# Patient Record
Sex: FEMALE | Race: WHITE | NOT HISPANIC OR LATINO | Employment: UNEMPLOYED | URBAN - METROPOLITAN AREA
[De-identification: names, ages, dates, MRNs, and addresses within clinical notes are randomized per-mention and may not be internally consistent; named-entity substitution may affect disease eponyms.]

---

## 2022-04-04 NOTE — PROGRESS NOTES
Initial Refugee Screening Exam    PC staff should enter immunizations into the chart     used this visit: A Turkmen  was used for this visit.     HEALTH HISTORY    Concerns today:     L flank pain- has been going on for 3-4 years intermittently; was told that she has a kidney stone on L side that was 4 mm and stones on the R as well. Has been urinating okay. No recent hematuria.   1 year ago last imaged. Last pain happened 2 days ago.  Pains will happen a couple times a week.  She has been using Tylenol and ibuprofen for them.    Eye trouble- has been having itching in her eyes.  She also has blurry vision; she has glasses and [place] that she is not able to bring with her needs to see an eye doctor.  Has not noticed any redness in her eyes and does not have any pain.  No history of allergies.     History of blood infection- happened a long time ago, had a miscarriage because of it. Went to the doctor and was told had infection.  Has not been having any fevers.  She is not sure what kind of blood infection it was, only got diagnosed that after she went for testing after the miscarriage.    Last period 3 months ago, occasionally nauseous. Hasn't tested for pregnancy.  She occasionally feels nauseous.    Country of Origin:  [facility]  Year left country of Origin: September 2021  Other countries lived in and dates: none  Date of Arrival in [place]: September  Is our listed age correct? Yes  Native Language: Turkmen  Family in [place]: Yes - living with brother      Pre-Departure Medical Screening Examination Reviewed:Yes - itchy eyes, otherwise healthy  Class A conditions: No  Class B conditions: No  Presumptive treatment for intestinal parasites?: No  History of BCG vaccination: Unknown  Chronic or serious illness: No  Hospitalizations: No  Trauma: No    .      Family history, medication list, problem list and allergies were reviewed and updated as needed in Epic.    ROS:    No difficulties breathing.  "No nasal congestion, ear pain,       EXAMINATION:  BP 93/59   Pulse 83   Temp 98.3  F (36.8  C) (Oral)   Resp 20   Ht 1.567 m (5' 1.7\")   Wt 48.5 kg (107 lb)   SpO2 99%   BMI 19.76 kg/m    GENERAL: healthy, alert, well nourished, well hydrated, no distress  HENT: Pupils normal.  Nose- normal; Mouth- no ulcers, no lesions.  No conjunctival injection, extraocular movements normal.  NECK: no tenderness, no adenopathy, no asymmetry, no masses, no stiffness; thyroid- normal to palpation  RESP: lungs clear to auscultation - no rales, no rhonchi, no wheezes  CV: regular rates and rhythm, normal S1 S2, no S3 or S4 and no murmur, no click or rub -  ABDOMEN: soft, no tenderness, no  hepatosplenomegaly, no masses, normal bowel sounds    ASSESSMENT:    New Arrival Health Screening     PLAN:    1. Refugee health examination  Will discuss lab results at follow-up visit in 2 weeks  - TB Quantiferon Gold Plus  - Comprehensive Metabolic  - CBC with Platelets & Differential  - Syphilis Screen Cascade  - Strongyloides Ab,IgG (STRNG)  - Schistosoma Yadira  - HIV Ag/Ab Screen Cascade  - Hepatitis A Immune Status  - Hepatitis B Core Ab  - Hepatitis B Surface Ab  - Hepatitis C Antibody  - Hepatitis B Surface Ag  - Neisseria gonorrhoeae PCR  - Chlamydia trachomatis PCR  - Lipid Cascade    2. Nephrolithiasis  Left flank pain  No kidney stones found in Afanian and continued  intermittent flank pain.  Is currently pregnant so would like to avoid CT if possible.  No signs of infection on UA; UC pending.  Not having any dysuria or fevers.  Like to evaluate for stones with ultrasound comfortably same time as when she is having her dating ultrasound  - US Abdomen Complete; Future  - UA reflex to Microscopic  - Urine Microscopic Exam  -Urine culture pending    3. Allergic conjunctivitis, bilateral  No conjunctival changes.  Patient has a reported history of poor vision and would like to get into see eye doctor soon as possible  - " olopatadine (PATANOL) 0.1 % ophthalmic solution; Place 1 drop into both eyes 2 times daily  Dispense: 5 mL; Refill: 1    4. Missed period  regnancy test positive  Incidentally found today; unplanned pregnancy.  About 3 months based off LMP; dating ultrasound ordered.  Have been doing ibuprofen for flank pain; discussed discontinuing this.  - HCG qualitative urine  - US OB <14 WKS SINGLE OR FIRST GESTATION; Future  - US Abdomen Complete; Future  - Prenatal Vit-Fe Fumarate-FA (PRENATAL MULTIVITAMIN W/IRON) 27-0.8 MG tablet; Take 1 tablet by mouth daily  Dispense: 90 tablet; Refill: 3      Diagnosis or treatment significantly limited by social determinants of health - Refugee status, no transportation, very few social supports, language barrier        RTC in 2-4 weeks for discussion of results, treatment (if necessary) and  devlopment of an ongoing plan for care    Felecia Bazzi MD    Patient was seen and discussed with Dr. Butts who agrees with assessment and plan.

## 2022-04-05 ENCOUNTER — OFFICE VISIT (OUTPATIENT)
Dept: FAMILY MEDICINE | Facility: CLINIC | Age: 27
End: 2022-04-05
Payer: MEDICAID

## 2022-04-05 ENCOUNTER — TELEPHONE (OUTPATIENT)
Dept: FAMILY MEDICINE | Facility: CLINIC | Age: 27
End: 2022-04-05

## 2022-04-05 VITALS
OXYGEN SATURATION: 99 % | SYSTOLIC BLOOD PRESSURE: 93 MMHG | DIASTOLIC BLOOD PRESSURE: 59 MMHG | WEIGHT: 107 LBS | HEIGHT: 62 IN | RESPIRATION RATE: 20 BRPM | HEART RATE: 83 BPM | TEMPERATURE: 98.3 F | BODY MASS INDEX: 19.69 KG/M2

## 2022-04-05 DIAGNOSIS — N20.0 NEPHROLITHIASIS: ICD-10-CM

## 2022-04-05 DIAGNOSIS — Z32.01 PREGNANCY TEST POSITIVE: Primary | ICD-10-CM

## 2022-04-05 DIAGNOSIS — R10.9 LEFT FLANK PAIN: ICD-10-CM

## 2022-04-05 DIAGNOSIS — Z02.89 REFUGEE HEALTH EXAMINATION: ICD-10-CM

## 2022-04-05 DIAGNOSIS — N92.6 MISSED PERIOD: ICD-10-CM

## 2022-04-05 DIAGNOSIS — H10.13 ALLERGIC CONJUNCTIVITIS, BILATERAL: ICD-10-CM

## 2022-04-05 LAB
ALBUMIN SERPL-MCNC: 3.4 G/DL (ref 3.5–5)
ALBUMIN UR-MCNC: NEGATIVE MG/DL
ALP SERPL-CCNC: 54 U/L (ref 45–120)
ALT SERPL W P-5'-P-CCNC: <9 U/L (ref 0–45)
ANION GAP SERPL CALCULATED.3IONS-SCNC: 9 MMOL/L (ref 5–18)
APPEARANCE UR: CLEAR
AST SERPL W P-5'-P-CCNC: 11 U/L (ref 0–40)
BACTERIA #/AREA URNS HPF: ABNORMAL /HPF
BASOPHILS # BLD AUTO: 0 10E3/UL (ref 0–0.2)
BASOPHILS NFR BLD AUTO: 1 %
BILIRUB SERPL-MCNC: 0.4 MG/DL (ref 0–1)
BILIRUB UR QL STRIP: NEGATIVE
BUN SERPL-MCNC: 9 MG/DL (ref 8–22)
CALCIUM SERPL-MCNC: 8.8 MG/DL (ref 8.5–10.5)
CHLORIDE BLD-SCNC: 107 MMOL/L (ref 98–107)
CHOLEST SERPL-MCNC: 151 MG/DL
CO2 SERPL-SCNC: 19 MMOL/L (ref 22–31)
COLOR UR AUTO: YELLOW
CREAT SERPL-MCNC: 0.55 MG/DL (ref 0.6–1.1)
EOSINOPHIL # BLD AUTO: 0.1 10E3/UL (ref 0–0.7)
EOSINOPHIL NFR BLD AUTO: 2 %
ERYTHROCYTE [DISTWIDTH] IN BLOOD BY AUTOMATED COUNT: 12.8 % (ref 10–15)
FASTING STATUS PATIENT QL REPORTED: NORMAL
GFR SERPL CREATININE-BSD FRML MDRD: >90 ML/MIN/1.73M2
GLUCOSE BLD-MCNC: 79 MG/DL (ref 70–125)
GLUCOSE UR STRIP-MCNC: NEGATIVE MG/DL
HCG UR QL: POSITIVE
HCT VFR BLD AUTO: 30.1 % (ref 35–47)
HDLC SERPL-MCNC: 57 MG/DL
HGB BLD-MCNC: 9.7 G/DL (ref 11.7–15.7)
HGB UR QL STRIP: NEGATIVE
HIV 1+2 AB+HIV1 P24 AG SERPL QL IA: NEGATIVE
IMM GRANULOCYTES # BLD: 0 10E3/UL
IMM GRANULOCYTES NFR BLD: 0 %
KETONES UR STRIP-MCNC: NEGATIVE MG/DL
LDLC SERPL CALC-MCNC: 82 MG/DL
LEUKOCYTE ESTERASE UR QL STRIP: ABNORMAL
LYMPHOCYTES # BLD AUTO: 1.6 10E3/UL (ref 0.8–5.3)
LYMPHOCYTES NFR BLD AUTO: 26 %
MCH RBC QN AUTO: 29.9 PG (ref 26.5–33)
MCHC RBC AUTO-ENTMCNC: 32.2 G/DL (ref 31.5–36.5)
MCV RBC AUTO: 93 FL (ref 78–100)
MONOCYTES # BLD AUTO: 0.4 10E3/UL (ref 0–1.3)
MONOCYTES NFR BLD AUTO: 7 %
NEUTROPHILS # BLD AUTO: 3.9 10E3/UL (ref 1.6–8.3)
NEUTROPHILS NFR BLD AUTO: 64 %
NITRATE UR QL: NEGATIVE
NRBC # BLD AUTO: 0 10E3/UL
NRBC BLD AUTO-RTO: 0 /100
PH UR STRIP: 6.5 [PH] (ref 5–8)
PLATELET # BLD AUTO: 269 10E3/UL (ref 150–450)
POTASSIUM BLD-SCNC: 4 MMOL/L (ref 3.5–5)
PROT SERPL-MCNC: 6.3 G/DL (ref 6–8)
RBC # BLD AUTO: 3.24 10E6/UL (ref 3.8–5.2)
RBC #/AREA URNS AUTO: ABNORMAL /HPF
SODIUM SERPL-SCNC: 135 MMOL/L (ref 136–145)
SP GR UR STRIP: 1.02 (ref 1–1.03)
SQUAMOUS #/AREA URNS AUTO: ABNORMAL /LPF
TRIGL SERPL-MCNC: 59 MG/DL
UROBILINOGEN UR STRIP-ACNC: 0.2 E.U./DL
WBC # BLD AUTO: 6.1 10E3/UL (ref 4–11)
WBC #/AREA URNS AUTO: ABNORMAL /HPF

## 2022-04-05 PROCEDURE — 87086 URINE CULTURE/COLONY COUNT: CPT | Performed by: FAMILY MEDICINE

## 2022-04-05 PROCEDURE — 86803 HEPATITIS C AB TEST: CPT | Performed by: FAMILY MEDICINE

## 2022-04-05 PROCEDURE — 86706 HEP B SURFACE ANTIBODY: CPT | Performed by: FAMILY MEDICINE

## 2022-04-05 PROCEDURE — 87340 HEPATITIS B SURFACE AG IA: CPT | Performed by: FAMILY MEDICINE

## 2022-04-05 PROCEDURE — 80061 LIPID PANEL: CPT | Performed by: FAMILY MEDICINE

## 2022-04-05 PROCEDURE — 80053 COMPREHEN METABOLIC PANEL: CPT | Performed by: FAMILY MEDICINE

## 2022-04-05 PROCEDURE — 87389 HIV-1 AG W/HIV-1&-2 AB AG IA: CPT | Performed by: FAMILY MEDICINE

## 2022-04-05 PROCEDURE — 86708 HEPATITIS A ANTIBODY: CPT | Performed by: FAMILY MEDICINE

## 2022-04-05 PROCEDURE — 87491 CHLMYD TRACH DNA AMP PROBE: CPT | Performed by: FAMILY MEDICINE

## 2022-04-05 PROCEDURE — 81025 URINE PREGNANCY TEST: CPT | Performed by: FAMILY MEDICINE

## 2022-04-05 PROCEDURE — 86780 TREPONEMA PALLIDUM: CPT | Performed by: FAMILY MEDICINE

## 2022-04-05 PROCEDURE — 36415 COLL VENOUS BLD VENIPUNCTURE: CPT | Performed by: FAMILY MEDICINE

## 2022-04-05 PROCEDURE — 99000 SPECIMEN HANDLING OFFICE-LAB: CPT | Performed by: FAMILY MEDICINE

## 2022-04-05 PROCEDURE — 85025 COMPLETE CBC W/AUTO DIFF WBC: CPT | Performed by: FAMILY MEDICINE

## 2022-04-05 PROCEDURE — 87591 N.GONORRHOEAE DNA AMP PROB: CPT | Performed by: FAMILY MEDICINE

## 2022-04-05 PROCEDURE — 99204 OFFICE O/P NEW MOD 45 MIN: CPT | Mod: GC | Performed by: FAMILY MEDICINE

## 2022-04-05 PROCEDURE — 86704 HEP B CORE ANTIBODY TOTAL: CPT | Performed by: FAMILY MEDICINE

## 2022-04-05 PROCEDURE — 86481 TB AG RESPONSE T-CELL SUSP: CPT | Performed by: FAMILY MEDICINE

## 2022-04-05 PROCEDURE — 81001 URINALYSIS AUTO W/SCOPE: CPT | Performed by: FAMILY MEDICINE

## 2022-04-05 PROCEDURE — 86682 HELMINTH ANTIBODY: CPT | Mod: 90 | Performed by: FAMILY MEDICINE

## 2022-04-05 RX ORDER — OLOPATADINE HYDROCHLORIDE 1 MG/ML
1 SOLUTION/ DROPS OPHTHALMIC 2 TIMES DAILY
Qty: 5 ML | Refills: 1 | Status: SHIPPED | OUTPATIENT
Start: 2022-04-05 | End: 2022-08-05

## 2022-04-05 RX ORDER — PRENATAL VIT/IRON FUM/FOLIC AC 27MG-0.8MG
1 TABLET ORAL DAILY
Qty: 90 TABLET | Refills: 3 | Status: SHIPPED | OUTPATIENT
Start: 2022-04-05 | End: 2022-06-21

## 2022-04-05 NOTE — TELEPHONE ENCOUNTER
Spoke with  Violeta at [facility] ([phone]), she will assist family in getting to pharmacy for medication.    US abd limited  4/12/22 2:40 pm  [place] [id number] [facility] [address]  NPO 8 hours    US OB  4/18/22 1:40 pm  [address] [place] [address]  Prep: Full bladder    4/21/22 1:00 pm  [facility]  [address] [place] [address]    Communicated above information to patient's  with [email or web address] ([place]) . Transportation request sent to [facility] transportation. Update sent to Violeta  at [facility].     ./JERMAIN

## 2022-04-05 NOTE — PROGRESS NOTES
Preceptor Attestation:    I discussed the patient with the resident and evaluated the patient in person. I have verified the content of the note, which accurately reflects my assessment of the patient and the plan of care.   Supervising Physician:  All Butts MD.

## 2022-04-06 ENCOUNTER — TELEPHONE (OUTPATIENT)
Dept: FAMILY MEDICINE | Facility: CLINIC | Age: 27
End: 2022-04-06
Payer: COMMERCIAL

## 2022-04-06 LAB
C TRACH DNA SPEC QL NAA+PROBE: NEGATIVE
HAV IGG SER QL IA: POSITIVE
HBV CORE AB SERPL QL IA: NEGATIVE
HBV SURFACE AB SERPLBLD QL IA.RAPID: POSITIVE
HBV SURFACE AG SERPL QL IA: NONREACTIVE
HCV AB SERPL QL IA: NEGATIVE
N GONORRHOEA DNA SPEC QL NAA+PROBE: NEGATIVE
T PALLIDUM AB SER QL: NONREACTIVE

## 2022-04-06 NOTE — TELEPHONE ENCOUNTER
04/05/2022: Care Coordination      Dr. Dodd asked that I step in to assist Mr. & Mrs. Justice along with their 11 month old Son. They are a refugee family that where in need of household supply's, pots, pans, and dishes Mr. Justice had shoes on that did not fit his feet as he had on shoes that he had folded down the back of the shoes so they would fit like a pair of slippers. His wife and son did not have coats and nor did he.     I searched our inventory was able to locate pots, pans, a crock pot, bowls, glasses, a coat for his wife, and socks for the son. I told them about [employer] [facility] and told them that I would make an appointment for their family to go shopping there.     I then went back to my desk to get ready to leave when one of the CMA's came to me and asked that I call [facility] in [employer] on [address] to check on the medications a well as  set up transportation from here to [employer] then home.     I checked on the medications and each one had 2 prescriptions, all where ready for  without any co pay's except for one of Mr. Justice's  That will need to be picked up today.     I then set up transportation for the family from here to [employer], then home. I debriefed with with Nurse Hopper this morning who updated Nurse Lewis.         Darrion Berkowitz Sr.  Care Coordinator  [address] [address]  [email or web address]   Office: [phone]  Direct: [phone]  [facility]

## 2022-04-07 LAB
BACTERIA UR CULT: NORMAL
GAMMA INTERFERON BACKGROUND BLD IA-ACNC: 0.08 IU/ML
M TB IFN-G BLD-IMP: NEGATIVE
M TB IFN-G CD4+ BCKGRND COR BLD-ACNC: 9.92 IU/ML
MITOGEN IGNF BCKGRD COR BLD-ACNC: -0.01 IU/ML
MITOGEN IGNF BCKGRD COR BLD-ACNC: -0.02 IU/ML
QUANTIFERON MITOGEN: 10 IU/ML
QUANTIFERON NIL TUBE: 0.08 IU/ML
QUANTIFERON TB1 TUBE: 0.07 IU/ML
QUANTIFERON TB2 TUBE: 0.06
STRONGYLOIDES IGG SER IA-ACNC: 0.2 IV

## 2022-04-10 LAB — SCHISTOSOMA IGG SER IA-ACNC: 4 U

## 2022-04-11 NOTE — TELEPHONE ENCOUNTER
Transportation request for below appointments emailed to [facility] 4/6/22.     Addendum 4/11/22:  US abd limited  4/12/22 2:40 pm  [facility]  [address] [address]  NPO 8 hours    Transportation set up with [employer], [phone]. Spoke with patient's  with [email or web address]  and reminded him of appt. ./LR    Addendum 4/15/22:    US OB  4/18/22 1:40 pm  [address] [place] [address]  Prep: Full bladder     4/21/22 1:00 pm  [facility]  [address] [place] [address]    Transportation for above appointments set with [employer], will call return. ./LR

## 2022-04-12 ENCOUNTER — HOSPITAL ENCOUNTER (OUTPATIENT)
Dept: ULTRASOUND IMAGING | Facility: CLINIC | Age: 27
Discharge: HOME | End: 2022-04-12
Admitting: FAMILY MEDICINE
Payer: MEDICAID

## 2022-04-12 DIAGNOSIS — N20.0 NEPHROLITHIASIS: ICD-10-CM

## 2022-04-12 DIAGNOSIS — R10.9 LEFT FLANK PAIN: ICD-10-CM

## 2022-04-12 PROCEDURE — 76770 US EXAM ABDO BACK WALL COMP: CPT

## 2022-04-15 ENCOUNTER — OFFICE VISIT (OUTPATIENT)
Dept: FAMILY MEDICINE | Facility: CLINIC | Age: 27
End: 2022-04-15
Payer: MEDICAID

## 2022-04-15 ENCOUNTER — ALLIED HEALTH/NURSE VISIT (OUTPATIENT)
Dept: FAMILY MEDICINE | Facility: CLINIC | Age: 27
End: 2022-04-15

## 2022-04-15 VITALS
WEIGHT: 108.4 LBS | RESPIRATION RATE: 16 BRPM | BODY MASS INDEX: 21.28 KG/M2 | OXYGEN SATURATION: 98 % | SYSTOLIC BLOOD PRESSURE: 102 MMHG | HEIGHT: 60 IN | TEMPERATURE: 98.4 F | HEART RATE: 83 BPM | DIASTOLIC BLOOD PRESSURE: 67 MMHG

## 2022-04-15 DIAGNOSIS — O09.90 SUPERVISION OF HIGH RISK PREGNANCY, ANTEPARTUM: ICD-10-CM

## 2022-04-15 DIAGNOSIS — Z60.3 LANGUAGE BARRIER, CULTURAL DIFFERENCES: ICD-10-CM

## 2022-04-15 DIAGNOSIS — Z34.81 ENCOUNTER FOR SUPERVISION OF OTHER NORMAL PREGNANCY IN FIRST TRIMESTER: Primary | ICD-10-CM

## 2022-04-15 DIAGNOSIS — N20.0 NEPHROLITHIASIS: ICD-10-CM

## 2022-04-15 DIAGNOSIS — O09.299 H/O POSTPARTUM HEMORRHAGE, CURRENTLY PREGNANT: ICD-10-CM

## 2022-04-15 DIAGNOSIS — Z87.59 HISTORY OF STILLBIRTH: ICD-10-CM

## 2022-04-15 DIAGNOSIS — O26.20 HIGH RISK PREGNANCY DUE TO RECURRENT MISCARRIAGE: ICD-10-CM

## 2022-04-15 DIAGNOSIS — N13.30 HYDROURETERONEPHROSIS: ICD-10-CM

## 2022-04-15 DIAGNOSIS — Z02.89 ENCOUNTER FOR HEALTH EXAMINATION OF REFUGEE: ICD-10-CM

## 2022-04-15 DIAGNOSIS — N20.0 KIDNEY STONE: ICD-10-CM

## 2022-04-15 LAB
ABO/RH(D): NORMAL
ANTIBODY SCREEN: NEGATIVE
SPECIMEN EXPIRATION DATE: NORMAL
SPECIMEN EXPIRATION DATE: NORMAL

## 2022-04-15 PROCEDURE — 86850 RBC ANTIBODY SCREEN: CPT | Performed by: FAMILY MEDICINE

## 2022-04-15 PROCEDURE — 86901 BLOOD TYPING SEROLOGIC RH(D): CPT | Performed by: FAMILY MEDICINE

## 2022-04-15 PROCEDURE — H1001 ANTEPARTUM MANAGEMENT: HCPCS

## 2022-04-15 PROCEDURE — 36415 COLL VENOUS BLD VENIPUNCTURE: CPT | Performed by: FAMILY MEDICINE

## 2022-04-15 PROCEDURE — 87086 URINE CULTURE/COLONY COUNT: CPT | Performed by: FAMILY MEDICINE

## 2022-04-15 PROCEDURE — 99207 PR PRENATAL VISIT: CPT | Mod: GC | Performed by: FAMILY MEDICINE

## 2022-04-15 PROCEDURE — 86762 RUBELLA ANTIBODY: CPT | Performed by: FAMILY MEDICINE

## 2022-04-15 PROCEDURE — 83655 ASSAY OF LEAD: CPT | Mod: 90 | Performed by: FAMILY MEDICINE

## 2022-04-15 PROCEDURE — 99000 SPECIMEN HANDLING OFFICE-LAB: CPT | Performed by: FAMILY MEDICINE

## 2022-04-15 PROCEDURE — H1002 CARECOORDINATION PRENATAL: HCPCS

## 2022-04-15 PROCEDURE — 99207 PR NO BILLABLE SERVICE THIS VISIT: CPT

## 2022-04-15 PROCEDURE — 86900 BLOOD TYPING SEROLOGIC ABO: CPT | Performed by: FAMILY MEDICINE

## 2022-04-15 PROCEDURE — 86780 TREPONEMA PALLIDUM: CPT | Performed by: FAMILY MEDICINE

## 2022-04-15 PROCEDURE — 86787 VARICELLA-ZOSTER ANTIBODY: CPT | Performed by: FAMILY MEDICINE

## 2022-04-15 SDOH — SOCIAL STABILITY - SOCIAL INSECURITY: ACCULTURATION DIFFICULTY: Z60.3

## 2022-04-15 NOTE — PROGRESS NOTES
"  First Obstetric Visit           Assessment and Plan     Chrystal was seen today for prenatal care.    Diagnoses and all orders for this visit:    Encounter for supervision of other normal pregnancy in first trimester  Unsure LMP, thinks around January. Likely about 13 weeks, hasn't had dating US yet.   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 Wondering if was pyelonephritis sepsis based off history; no records available and unfortunately patient was not given/doesn't remember the details.   -     [facility] Referral - Pregnancy; Future    Nephrolithiasis  L flank pain; US showed stones on R side (not in ureter), and likely physiologic hydroureternephrosis. L flank pain is likely musculoskeletal given negative renal ultrasound and lack of hematuria; recommended continue to monitor    26 year old   First pregnancy miscarriage at 6 mo, then 2 months miscarriage   Third pregnancy born at term and  at 1 mo of life  Fourth pregnancy baby is alive and well, 11 months old.     Belkys needed blood after due to postpartum hermorrhage, didn't need procedures  No diabetes or HTN known during pregnancies  6 mo miscarriage was due to \"infection in blood\", ?from pyelonephritis sepsis.      , Unknown weeks of pregnancy with KVNG of Oct 27, 2022 by LMP of Patient's last menstrual period was 2022 (approximate)..      Pregnancy Risk Assessment: High Risk pregnancy  Discussed high risk conditions as follows:  History second trimester intrauterine fetus demise  History " of postpartum hemorrhage needing blood transfusion  History nephrolithiasis with documented right stones present on ultrasound this pregnancy    -Dating US obtained and dating confirmed?   No-patient needs to schedule  -Taking PNV/Folate?     Yes       - Ordered new OB labs: Some of OB labs had already been performed at initial refugee visit 2 weeks ago; remainder were done today.    -Discussed risks and benefits of first trimester screen for trisomies, patient accepted. [facility] referral placed.  - Discussed genetic screening. Patient does want to pursue screening.   - Discussed screening for sickle cell anemia. Patient does not  want to pursue Hb electrophoresis.     - Discussed early screening for gestational diabetes. The patient does not have a history of GDM, BMI>30, h/o prediabetes/glucose intolerance, first degree relative with GDM or DM, or chronic HTN, so WILL NOT obtain early GCT or A1c.    - The patient does not h/o severe, early preeclampsia with delivery <34weeks, preeclampsia in more than one pregnancy, pre-gestational diabetes, chronic hypertension, renal disease, or autoimmune disease so WILL NOT start low dose aspirin (81mg) and calcium supplementation (1g-1.5g/day elemental = 2500mg- 3750mg/day Calcium carbonate) to prevent preeclampsia.    - The patient  does not have a history of spontaneous  birth so  WILL NOT consider progesterone starting at 16-20 weeks and/or serial transvaginal cervical length ultrasounds from 16-24 weeks.    - Prenatal vitamins ordered.      Counseling given:   - Follow up in 4 weeks for return OB visit.  - Recommended weight gain for pregnancy: 25-35 lbs (pregravid BMI 18.5-24.9)      - Instructed on best evidence for: healthy diet and foods to avoid; exercise and activity during pregnancy; avoiding exposure to toxoplasmosis; safe use of seatbelts during pregnancy; and maintenance of a generally healthy lifestyle  -Patient to see OB educator/ RN today and/or next  visit.    Discussed the harms, benefits, side effects and alternative therapies for current prescribed and OTC medications.      There are no Patient Instructions on file for this visit.    Options for treatment and follow-up care were reviewed with the patient and/or guardian. Chrystal Justice and/or guardian engaged in the decision making process and verbalized understanding of the options discussed and agreed with the final plan.    Diagnosis or treatment significantly limited by social determinants of health - Refugee status, language barrier    Felecia Bazzi MD    Patient was seen and discussed with Dr. Valencia who agrees with assessment and plan.            ADRIENNE Justice is a 26 year old woman who presents for an initial prenatal visit at Unknown weeks of pregnancy with KVNG of Oct 27, 2022 by LMP of Patient's last menstrual period was 01/20/2022 (approximate)..      She has not had bleeding since her LMP.   She has had mild nausea.   Weight loss has not occurred.    This was not a planned pregnancy.     OTHER CONCERNS: L flank pain intermittently.  Worried about possibility of getting another blood infection               Past Medical History     Past Medical History:   Diagnosis Date     Kidney stone      See nurse history note, reviewed in detail.             Current Medications      Current Outpatient Medications   Medication Sig Dispense Refill     olopatadine (PATANOL) 0.1 % ophthalmic solution Place 1 drop into both eyes 2 times daily 5 mL 1     Prenatal Vit-Fe Fumarate-FA (PRENATAL MULTIVITAMIN W/IRON) 27-0.8 MG tablet Take 1 tablet by mouth daily 90 tablet 3                  Physical Exam      /67   Pulse 83   Temp 98.4  F (36.9  C)   Resp 16   Ht 1.524 m (5')   Wt 49.2 kg (108 lb 6.4 oz)   LMP 01/20/2022 (Approximate)   SpO2 98%   BMI 21.17 kg/m    GENERAL: healthy, alert and no distress  ABDOMEN: soft, no tenderness, no  hepatosplenomegaly, no masses, normal  bowel sounds  No scars noted. Fundus about 3 cm below umbilicus. FHT 130s      Last pap none on record.  She is due for this today.    =========================================

## 2022-04-15 NOTE — NURSING NOTE
Due to patient being non-English speaking/uses sign language, an  was used for this visit. Only for face-to-face interpretation by an external agency, date and length of interpretation can be found on the scanned worksheet.     name: [id number]  Agency: [employer]  Language: Mojgan   Telephone number: [phone]  Type of interpretation: Telephone, spoken

## 2022-04-15 NOTE — PROGRESS NOTES
Past Medical History     Do you have a history of any of the following medical conditions?    Condition No/Yes/Details   Hypertension No    Heart disease, mitral valve prolapse, rheumatic fever No    Asthma or another chronic lung disease No    An autoimmune disorder No    Kidney disease  YES Pt has kidney stones.   Frequent urinary tract infections No    Epilepsy, seizures, or spells No    Migraine headaches No    Stroke, loss of sensation/function, seizures, or other neuro problem No    Diabetes No    Thyroid problems or have you taken thyroid medication  YES as a child was given medicine   Hepatitis, liver disease, jaundice No    Blood clots, phlebitis, pulmonary embolism or varicose veins No    Excessive bleeding after surgery or dental work No    Do you have more bleeding than other women after a cut or a scratch? No    Anemia No    Blood transfusions  YES following delivery of her son        Would you refuse a blood transfusion?       No   If yes, then ask next question. If no, skip next question.   Would you rather die than receive a blood transfusion? No    Breast problems No    Have you ever ?  YES plans to breastfeed   Abnormalities of the uterus No    Abnormal pap smear No    Have you ever been treated for depression? No    Are you having problems with crying spells or loss of self-esteem? No    Have you ever required psychiatric care? No    Have you been physically, sexually, or emotionally hurt by someone? No    Have you been in a major accident or suffered serious trauma? No    Have you ever had any gynecological surgical procedures such as cervical conization, LEEP, laser treatment, cryosurgery of the cervix, or a dilatation and curettage? No    Have you had any other surgical procedures? No         Have you ever had any complications from anesthesia? No    Have you ever been hospitalized for a nonsurgical reason? No             Substance use and exposure     Does anyone in your home  smoke? No    Do you use tobacco products or betel nut? No    Do you drink beer, wine, or hard liquor? No    Do you use any of the following: marijuana, speed, cocaine, heroin, hallucinogens, or other drugs? No               Symptoms since Last Menstrual Period     Do you currently have any of the following symptoms: No/Yes/Details        Abdominal pain  YES flank pain has rt side, mild abd pain        Blood in the stools or urine No         Chest pain No         Shortness of breath No         coughing or vomiting up blood No         Your heart racing or skipping beats No         Nausea or vomiting No -resolved        Pain on urination  YES resolved        Vaginal discharge or bleeding No                Genetic Screening          Is the patient 35 years or older? No      Do you have a history of any of the following No/Yes/Details        A metabolic disorder (e.g. Insulin-dependent DM, PKU) No         Recurrent pregnancy loss or still birth No      Do you, the baby's father, or anyone in your families have          Thalassemia AND MCV <80 No         Hemophilia No         Neural tube defect No }        Congenital heart defect No         Sickle cell disease or trait No         Muscular dystrophy No         Cystic fibrosis No         Mental retardation or autism No         Down's syndrome No         Rajeev-Sach's disease No         Terrence's chorea No         Any other inherited genetic or chromosomal disorder No         A child with birth defects not listed above No                Infection History     Ever treated for tuberculosis or had a positive skin test No    Ever had genital herpes (or has your partner) No    Had a rash or viral illness since LMP No    Ever had a sexually transmitted infection No    Ever had chicken pox or the vaccine No    Have you had a sexual partner who is HIV positive No    Ever had any other serious infectious disease No                Risk Assessment     Average Risk Category  No  significant risk factors: Yes    At Risk Category (up to 3)  Teen pregnancy: No  Poor social situation: No  Domestic abuse: No  Financial difficulties: Yes  Smoker: No  H/O  deliver: Yes  H/O drug abuse: No  Non-English speaking: Yes  Advanced maternal age: No  GDM risks: No  Previous C/S: No  H/O PIH: No  H/O STIs: No  H/O mental health concerns: No  Onset care > 20 weeks: No      High Risk Category (4 or more At Risk or)  Diabetes/GDM: No  Multiple gestation: No  Chronic hypertension: No  Significant hx of asthma: No  Fetal demise > 20 weeks: No  Positive tox screen: No  Current mental health treatment: No      Risk: High Risk   Date determined: 4/15/22

## 2022-04-15 NOTE — PROGRESS NOTES
Preceptor Attestation:    I discussed the patient with the resident and evaluated the patient in person. I have verified the content of the note, which accurately reflects my assessment of the patient and the plan of care.   Supervising Physician:  Daily Valencia MD.

## 2022-04-16 LAB
RUBV IGG SERPL QL IA: 30.6 INDEX
RUBV IGG SERPL QL IA: POSITIVE
T PALLIDUM AB SER QL: NONREACTIVE
VZV IGG SER QL IA: 714 INDEX
VZV IGG SER QL IA: POSITIVE

## 2022-04-17 LAB
BACTERIA UR CULT: NORMAL
LEAD BLDV-MCNC: <2 UG/DL

## 2022-04-18 PROBLEM — O09.299 H/O POSTPARTUM HEMORRHAGE, CURRENTLY PREGNANT: Status: ACTIVE | Noted: 2022-04-15

## 2022-04-18 PROBLEM — N13.30 HYDROURETERONEPHROSIS: Status: ACTIVE | Noted: 2022-04-12

## 2022-04-18 PROBLEM — Z60.3 LANGUAGE BARRIER, CULTURAL DIFFERENCES: Status: ACTIVE | Noted: 2022-04-15

## 2022-04-18 PROBLEM — Z87.59 HISTORY OF STILLBIRTH: Status: ACTIVE | Noted: 2022-04-15

## 2022-04-18 PROBLEM — N20.0 KIDNEY STONE: Status: ACTIVE | Noted: 2022-04-12

## 2022-04-18 PROBLEM — Z02.89 ENCOUNTER FOR HEALTH EXAMINATION OF REFUGEE: Status: ACTIVE | Noted: 2022-04-15

## 2022-04-18 PROBLEM — O09.90 SUPERVISION OF HIGH RISK PREGNANCY, ANTEPARTUM: Status: ACTIVE | Noted: 2022-04-15

## 2022-04-18 NOTE — PROGRESS NOTES
Family Medicine OB Education    I provided the following OB education to Chrystal Justice.    Discussed that Dr Dodd or other [facility] provider should be the doctor that she sees for her prenatal visits and that provider will try hard to be the one to deliver her baby.  Discussed that if primary provider was unavailable that one of our other physicians would deliver the baby and that this could be a male or female provider.  Briefly discussed residency program and that multiple doctors would be present at time of delivery.  Sheet given and discussed fetal growth and development.  Sheet given and discussed warning signs with reasons to call clinic or L&D with questions or concerns (phone numbers given).  Sheet given and discussed Tdap to be given after 27 weeks gestation and the importance of family members get immunized before delivery.  Proof of pregnancy completed and given to pt.  Gave pt preregistration form for hospital to complete.  See questionnaire and pregnancy risk assessment for further information in provider encounter.     Legal Screener completed and there were no concerns for government benefits, housing, or immigration.      Name of provider who requested the OB education: Dr Yousif Bazzi  Name of provider on site (faculty or community preceptor) at the time of performing the OB education: Dr Erik Gallo, RN, BSN

## 2022-04-19 NOTE — RESULT ENCOUNTER NOTE
Please call patient with  to convey the following results:    There was no signs of infection in the urine or in the blood. Your blood type is AB positive.

## 2022-04-21 ENCOUNTER — TELEPHONE (OUTPATIENT)
Dept: FAMILY MEDICINE | Facility: CLINIC | Age: 27
End: 2022-04-21
Payer: COMMERCIAL

## 2022-04-21 NOTE — TELEPHONE ENCOUNTER
----- Message from Felecia Bazzi MD sent at 4/19/2022 10:52 AM CDT -----  Regarding: FW: dating US needed  MFMARTHA wants the dating before seeing her- looks like she didn't go to the [place] yesterday. Can you help her with rescheduling?   Thanks!    ----- Message -----  From: Diann Headley RN  Sent: 4/19/2022   8:39 AM CDT  To: Felecia Bazzi MD  Subject: dating US needed                                 Thank you for the referral on Ms. Justice. Could you please get her scheduled for a dating US asap so that we can verify viability and schedule her appropriately.  We will call to schedule her as soon as that is complete.    Thank you,  Diann Headley RN  Patient Care Coordinator  Maternal Fetal Medicine

## 2022-04-21 NOTE — TELEPHONE ENCOUNTER
4/20/22 Called and scheduled her dating u/s for Monday, 4/25/22 at 4:40 PM at [facility] (soonest available) and ride will  between 3:40-4:10 PM to bring to the appt and return ride set up as a will call.      Son's MRI is 5/13/22 at 1:00 PM at [facility].     Sent referral to [facility] and they should call her soon to set up home visit.     [facility] APPT: Friday, 5/20/22 at 3:30 PM with Dr Behm and ride will  between 2:30-3:00 PM and return ride set up as a will call.           4/21/22 8:20 AM Tried calling pt with [employer] (I) to let her know x's 2 and there was no answer and [facility] is full./KERLINE    4/21/22 10:55 AM Tried calling again and there was no answer./NG

## 2022-04-22 ENCOUNTER — TELEPHONE (OUTPATIENT)
Dept: FAMILY MEDICINE | Facility: CLINIC | Age: 27
End: 2022-04-22
Payer: COMMERCIAL

## 2022-04-22 NOTE — TELEPHONE ENCOUNTER
Attempted to reschedule patient's eye appointment to Friday after 2 pm per her request as that is when  is home from work. The soonest [facility] has an appointment this time is July 15th. Spoke with patient with [employer]  who stated in that case any day after 3 is ok. If these are not available, patient reluctantly agreed to do a morning appointment every day.     Scheduled for:  Tuesday May 3, 2022 at 8:30 am  [address] [place] [id number]    Messaged  Violeta to see if she could assist with transportation so  could get to work after appointment. ./LR    Addendum 4/25/22 2:45 pm  Bollinger back from Violeta  - she will get clients an uber to and from the appointment. ./LR

## 2022-04-25 NOTE — TELEPHONE ENCOUNTER
4/22/22 Called pt with [employer] [place]) and gave her info.  Pt asked for assistance with rescheduling eye doctor appt as she missed it and also her  needs help with dentist appt.      Discussed with Freda MCMULLEN and she will schedule eye doctor appt and dental appt for pt and her .  She will give them the appt info when they come to their appt on Tuesday, 4/26/22./KERLINE

## 2022-04-28 ENCOUNTER — TRANSCRIBE ORDERS (OUTPATIENT)
Dept: MATERNAL FETAL MEDICINE | Facility: CLINIC | Age: 27
End: 2022-04-28
Payer: COMMERCIAL

## 2022-04-28 DIAGNOSIS — O26.90 PREGNANCY RELATED CONDITION, ANTEPARTUM: Primary | ICD-10-CM

## 2022-04-29 ENCOUNTER — HOSPITAL ENCOUNTER (OUTPATIENT)
Facility: CLINIC | Age: 27
End: 2022-04-29
Payer: COMMERCIAL

## 2022-04-29 NOTE — PROGRESS NOTES
Maternal-Fetal Medicine Consultation    Chrystal Justice  : 1995  MRN: [id number]    REFERRAL:  Chrystal Justice is a 26 year old sent by Dr. Yousif Bazzi from [facility] for MFM consultation.    We spent 25 minutes with Chrystal and her partner today during our consultation, with > 50% of this time spent in face to face counseling and consultation for a history of IUFD. We utilized a Estonian iPad .     HPI:  Chrystal Justice is a 26 year old  at 19w3d by US today not c/w LMP here for MFM consultation regarding her history of intrauterine fetal demise.    Chrystal is a recent immigrant from [place].  She immig[place] the [place] in [place]. She is here with her  and her son. Her primary language is Estonian.     She has had a complicated obstetrical history. However, there is limited information available regarding these pregnancies and the patient was unable to provide much additional information at this time:  - 1st pregnancy in : 24w0d fetal demise.  Per report this was secondary to a blood infection in the setting of a kidney stone. She was told the stillbirth was related to a blood infection that she had.   - 2nd pregnancy in 2019: 8w0d SAB  - 3rd pregnancy in : 40w0d term delivery.   developed a fever on day 2 of life and demised at one month of age of unknown causes.  - 4th pregnancy in : 40w0d term delivery.  Complicated by postpartum hemorrhage requiring a blood transfusion. Her son was hospitalized for one month due to an infection as well as head swelling. He is doing well now without perceived medical issues.  Of note, he has an extra digit on his right hand abutting his thumb.    She is here today for a formal ultrasound as well as consultation regarding her current pregnancy.        Obstetrics History:  OB History    Para Term  AB Living   5 3 2 1 1 1   SAB IAB Ectopic Multiple Live Births   1 0 0 0 2  "     # Outcome Date GA Lbr Lg/2nd Weight Sex Delivery Anes PTL Lv   5 Current            4 Term 21 40w0d   M Vag-Spont None N HILDA      Birth Comments: postpartum hemorrhage, had to have blood transfusion, baby admitted in hospital for 1 month due to infection, head swelling      Name: Belkys Pepe Term  40w0d   M Vag-Spont  N DEC      Birth Comments: baby developed fever at 2 days of age and passed away at 1 month of age, unsure cause   2 SAB  8w0d    SAB      1   24w0d    STILLBIRTH None N FD      Birth Comments: passed away in utero, delivered vaginally in hospital and was told that she did have blood infection along with kidney stone       Past Medical History:  Past Medical History:   Diagnosis Date     Kidney stone        Past Surgical History:  Past Surgical History:   Procedure Laterality Date     NO HISTORY OF SURGERY         Current Medications:  Prior to Admission medications    Medication Sig Last Dose Taking? Auth Provider   olopatadine (PATANOL) 0.1 % ophthalmic solution Place 1 drop into both eyes 2 times daily   Felecia Hobson MD   Prenatal Vit-Fe Fumarate-FA (PRENATAL MULTIVITAMIN W/IRON) 27-0.8 MG tablet Take 1 tablet by mouth daily   Felecia Hobson MD       Allergies:  Patient has no known allergies.    Social History:   Denies use of alcohol, drugs or smoking.    Family History:  Non-contributory    ROS:  10-point ROS negative except as in HPI     PHYSICAL EXAM:  Deferred     Imaging:   Please see \"Imaging\" tab under \"Chart Review\" for details of today's US at the [facility].   Please note: anatomic survey is suboptimal due to fetal position  EGA today is changed based on all  US parameters consistent with KVNG of 22.     ASSESSMENT/PLAN:  Chrystal Justice is a 26 year old  at 19w3d by US today not c/w LMP here for [facility] consultation regarding her history of intrauterine fetal demise in the second trimester.     History " of fetal demise  - The most common causes of fetal death in developed countries are congenital or karyotypic anomalies, placental problems associated with growth restriction, and maternal medical diseases such as infection, diabetes mellitus, or hypertensive disorders. Many cases of fetal death, especially at term, are attributed to umbilical cord accidents. In approximately 25% of cases, however, the cause of fetal death cannot be explained.  The majority of women experiencing a fetal demise do not have any risk factors.   In Ms. Justice's case, her fetal demise appears to be secondary to infection/sepsis.  The risk of recurrence can therefore be mitigated by careful monitoring of the pregnancy for nephrolithiasis, urinary tract infection, and/or other infection.  In addition, access to care in this developed country will also improve pregnancy outcome.  - Antepartum fetal monitoring in the third trimester may help to identify fetuses at risk for death, however fetal deaths do occur in pregnancies receiving regular  testing.  Delivery timing should balance the risk between prematurity and the increasing risk of fetal death due to advancing gestational age.      Recommendations:  - Comprehensive anatomy ultrasound completed today.  - Serial ultrasounds every 4-6 weeks to assess growth may be used to assess placental function.  If growth is lagging or interval growth is suboptimal ultrasounds should be done more frequently  - Daily fetal movement counts after 28 weeks  gestation   - Frequent visits, documentation of fetal heart tones and fetal well-being and lots of positive reinforcement are invaluable.   - Urine culture every trimester with aggressive treatment of bacteriuria and/or blood in the urine.  If there is suspicion for infection or nephrolithiasis the patient should be actively managed and hospitalization not delayed if there is concern for sepsis.  If patient has recurrent UTIs, please do not  hesitate to put this patient on pharmaceutical prophylaxis.  - Consider induction at 39 weeks gestation.    The patient was seen and evaluated with Dr. Maradiaga    Thank you for allowing us to participate in the care of your patient. Please do not hesitate to contact us if you have further questions regarding the management of your patient.     Brooklynn Jj MD  Maternal Fetal Medicine Fellow  5/3/2022 2:48 PM      Physician Attestation   I, Chikis Maradiaga MD, saw this patient and agree with the findings and plan of care as documented in the note.      Items personally reviewed/procedural attestation: vitals, labs, imaging and agree with the interpretation documented in the note and I was present for and supervised the entire consultation and ultrasound procedure.    Chikis Maradiaga MD

## 2022-05-02 ENCOUNTER — PRE VISIT (OUTPATIENT)
Dept: MATERNAL FETAL MEDICINE | Facility: CLINIC | Age: 27
End: 2022-05-02
Payer: COMMERCIAL

## 2022-05-02 NOTE — TELEPHONE ENCOUNTER
Patient is scheduled for MFM tomorrow, conflicting with eye appointment. Will prioritize MFM appointment and reschedule eye appointment. Patient is aware.  is also aware and will assist with transportation. ./LR

## 2022-05-03 ENCOUNTER — HOSPITAL ENCOUNTER (OUTPATIENT)
Dept: ULTRASOUND IMAGING | Facility: CLINIC | Age: 27
Discharge: HOME | End: 2022-05-03
Attending: OBSTETRICS & GYNECOLOGY
Payer: COMMERCIAL

## 2022-05-03 ENCOUNTER — OFFICE VISIT (OUTPATIENT)
Dept: MATERNAL FETAL MEDICINE | Facility: CLINIC | Age: 27
End: 2022-05-03
Attending: OBSTETRICS & GYNECOLOGY
Payer: COMMERCIAL

## 2022-05-03 DIAGNOSIS — O26.90 PREGNANCY RELATED CONDITION, ANTEPARTUM: ICD-10-CM

## 2022-05-03 DIAGNOSIS — O09.292 SUPERVISION OF PREGNANCY WITH OTHER POOR REPRODUCTIVE OR OBSTETRIC HISTORY, SECOND TRIMESTER: Primary | ICD-10-CM

## 2022-05-03 PROCEDURE — 96040 HC GENETIC COUNSELING, EACH 30 MINUTES: CPT | Performed by: GENETIC COUNSELOR, MS

## 2022-05-03 PROCEDURE — 76813 OB US NUCHAL MEAS 1 GEST: CPT

## 2022-05-03 PROCEDURE — 99241 PR OFFICE CONSULTATION,LEVEL I: CPT | Mod: 25 | Performed by: OBSTETRICS & GYNECOLOGY

## 2022-05-03 PROCEDURE — 76811 OB US DETAILED SNGL FETUS: CPT

## 2022-05-03 NOTE — TELEPHONE ENCOUNTER
Eye appointment rescheduled:    5/17 8:30 am  [facility]  [address] [place] [address]    Transportation set up with [employer], will call return. Patient aware. ./LR

## 2022-05-03 NOTE — PROGRESS NOTES
Please see full imaging report from [employer] program under imaging tab.    Chikis Maradiaga MD  Maternal Fetal Medicine

## 2022-05-03 NOTE — PROGRESS NOTES
Pt presents to [facility] for assessment and evaluation of her pregnancy due to Hx IUFD @ 24w, kx kidney stones/hydroureteronephrosis, poor dating. Pt using ipad  today. Here with her  and child. Pt had us done and reviewed by Dr. Maradiaga. Pt met with Dr. Maradiaga and Dr. Jj. See note for today's discussion and recommendations. Pt will return in 4 weeks for Rl2. Questions answered. Discharged stable at this time. Daily Guevara RN

## 2022-05-03 NOTE — PROGRESS NOTES
"[facility]  Genetic Counseling Consult    Patient:  Chrystal Justice YOB: 1995   Date of Service:  22      Chrystal Justice was seen at the [facility] for genetic consultation as part of her appointment for comprehensive ultrasound.  The indication for genetic counseling is history of miscarriage and infant death. She was accompanied by her partner and their child.     The encounter was conducted with an ipad [employer]  due to the patient speaking limited english.        Impression/Plan:   1. Chrystal has not had serum screening in this pregnancy.     2. Chrystal had a comprehensive (level II) ultrasound today.  Please see the ultrasound report for further details.    3. The patient declines genetic amniocentesis and maternal serum screening today.     4. Chrystal has a follow-up ultrasound scheduled for 2022.     Pregnancy History:   /Parity:     Age at Delivery: 27 year old  KVNG: 10/27/2022, by Last Menstrual Period  Gestational Age: 19w3d    Chrystal mendoza pregnancy history is significant for the following in [facility]:  o 2016: Late miscarriage or stillbirth around 5-6 months, patient recalls having some kidney problems during pregnancy. She is not sure if she was born with kidney problems but does recall them previously to this pregnancy. During that pregnancy she may have had some \"blood problems\" and loss the pregnancy  o 2018: First trimester miscarriage  o : Term pregnancy, healthy son at birth. Patient recalls being very ill and weak during this pregnancy and having \"blood problems\" and being told to \"not eat milk or yogurt\". Her son  around one month of age. Patient is unsure of the cause of his death but does recall a fever  o : Term pregnancy, healthy son, doing well. During this pregnancy she recalls taking a medication for blood and not eating yogurt or milk and she felt " much better.      Medical History:   Chrystal mendoza reported medical history is not expected to impact pregnancy management or risks to fetal development.       Family History:   A three-generation pedigree was not obtained due to time constraints and focus on pregnancy history. Chrystal was asked if any of her relatives had problems with their kidneys and she was unsure.     Carrier Screening:   Expanded carrier screening for mutations in a large panel of genes associated with autosomal recessive conditions including cystic fibrosis, spinal muscular atrophy, and others, is now available.    Carrier screening was not discussed today. If she I and /or her partner are interested in further discussing the option of carrier screening, MFM would be available to assist in coordination if desired.        Risk Assessment for Chromosome Conditions:   We explained that the risk for fetal chromosome abnormalities increases with maternal age. We discussed specific features of common chromosome abnormalities, including Down syndrome, trisomy 13, trisomy 18, and sex chromosome trisomies.      At age 27 at midtrimester, the risk to have a baby with Down syndrome is 1 in 855.    At age 27 at midtrimester, the risk to have a baby with any chromosome abnormality is 1 in 428.       Chrystal did not have maternal serum screening earlier in pregnancy.    Testing Options:   Genetic testing was briefly discussed but not in detail. Testing was declined.     It was a pleasure to be involved with Chrystal mendoza care. Face-to-face time of the meeting was 30 minutes.      Kiley Duncan MS, [facility]  Licensed Genetic Counselor   [facility]  Maternal Fetal Medicine  [email or web address]  [email or web address]  Office: [phone]  Pager [phone]  [facility]: [phone]   Fax: [phone]

## 2022-05-04 NOTE — PATIENT INSTRUCTIONS
Maternal Fetal Medicine  [address] [address]    Phone: [phone]    APPT: Tuesday, 5/31/22 at 9:30 AM for ultrasound and consult.    RIDE: [employer] will pick you up between 8:30-9:00 AM to bring you to the appointment and return ride set up as a will call.

## 2022-05-31 ENCOUNTER — HOSPITAL ENCOUNTER (OUTPATIENT)
Dept: ULTRASOUND IMAGING | Facility: CLINIC | Age: 27
Discharge: HOME | End: 2022-05-31
Attending: OBSTETRICS & GYNECOLOGY
Payer: COMMERCIAL

## 2022-05-31 ENCOUNTER — OFFICE VISIT (OUTPATIENT)
Dept: MATERNAL FETAL MEDICINE | Facility: CLINIC | Age: 27
End: 2022-05-31
Attending: OBSTETRICS & GYNECOLOGY
Payer: COMMERCIAL

## 2022-05-31 DIAGNOSIS — O26.90 PREGNANCY RELATED CONDITION, ANTEPARTUM: ICD-10-CM

## 2022-05-31 DIAGNOSIS — Z36.89 ENCOUNTER FOR ULTRASOUND TO ASSESS FETAL GROWTH: ICD-10-CM

## 2022-05-31 DIAGNOSIS — O09.292 HISTORY OF STILLBIRTH IN CURRENTLY PREGNANT PATIENT, SECOND TRIMESTER: Primary | ICD-10-CM

## 2022-05-31 DIAGNOSIS — Z87.59 HISTORY OF STILLBIRTH: ICD-10-CM

## 2022-05-31 PROCEDURE — 76816 OB US FOLLOW-UP PER FETUS: CPT

## 2022-05-31 PROCEDURE — 76816 OB US FOLLOW-UP PER FETUS: CPT | Mod: 26 | Performed by: OBSTETRICS & GYNECOLOGY

## 2022-05-31 NOTE — PROGRESS NOTES
Please refer to ultrasound report under 'Imaging' Studies of 'Chart Review' tabs.    Young Valentine M.D.

## 2022-06-03 ENCOUNTER — OFFICE VISIT (OUTPATIENT)
Dept: FAMILY MEDICINE | Facility: CLINIC | Age: 27
End: 2022-06-03
Payer: COMMERCIAL

## 2022-06-03 VITALS
OXYGEN SATURATION: 98 % | DIASTOLIC BLOOD PRESSURE: 67 MMHG | RESPIRATION RATE: 18 BRPM | BODY MASS INDEX: 23.63 KG/M2 | WEIGHT: 121 LBS | TEMPERATURE: 98.6 F | SYSTOLIC BLOOD PRESSURE: 102 MMHG | HEART RATE: 94 BPM

## 2022-06-03 DIAGNOSIS — O09.90 SUPERVISION OF HIGH RISK PREGNANCY, ANTEPARTUM: Primary | ICD-10-CM

## 2022-06-03 DIAGNOSIS — L29.9 ITCHING: ICD-10-CM

## 2022-06-03 DIAGNOSIS — K21.9 GASTROESOPHAGEAL REFLUX DISEASE WITHOUT ESOPHAGITIS: ICD-10-CM

## 2022-06-03 PROCEDURE — 87086 URINE CULTURE/COLONY COUNT: CPT | Performed by: FAMILY MEDICINE

## 2022-06-03 PROCEDURE — 99207 PR PRENATAL VISIT: CPT | Mod: GC | Performed by: FAMILY MEDICINE

## 2022-06-03 RX ORDER — BENZOCAINE/MENTHOL 6 MG-10 MG
LOZENGE MUCOUS MEMBRANE 2 TIMES DAILY
Qty: 14.2 G | Refills: 1 | Status: SHIPPED | OUTPATIENT
Start: 2022-06-03 | End: 2022-06-21

## 2022-06-03 RX ORDER — FAMOTIDINE 40 MG/1
40 TABLET, FILM COATED ORAL 2 TIMES DAILY
Qty: 60 TABLET | Refills: 3 | Status: SHIPPED | OUTPATIENT
Start: 2022-06-03 | End: 2022-06-21

## 2022-06-03 RX ORDER — ACETAMINOPHEN 325 MG/1
325-650 TABLET ORAL EVERY 6 HOURS PRN
Qty: 180 TABLET | Refills: 1 | Status: SHIPPED | OUTPATIENT
Start: 2022-06-03 | End: 2022-06-21

## 2022-06-03 NOTE — NURSING NOTE
Due to patient being non-English speaking/uses sign language, an  was used for this visit. Only for face-to-face interpretation by an external agency, date and length of interpretation can be found on the scanned worksheet.     name: Rosenda  Agency: [employer]  Language: Ade    Telephone number: [phone]  Type of interpretation: Group, spoken; number of participants: 2           None

## 2022-06-03 NOTE — PROGRESS NOTES
Assessment & Plan  26 year old  at 23w6d with KVNG Sep 2022 based on LMP and   19 week US    Chrystal was seen today for prenatal care, abdominal pain, dizziness and medication reconciliation.    Diagnoses and all orders for this visit:    Supervision of high risk pregnancy, antepartum  Lower abdominal pain  Suspect round ligament pain, given pregnancy lumbrosacral band.   -     Urine Culture; Future  -     acetaminophen (TYLENOL) 325 MG tablet; Take 1-2 tablets (325-650 mg) by mouth every 6 hours as needed for mild pain  -     Miscellaneous Order for DME - ONLY FOR DME  -     Urine Culture    Gastroesophageal reflux disease without esophagitis  Epigastric pain, no blood in stools  -     famotidine (PEPCID) 40 MG tablet; Take 1 tablet (40 mg) by mouth 2 times daily    Lightheadedness  Orthostatics actually fairly benign. Is having frequent nausea. Recommend patient increase water intake and we will continue to follow closely. Would recommend laboratory workup if continuing.   -     Orthostatic blood pressure and pulse    Itching  Has been ongoing for a few months-- did not have time to fully discuss today, asked patient to come back for future visit  -     hydrocortisone (CORTAID) 1 % external cream; Apply topically 2 times daily      Weight gain adequate: 6.35 kg (14 lb) to date, out of recommended total of 25-35 lbs (pregravid BMI 18.5-24.9)    There are no Patient Instructions on file for this visit.    Return to clinic in 1-2 weeks.    Felecia Bazzi MD  I precepted today with Pete Espinoza MD.      Subjective  Concerns:     Feels weak and light headed, gets dizziness, gets blurred vision. Has headaches.     Walking is very difficult because having stomach pains and pain in the pelvic area. Can't stand up for a long time.     Nauseous most of the time and light headedness.     Constantly thirsty and drinking a lot of water.     ROS:  YES - Headache- tends to be related to her stomach,  worse when she's nauseated.   YES - Changes in vision- some blurriness  No - Chest Pain  No - Shortness of Breath  YES - Nausea   No - Vomiting  YES - Abdominal pain - both upper and lower in the pelvis, especially when walking.   No - Contractions  No - Dysuria   No - Vaginal Discharge    No - Vaginal bleeding   No - Loss of Fluid   No - Extremity swelling   Present - Fetal movement       Patient Active Problem List   Diagnosis     Supervision of high risk pregnancy, antepartum     Language barrier, cultural differences     Encounter for health examination of refugee     History of stillbirth     H/O postpartum hemorrhage, currently pregnant     Kidney stone     Hydroureteronephrosis       Chrystal Justice speaks [place]; Greenlandic so an  was used today.    Guidance:  OTC medications  weight gain and exercise    Going to [place]? Yes      Objective  /67 (BP Location: Left arm, Patient Position: Standing, Cuff Size: Adult Regular)   Pulse 94   Temp 98.6  F (37  C) (Oral)   Resp 18   Wt 54.9 kg (121 lb)   LMP 01/20/2022 (Approximate)   SpO2 98%   BMI 23.63 kg/m    No distress.  Gravid abdomen.  FHT 140s.  Fundal height 23 cm.  no edema.    Results  Blood type: AB POS  Results for orders placed or performed in visit on 06/03/22   Urine Culture     Status: None    Specimen: Urine, Midstream   Result Value Ref Range    Culture 10,000-50,000 CFU/mL Urogenital magno

## 2022-06-05 LAB — BACTERIA UR CULT: NORMAL

## 2022-06-08 NOTE — PROGRESS NOTES
Preceptor Attestation:    I discussed the patient with the resident and evaluated the patient in person. I have verified the content of the note, which accurately reflects my assessment of the patient and the plan of care.   Supervising Physician:  Pete Espinoza MD.

## 2022-06-15 ENCOUNTER — TELEPHONE (OUTPATIENT)
Dept: FAMILY MEDICINE | Facility: CLINIC | Age: 27
End: 2022-06-15
Payer: COMMERCIAL

## 2022-06-15 NOTE — TELEPHONE ENCOUNTER
Pt here for her son's appt.  Assisted her with rescheduling her ANGELINE from 6/17 to 6/21 [facility]

## 2022-06-21 ENCOUNTER — OFFICE VISIT (OUTPATIENT)
Dept: FAMILY MEDICINE | Facility: CLINIC | Age: 27
End: 2022-06-21
Payer: COMMERCIAL

## 2022-06-21 VITALS
OXYGEN SATURATION: 98 % | BODY MASS INDEX: 24.14 KG/M2 | SYSTOLIC BLOOD PRESSURE: 93 MMHG | DIASTOLIC BLOOD PRESSURE: 60 MMHG | HEART RATE: 86 BPM | RESPIRATION RATE: 18 BRPM | TEMPERATURE: 97.5 F | WEIGHT: 123.6 LBS

## 2022-06-21 DIAGNOSIS — L50.9 URTICARIA: Primary | ICD-10-CM

## 2022-06-21 DIAGNOSIS — Z32.01 PREGNANCY TEST POSITIVE: ICD-10-CM

## 2022-06-21 DIAGNOSIS — O09.90 SUPERVISION OF HIGH RISK PREGNANCY, ANTEPARTUM: ICD-10-CM

## 2022-06-21 DIAGNOSIS — L29.9 ITCHING: ICD-10-CM

## 2022-06-21 DIAGNOSIS — K21.9 GASTROESOPHAGEAL REFLUX DISEASE WITHOUT ESOPHAGITIS: ICD-10-CM

## 2022-06-21 PROCEDURE — 99207 PR PRENATAL VISIT: CPT | Mod: GC | Performed by: FAMILY MEDICINE

## 2022-06-21 RX ORDER — BENZOCAINE/MENTHOL 6 MG-10 MG
LOZENGE MUCOUS MEMBRANE 2 TIMES DAILY
Qty: 14.2 G | Refills: 1 | Status: SHIPPED | OUTPATIENT
Start: 2022-06-21 | End: 2023-11-15

## 2022-06-21 RX ORDER — PRENATAL VIT/IRON FUM/FOLIC AC 27MG-0.8MG
1 TABLET ORAL DAILY
Qty: 90 TABLET | Refills: 3 | Status: SHIPPED | OUTPATIENT
Start: 2022-06-21 | End: 2023-11-15

## 2022-06-21 RX ORDER — LORATADINE 10 MG/1
10 TABLET ORAL DAILY
Qty: 30 TABLET | Refills: 1 | Status: SHIPPED | OUTPATIENT
Start: 2022-06-21 | End: 2022-06-21

## 2022-06-21 RX ORDER — LORATADINE 10 MG/1
10 TABLET ORAL DAILY
Qty: 30 TABLET | Refills: 1 | Status: SHIPPED | OUTPATIENT
Start: 2022-06-21 | End: 2022-07-25

## 2022-06-21 RX ORDER — FAMOTIDINE 40 MG/1
40 TABLET, FILM COATED ORAL 2 TIMES DAILY
Qty: 60 TABLET | Refills: 3 | Status: SHIPPED | OUTPATIENT
Start: 2022-06-21

## 2022-06-21 RX ORDER — ACETAMINOPHEN 325 MG/1
325-650 TABLET ORAL EVERY 6 HOURS PRN
Qty: 180 TABLET | Refills: 1 | Status: SHIPPED | OUTPATIENT
Start: 2022-06-21 | End: 2023-11-13

## 2022-06-21 RX ORDER — FAMOTIDINE 40 MG/1
40 TABLET, FILM COATED ORAL 2 TIMES DAILY
Qty: 60 TABLET | Refills: 3 | Status: SHIPPED | OUTPATIENT
Start: 2022-06-21 | End: 2022-06-21

## 2022-06-21 NOTE — PROGRESS NOTES
Assessment & Plan  26 year old  at 26w3d with KVNG Sep 2022 based on LMP    Chrystal was seen today for prenatal care.    Diagnoses and all orders for this visit:    Of note- has difficulty with transportation/getting to the pharmacy. All meds were reordered to [facility] with note to pharmacy asking to set up home delivery, with pt phone number and instructions to call with  added    Supervision of high risk pregnancy, antepartum  Hx stillbirth, having growth US with MFM, next scheduled for . Due for follow up in 2 weeks with GDM and Tdap at that time  -     acetaminophen (TYLENOL) 325 MG tablet; Take 1-2 tablets (325-650 mg) by mouth every 6 hours as needed for mild pain  -     Prenatal Vit-Fe Fumarate-FA (PRENATAL MULTIVITAMIN W/IRON) 27-0.8 MG tablet; Take 1 tablet by mouth daily    Urticaria  Difficult to assess as not having symptoms/rash currently, but when looking at pictures of urticaria she said it looked like that- suspect she is having intermittent urticaria. Will try to start claritin to attempt suppressing it.   -     hydrocortisone (CORTAID) 1 % external cream; Apply topically 2 times daily  -     loratadine (CLARITIN) 10 MG tablet; Take 1 tablet (10 mg) by mouth daily    Gastroesophageal reflux disease without esophagitis  Wasn't able to  pepcid  -     acetaminophen (TYLENOL) 325 MG tablet; Take 1-2 tablets (325-650 mg) by mouth every 6 hours as needed for mild pain  -     famotidine (PEPCID) 40 MG tablet; Take 1 tablet (40 mg) by mouth 2 times daily      Weight gain adequate: 7.53 kg (16 lb 9.6 oz) to date, out of recommended total of 25-35 lbs (pregravid BMI 18.5-24.9)    There are no Patient Instructions on file for this visit.    Return to clinic in 2 weeks.    Felecia Bazzi MD  I precepted today with Alvarado Wei MD.    Diagnosis or treatment significantly limited by social determinants of health - refugee, no transportation, low  health literacy, language barrier  Prescription drug management      Subjective  Concerns:     Cortisone ointment didn't help.     Sometimes it's all over her body, lately it has been present in her hands only. Red rash. It happens after she washes her hands- itches and gets red. Will resolve on its own.   When she gets sweaty or when uses soap it also happens. Never happened before these last few months.    Still having stomach pain in the epigastric area. Wasn't able to  the famotidine.     ROS:  No - Headache  No - Changes in vision  No - Chest Pain  No - Shortness of Breath  No - Nausea   No - Vomiting  YES - Abdominal pain - epigastric pain continues  No - Contractions  No - Dysuria   No - Vaginal Discharge    No - Vaginal bleeding   No - Loss of Fluid   No - Extremity swelling   Present - Fetal movement         Risk Assessment   Average Risk Category  No significant risk factors: No      Risk: High Risk   Date determined: 2022    Patient Active Problem List   Diagnosis     Supervision of high risk pregnancy, antepartum     Language barrier, cultural differences     Encounter for health examination of refugee     History of stillbirth     H/O postpartum hemorrhage, currently pregnant     Kidney stone     Hydroureteronephrosis       Chrystal Justice speaks [place]; English so an  was used today.    Guidance:  GDM  signs of  labor    Objective  BP 93/60   Pulse 86   Temp 97.5  F (36.4  C)   Resp 18   Wt 56.1 kg (123 lb 9.6 oz)   LMP 2022 (Approximate)   SpO2 98%   BMI 24.14 kg/m    No distress.  Gravid abdomen.  FHT 130s.  Fundal height 26 cm.  no edema.    Results  Blood type: AB POS  No results found for any visits on 22.

## 2022-06-21 NOTE — NURSING NOTE
Due to patient being non-English speaking/uses sign language, an  was used for this visit. Only for face-to-face interpretation by an external agency, date and length of interpretation can be found on the scanned worksheet.     name: [id number]  Agency: [employer]  Language: Ade   Telephone number: [phone]  Type of interpretation: Telephone, spoken

## 2022-06-29 NOTE — PROGRESS NOTES
Preceptor Attestation:    I discussed the patient with the resident and evaluated the patient in person. I have verified the content of the note, which accurately reflects my assessment of the patient and the plan of care.   Supervising Physician:  Alvarado Wei MD.

## 2022-07-01 ENCOUNTER — OFFICE VISIT (OUTPATIENT)
Dept: FAMILY MEDICINE | Facility: CLINIC | Age: 27
End: 2022-07-01
Payer: COMMERCIAL

## 2022-07-01 VITALS
SYSTOLIC BLOOD PRESSURE: 99 MMHG | OXYGEN SATURATION: 98 % | TEMPERATURE: 98 F | HEART RATE: 96 BPM | RESPIRATION RATE: 20 BRPM | BODY MASS INDEX: 24.62 KG/M2 | HEIGHT: 60 IN | WEIGHT: 125.4 LBS | DIASTOLIC BLOOD PRESSURE: 63 MMHG

## 2022-07-01 DIAGNOSIS — Z34.92 PRENATAL CARE IN SECOND TRIMESTER: Primary | ICD-10-CM

## 2022-07-01 LAB
ERYTHROCYTE [DISTWIDTH] IN BLOOD BY AUTOMATED COUNT: 12.7 % (ref 10–15)
HCT VFR BLD AUTO: 32.9 % (ref 35–47)
HGB BLD-MCNC: 11.2 G/DL (ref 11.7–15.7)
MCH RBC QN AUTO: 31.5 PG (ref 26.5–33)
MCHC RBC AUTO-ENTMCNC: 34 G/DL (ref 31.5–36.5)
MCV RBC AUTO: 92 FL (ref 78–100)
PLATELET # BLD AUTO: 224 10E3/UL (ref 150–450)
RBC # BLD AUTO: 3.56 10E6/UL (ref 3.8–5.2)
WBC # BLD AUTO: 9.7 10E3/UL (ref 4–11)

## 2022-07-01 PROCEDURE — 85027 COMPLETE CBC AUTOMATED: CPT

## 2022-07-01 PROCEDURE — 36415 COLL VENOUS BLD VENIPUNCTURE: CPT

## 2022-07-01 PROCEDURE — 99207 PR PRENATAL VISIT: CPT | Mod: GC

## 2022-07-01 NOTE — NURSING NOTE
Due to patient being non-English speaking/uses sign language, an  was used for this visit. Only for face-to-face interpretation by an external agency, date and length of interpretation can be found on the scanned worksheet.            name: Crut atkins  Agency: [employer]ckenna Nichols  Language: Syriac   Telephone [phone]  Type of interpretation: Face-to-face, spoken

## 2022-07-08 ENCOUNTER — TELEPHONE (OUTPATIENT)
Dept: CARE COORDINATION | Facility: CLINIC | Age: 27
End: 2022-07-08

## 2022-07-08 NOTE — TELEPHONE ENCOUNTER
7/8/2022: Care Coordination     CC: Was called by the provider requesting that I set up transportation for an upcoming in person appointment at:[address]. [place] [address].   Phone: [phone].    Appointment Date:  7/20/2022    Appointment Time:  11:45am     time:  Between: 11:00am - 11:30am    Return Trip:  Will Call Service  [employer] [phone]      Darrion Berkowitz Sr.  Care Coordinator  [address] [address]  [email or web address]   Office: [phone]  Direct: [phone]  [facility]

## 2022-07-08 NOTE — LETTER
2022      Chrystal Justice  [address]  [place] [address]        Dear Chrystal,  I am one of the Care Coordinators at [facility]. I am writing you today to remind you of an upcoming appointment on: 2022 at 11:45am. Going to: [address] [address].[employer] will pick you up between 11:00am - 11:30 am. You will need to activate the will call service for your return trip by [phone]       Sincerely,      Darrion Berkowitz Sr.  Care Coordinator  [address] [address]  [email or web address]   Office: [phone]  Direct: [phone]  [facility]

## 2022-07-25 ENCOUNTER — OFFICE VISIT (OUTPATIENT)
Dept: FAMILY MEDICINE | Facility: CLINIC | Age: 27
End: 2022-07-25
Payer: COMMERCIAL

## 2022-07-25 VITALS
TEMPERATURE: 97.6 F | BODY MASS INDEX: 24.99 KG/M2 | HEART RATE: 85 BPM | SYSTOLIC BLOOD PRESSURE: 99 MMHG | WEIGHT: 128.4 LBS | RESPIRATION RATE: 20 BRPM | DIASTOLIC BLOOD PRESSURE: 65 MMHG | OXYGEN SATURATION: 98 %

## 2022-07-25 DIAGNOSIS — L50.9 URTICARIA: ICD-10-CM

## 2022-07-25 DIAGNOSIS — Z87.59 HISTORY OF STILLBIRTH: ICD-10-CM

## 2022-07-25 DIAGNOSIS — Z34.80 PRENATAL CARE, SUBSEQUENT PREGNANCY, UNSPECIFIED TRIMESTER: ICD-10-CM

## 2022-07-25 DIAGNOSIS — Z3A.31 31 WEEKS GESTATION OF PREGNANCY: Primary | ICD-10-CM

## 2022-07-25 LAB
ALBUMIN UR-MCNC: NEGATIVE MG/DL
APPEARANCE UR: CLEAR
BILIRUB UR QL STRIP: NEGATIVE
COLOR UR AUTO: YELLOW
GLUCOSE UR STRIP-MCNC: NEGATIVE MG/DL
HGB UR QL STRIP: NEGATIVE
KETONES UR STRIP-MCNC: NEGATIVE MG/DL
LEUKOCYTE ESTERASE UR QL STRIP: ABNORMAL
NITRATE UR QL: NEGATIVE
PH UR STRIP: 7.5 [PH] (ref 5–8)
SP GR UR STRIP: 1.02 (ref 1–1.03)
UROBILINOGEN UR STRIP-ACNC: 0.2 E.U./DL

## 2022-07-25 PROCEDURE — 87086 URINE CULTURE/COLONY COUNT: CPT

## 2022-07-25 PROCEDURE — 99207 PR PRENATAL VISIT: CPT | Mod: GC

## 2022-07-25 PROCEDURE — 81003 URINALYSIS AUTO W/O SCOPE: CPT

## 2022-07-25 RX ORDER — LORATADINE 10 MG/1
10 TABLET ORAL DAILY
Qty: 30 TABLET | Refills: 1 | Status: SHIPPED | OUTPATIENT
Start: 2022-07-25 | End: 2023-11-15

## 2022-07-25 NOTE — PROGRESS NOTES
Assessment & Plan  26 year old  at 31w2d with KVNG Sep 2022 based on LMP  23 week US    Chrystal was seen today for prenatal care.    Diagnoses and all orders for this visit:    31 weeks gestation of pregnancy  -     Urine Culture  -     UA reflex to Microscopic        Weight gain adequate: 9.707 kg (21 lb 6.4 oz) to date, out of recommended total of 25-35 lbs (pregravid BMI 18.5-24.9)    Patient Instructions   Go to Urology appointment for son on  at 1pm.     Follow up with me in 2 weeks.       Return to clinic in 2 weeks.    Yasmin Dhaliwal MD  I precepted today with Pete Espinoza MD.    Diagnosis or treatment significantly limited by social determinants of health - non-English speaking, immigrant, no transportation, limited income  Ordering of each unique test  60 minutes spent on the date of the encounter doing chart review, history and exam, documentation and further activities per the note        Subjective  Concerns: none    ROS:  No - Headache  Yes - Changes in vision, itchy eyes   No - Chest Pain  No - Shortness of Breath  No - Nausea   No - Vomiting  No - Abdominal pain   No - Contractions  No - Dysuria   No - Vaginal Discharge    No - Vaginal bleeding   No - Loss of Fluid   No - Extremity swelling   Present - Fetal movement       Going to WIC? Yes      Patient Active Problem List   Diagnosis     Supervision of high risk pregnancy, antepartum     Language barrier, cultural differences     Encounter for health examination of refugee     History of stillbirth     H/O postpartum hemorrhage, currently pregnant     Kidney stone     Hydroureteronephrosis       Chrystal Justice speaks [place]; Hebrew so an  was used today.    Guidance:  fetal growth and movement  signs of  labor    Do you need help getting a car seat? No  Do you need help getting a breast pump? No      Objective  BP 99/65   Pulse 85   Temp 97.6  F (36.4  C)   Resp 20   Wt 58.2 kg (128 lb 6.4 oz)   LMP  01/20/2022 (Approximate)   SpO2 98%   BMI 24.99 kg/m    No distress.  Gravid abdomen.  .  Fundal height 31.5 cm.  no edema.    Results  Blood type: AB POS  No results found for any visits on 07/25/22.

## 2022-07-25 NOTE — NURSING NOTE
Due to patient being non-English speaking/uses sign language, an  was used for this visit. Only for face-to-face interpretation by an external agency, date and length of interpretation can be found on the scanned worksheet.     name:[id number]  Agency: [employer]  Language: Mojgan   Telephone number: [phone]  Type of interpretation: Telephone, spoken

## 2022-07-27 LAB — BACTERIA UR CULT: NORMAL

## 2022-07-29 ENCOUNTER — OFFICE VISIT (OUTPATIENT)
Dept: MATERNAL FETAL MEDICINE | Facility: HOSPITAL | Age: 27
End: 2022-07-29
Attending: OBSTETRICS & GYNECOLOGY
Payer: COMMERCIAL

## 2022-07-29 ENCOUNTER — ANCILLARY PROCEDURE (OUTPATIENT)
Dept: ULTRASOUND IMAGING | Facility: HOSPITAL | Age: 27
End: 2022-07-29
Attending: OBSTETRICS & GYNECOLOGY
Payer: COMMERCIAL

## 2022-07-29 DIAGNOSIS — Z87.59 HISTORY OF STILLBIRTH: Primary | ICD-10-CM

## 2022-07-29 DIAGNOSIS — O09.293 HISTORY OF STILLBIRTH IN CURRENTLY PREGNANT PATIENT, THIRD TRIMESTER: ICD-10-CM

## 2022-07-29 DIAGNOSIS — Z36.89 ENCOUNTER FOR ULTRASOUND TO ASSESS FETAL GROWTH: ICD-10-CM

## 2022-07-29 PROCEDURE — 99207 PR NO CHARGE LOS: CPT | Performed by: OBSTETRICS & GYNECOLOGY

## 2022-07-29 PROCEDURE — 76816 OB US FOLLOW-UP PER FETUS: CPT | Mod: 26 | Performed by: OBSTETRICS & GYNECOLOGY

## 2022-07-29 PROCEDURE — 76816 OB US FOLLOW-UP PER FETUS: CPT

## 2022-07-29 NOTE — PROGRESS NOTES
Please see the imaging tab for details of the ultrasound performed today.    Opal Breen MD  Specialist in Maternal-Fetal Medicine

## 2022-08-05 ENCOUNTER — OFFICE VISIT (OUTPATIENT)
Dept: FAMILY MEDICINE | Facility: CLINIC | Age: 27
End: 2022-08-05
Payer: COMMERCIAL

## 2022-08-05 VITALS
RESPIRATION RATE: 16 BRPM | OXYGEN SATURATION: 98 % | HEART RATE: 89 BPM | BODY MASS INDEX: 25.38 KG/M2 | DIASTOLIC BLOOD PRESSURE: 58 MMHG | SYSTOLIC BLOOD PRESSURE: 98 MMHG | TEMPERATURE: 98.4 F | WEIGHT: 130.4 LBS

## 2022-08-05 DIAGNOSIS — Z87.59 HISTORY OF STILLBIRTH: ICD-10-CM

## 2022-08-05 DIAGNOSIS — Z34.92 PRENATAL CARE IN SECOND TRIMESTER: ICD-10-CM

## 2022-08-05 DIAGNOSIS — Z60.3 LANGUAGE BARRIER, CULTURAL DIFFERENCES: ICD-10-CM

## 2022-08-05 DIAGNOSIS — O09.90 SUPERVISION OF HIGH RISK PREGNANCY, ANTEPARTUM: Primary | ICD-10-CM

## 2022-08-05 DIAGNOSIS — O09.299 H/O POSTPARTUM HEMORRHAGE, CURRENTLY PREGNANT: ICD-10-CM

## 2022-08-05 LAB
GLUCOSE 1H P 50 G GLC PO SERPL-MCNC: 106 MG/DL (ref 70–129)
T PALLIDUM AB SER QL: NONREACTIVE

## 2022-08-05 PROCEDURE — 82950 GLUCOSE TEST: CPT

## 2022-08-05 PROCEDURE — 86780 TREPONEMA PALLIDUM: CPT

## 2022-08-05 PROCEDURE — 36415 COLL VENOUS BLD VENIPUNCTURE: CPT

## 2022-08-05 PROCEDURE — 99207 PR PRENATAL VISIT: CPT | Mod: GC

## 2022-08-05 SDOH — SOCIAL STABILITY - SOCIAL INSECURITY: ACCULTURATION DIFFICULTY: Z60.3

## 2022-08-05 NOTE — PROGRESS NOTES
Preceptor Attestation:    I discussed the patient with the resident and evaluated the patient in person. I have verified the content of the note, which accurately reflects my assessment of the patient and the plan of care.   Supervising Physician:  Pete Esipnoza MD.

## 2022-08-05 NOTE — PROGRESS NOTES
Assessment & Plan  26 year old  at 32w6d with KVNG Sep 2022 based on LMP and  23 week US    Chrystal was seen today for prenatal care.    Diagnoses and all orders for this visit:    Supervision of high risk pregnancy, antepartum  Patient has PMH of stillbirth secondary to urosepsis, one 1st trimester SAB, and one live birth were baby passed away from fever at 2 days old.    -     Glucose tolerance gest screen 1 hour  -     Syphilis Screen [facility] (RPR/VDRL)        Weight gain adequate: 10.6 kg (23 lb 6.4 oz) to date, out of recommended total of 25-35 lbs (pregravid BMI 18.5-24.9)    There are no Patient Instructions on file for this visit.    Return to clinic in 2 weeks.    Yasmin Dhaliwal MD  I precepted today with Pete Espinoza MD.    Diagnosis or treatment significantly limited by social determinants of health - recent refugees, non-English speaking  35 minutes spent on the date of the encounter doing chart review, history and exam, documentation and further activities per the note        Subjective  Concerns: none, still working on urology stuff for oldest son, missed appointment yesterday and need to reschedule     Occasional knee pain and some dizziness (mostly with standing up)    ROS:  No - Headache  YES - Changes in vision, itchy eyes  No - Chest Pain  No - Shortness of Breath  No - Nausea   No - Vomiting  No - Abdominal pain   No - Contractions  No - Dysuria   No - Vaginal Discharge    No - Vaginal bleeding   No - Loss of Fluid   No - Extremity swelling   Present - Fetal movement       Going to WIC? Yes      Patient Active Problem List   Diagnosis     Supervision of high risk pregnancy, antepartum     Language barrier, cultural differences     Encounter for health examination of refugee     History of stillbirth     H/O postpartum hemorrhage, currently pregnant     Kidney stone     Hydroureteronephrosis       Chrystal Justice speaks [place]; Wolof so an  was used today.    Guidance:   Advised drinking water and standing slowly to avoid lightheadedness    Do you need help getting a car seat? No  Do you need help getting a breast pump? No      Objective  BP 98/58 (BP Location: Left arm, Patient Position: Sitting, Cuff Size: Adult Regular)   Pulse 89   Temp 98.4  F (36.9  C) (Oral)   Resp 16   Wt 59.1 kg (130 lb 6.4 oz)   LMP 01/20/2022 (Approximate)   SpO2 98%   BMI 25.38 kg/m    No distress.  Gravid abdomen.  .  Fundal height 34 cm.  no edema.    Results  Blood type: AB POS  No results found for any visits on 08/05/22.

## 2022-08-19 ENCOUNTER — TELEPHONE (OUTPATIENT)
Dept: CARE COORDINATION | Facility: OTHER | Age: 27
End: 2022-08-19

## 2022-08-19 NOTE — TELEPHONE ENCOUNTER
2022: Care Coordination    CC: received a call from patients  stating that he and his wife's transportation never showed up for today's appointment with Dr. Dhaliwal at 11:00am.    A call was placed to the front office to inquire if there was still enough time to get Ms. Justice in today? However, I was told that we would need to reschedule.     Another call was placed to [employer] to inquire why this family was not picked up?     CC:Was told that they waited for 6 minutes and left without a call to the patient.    CC: called the family back to see what days and time work for them? Was told that anytime in the afternoon will be fine. Mr. Justice also updated his phone number during this call.    CC: called Brionna in the front office who was able to schedule the patient with Dr. Deleon on 2022 at 12:50pm.    CC: Called stephon and was able to get them a ride scheduled with [employer] with a  time between: 11:50am - 12:20pm. They will need to use the will call service for their return trip by [phone]    CC: call the patient and informed them of the appointment and transportation.      Darrion Berkowitz Sr.  Care Coordinator  [address] [address]  [email or web address]   Office: [phone]  Direct: [phone]  [facility]

## 2022-08-26 ENCOUNTER — ANCILLARY PROCEDURE (OUTPATIENT)
Dept: ULTRASOUND IMAGING | Facility: HOSPITAL | Age: 27
End: 2022-08-26
Attending: OBSTETRICS & GYNECOLOGY
Payer: COMMERCIAL

## 2022-08-26 ENCOUNTER — OFFICE VISIT (OUTPATIENT)
Dept: MATERNAL FETAL MEDICINE | Facility: HOSPITAL | Age: 27
End: 2022-08-26
Attending: OBSTETRICS & GYNECOLOGY
Payer: COMMERCIAL

## 2022-08-26 DIAGNOSIS — O09.293 HISTORY OF STILLBIRTH IN CURRENTLY PREGNANT PATIENT, THIRD TRIMESTER: Primary | ICD-10-CM

## 2022-08-26 DIAGNOSIS — O09.293 HISTORY OF STILLBIRTH IN CURRENTLY PREGNANT PATIENT, THIRD TRIMESTER: ICD-10-CM

## 2022-08-26 PROCEDURE — 76816 OB US FOLLOW-UP PER FETUS: CPT | Mod: 26 | Performed by: OBSTETRICS & GYNECOLOGY

## 2022-08-26 PROCEDURE — 99207 PR NO CHARGE LOS: CPT | Performed by: OBSTETRICS & GYNECOLOGY

## 2022-08-26 PROCEDURE — 76816 OB US FOLLOW-UP PER FETUS: CPT

## 2022-08-26 NOTE — PROGRESS NOTES
"Please see \"Imaging\" tab under \"Chart Review\" for details of today's visit.    Vargas Banegas    "

## 2022-09-16 ENCOUNTER — OFFICE VISIT (OUTPATIENT)
Dept: FAMILY MEDICINE | Facility: CLINIC | Age: 27
End: 2022-09-16
Payer: COMMERCIAL

## 2022-09-16 VITALS
OXYGEN SATURATION: 97 % | HEART RATE: 98 BPM | SYSTOLIC BLOOD PRESSURE: 106 MMHG | DIASTOLIC BLOOD PRESSURE: 69 MMHG | HEIGHT: 61 IN | WEIGHT: 138 LBS | RESPIRATION RATE: 16 BRPM | BODY MASS INDEX: 26.06 KG/M2 | TEMPERATURE: 97.9 F

## 2022-09-16 DIAGNOSIS — O09.90 SUPERVISION OF HIGH RISK PREGNANCY, ANTEPARTUM: ICD-10-CM

## 2022-09-16 DIAGNOSIS — Z12.4 CERVICAL CANCER SCREENING: Primary | ICD-10-CM

## 2022-09-16 PROCEDURE — 99207 PR PRENATAL VISIT: CPT | Mod: GC

## 2022-09-16 PROCEDURE — 87653 STREP B DNA AMP PROBE: CPT

## 2022-09-16 RX ORDER — ACETAMINOPHEN 500 MG
500-1000 TABLET ORAL EVERY 6 HOURS PRN
Qty: 100 TABLET | Refills: 3 | Status: SHIPPED | OUTPATIENT
Start: 2022-09-16

## 2022-09-16 NOTE — PROGRESS NOTES
Assessment & Plan  Chrystal Justice is a 26 year old , at 38w6d weeks of pregnancy with KVNG of Sep 24, 2022 by LMP consistent with 23 week ultrasound.  Patient's history is high risk for the following reasons: PMH of urosepsis leading to stillbirth, one first trimester SAB, and one live birth were baby passed away from fever at 2 days old, and current pregnancy tracking 98th %tile on last ultrasound.     # High risk concerns (medical problems, high risk surgical/obstetric history, high risk social situation, etc): PMH of urosepsis leading to stillbirth, one first trimester SAB, and one live birth were baby passed away from fever at 2 days old, and current pregnancy tracking 98th %tile on last ultrasound.  - In addition to routine prenatal care, patient will need induction at 39 weeks.  - Problem list reviewed and updated.    Weight gain adequate: 14.1 kg (31 lb) to date, out of recommended total of 25-35 lbs (pregravid BMI 18.5-24.9)      Return to clinic in 3-4 days for discussion of IOL.     Yasmin Dhaliwal MD  I precepted today with Denys Salamanca MD.    Diagnosis or treatment significantly limited by social determinants of health - non-English speaking, refugees, limited transportation  Ordering of each unique test  Prescription drug management  40 minutes spent on the date of the encounter doing chart review, history and exam, documentation and further activities per the note        Subjective  Concerns: flank and belly pain so bad as to unable to walk, worse each day, daily, worst at night, belly feels like burning (flank), hasn't tried anything yet, no known aggravating factors    When patient points to pain, points to upper swell of belly where it meets her ribs as well as lowest point of belly where it meets her thighs.     ROS:  No - Headache  No - Changes in vision  No - Chest Pain  No - Shortness of Breath  No - Nausea   No - Vomiting  No - Abdominal pain   Yes - Contractions, intermittent, 2-3 times  "a day  No - Dysuria   No - Vaginal Discharge    No - Vaginal bleeding   No - Loss of Fluid   Yes - Extremity swelling, feet  Present - Fetal movement       Going to WIC? Yes      Patient Active Problem List   Diagnosis     Supervision of high risk pregnancy, antepartum     Language barrier, cultural differences     Encounter for health examination of refugee     History of stillbirth     H/O postpartum hemorrhage, currently pregnant     Kidney stone     Hydroureteronephrosis       Chrystal Justice speaks [place]; Korean so an  was used today.    Guidance:    Discussed the importance of early induction of labor. Chrystal was a little reluctant. We reached a compromise where she will come in early next week and we will talk about induction if she has not delivered naturally by then.    Patient and spouse reported concerns for transportation to [facility] as they do not have a car. They stated that they may need to go to [facility] if they are unable to provide transport at time of labor.     GBS  signs of labor  pain control during labor    -discussed side effects and safety, family knew someone who had difficulty walking after getting one after anesthesia wore off    Do you need help getting a car seat? No  Do you need help getting a breast pump? No      Objective  /69 (BP Location: Left arm, Patient Position: Sitting, Cuff Size: Adult Regular)   Pulse 98   Temp 97.9  F (36.6  C) (Oral)   Resp 16   Ht 1.537 m (5' 0.5\")   Wt 62.6 kg (138 lb)   LMP 01/20/2022 (Approximate)   SpO2 97%   BMI 26.51 kg/m    No distress.  Gravid abdomen.  FHT 141bpm.  Fundal height 138 cm.  trace nonpitting edema.  Cervix: very posterior, closed, long and thick    Results  Blood type: AB POS  No results found for any visits on 09/16/22.    "

## 2022-09-16 NOTE — PROGRESS NOTES
Preceptor Attestation:    I discussed the patient with the resident and evaluated the patient in person. I have verified the content of the note, which accurately reflects my assessment of the patient and the plan of care.   Supervising Physician:  Denys Salamanca MD.

## 2022-09-16 NOTE — Clinical Note
Hey, I've been following this patient for some time but I haven't been receiving her US results until recently due to Megan Dodd still listed as her OBGYN provider. Fixed that and found out she is tracking 98th%tile. Going to have her follow up in clinic early next week and try to set up induction. Family has some transportation limits due to no car, so I'm thinking if we can get a ride set up to [employer] for them, that will make things a lot easier.  Let me know what I can do to help salvage this oopsie on my part.  Yasmin Dhaliwal MD

## 2022-09-17 LAB — GP B STREP DNA SPEC QL NAA+PROBE: NEGATIVE

## 2022-09-21 ENCOUNTER — OFFICE VISIT (OUTPATIENT)
Dept: FAMILY MEDICINE | Facility: CLINIC | Age: 27
End: 2022-09-21
Payer: COMMERCIAL

## 2022-09-21 VITALS
HEART RATE: 93 BPM | BODY MASS INDEX: 26.43 KG/M2 | DIASTOLIC BLOOD PRESSURE: 75 MMHG | TEMPERATURE: 98.3 F | SYSTOLIC BLOOD PRESSURE: 111 MMHG | RESPIRATION RATE: 16 BRPM | OXYGEN SATURATION: 98 % | WEIGHT: 137.6 LBS

## 2022-09-21 DIAGNOSIS — Z32.01 PREGNANCY TEST POSITIVE: ICD-10-CM

## 2022-09-21 PROCEDURE — 59426 ANTEPARTUM CARE ONLY: CPT | Mod: GC

## 2022-09-21 NOTE — PATIENT INSTRUCTIONS
Induction of Labor at [facility]  [facility]  [address] [place] [address]    Phone: [phone]    APPT: Friday, 9/23/22 at 7:00 AM [facility]    RIDE: [employer] [phone] will  at 6:30 AM to bring you to the appointment.  The hospital can help with the return ride home.

## 2022-09-21 NOTE — PROGRESS NOTES
Assessment & Plan  Chrystal Justice is a 26 year old , at 39w4d weeks of pregnancy with KVNG of Sep 24, 2022 by LMP consistent with 23 week ultrasound.  Patient's history is high risk for the following reasons: PMH of urosepsis leading to stillbirth, one first trimester SAB, and one live birth were baby passed away from fever at 2 days old, and current pregnancy tracking 98th %tile on last ultrasound.     # High risk concerns (medical problems, high risk surgical/obstetric history, high risk social situation, etc): PMH of urosepsis leading to stillbirth, one first trimester SAB, and one live birth were baby passed away from fever at 2 days old, and current pregnancy tracking 98th %tile on last ultrasound. Patient and family also have a toddler at home to care for and no transportation of their own. Family are recent refugees from [place].   - In addition to routine prenatal care, patient will need IOL this Friday at 10am.  - Problem list reviewed and updated.    Weight gain adequate: 13.9 kg (30 lb 9.6 oz) to date, out of recommended total of 25-35 lbs (pregravid BMI 18.5-24.9)    Patient Instructions   Induction of Labor at [facility]  [facility]  [facility] [place] [address]    Phone: [phone]    APPT: Friday, 22 at 7:00 AM at [facility]    RIDE: [employer] [phone] will  at 6:30 AM to bring you to the appointment.  The hospital can help with the return ride home.  NB: we changed the appointment and the car ride to reflect this. New pickup time is 9:30am and new appointment time is 10am.     Yasmin Dhaliwal MD PGY-2   I precepted today with Evie Valencia MD.    Diagnosis or treatment significantly limited by social determinants of health - limited transportation, non-English speaking, refugee, limited finances  Ordering of each unique test  90 minutes spent on the date of the encounter doing chart review, history and  exam, documentation and further activities per the note        Subjective  Concerns: Discussed importance of IOL due to projected size of baby. Family has a lot of social concerns including no personal transportation, far distance from home to [facility] where we would be scheduling the induction, tolerate home that needs caring for, new refugee status and unfamiliarity with [place] birthing systems, and previous induction of labor resulting in blood transfusion due to hemorrhage.    Discussed concerns with family explained importance of induction at this time.  Family was amenable to it this Friday.  Scheduled visit accordingly.    ROS:  No - Headache  No - Changes in vision  No - Chest Pain  No - Shortness of Breath  No - Nausea   No - Vomiting  YES - Abdominal pain, flank pain from last visit still continuing see note 9/16/2022.  Family was unable to  the Tylenol from the pharmacy due to transportation issues.  No - Contractions  No - Dysuria   No - Vaginal Discharge    No - Vaginal bleeding   No - Loss of Fluid   Yes - Extremity swelling, feet   Present - Fetal movement       Going to WIC? Yes      Patient Active Problem List   Diagnosis     Supervision of high risk pregnancy, antepartum     Language barrier, cultural differences     Encounter for health examination of refugee     History of stillbirth     H/O postpartum hemorrhage, currently pregnant     Kidney stone     Hydroureteronephrosis       Chrystal Justice speaks [place]; Maori so an  was used today.    Guidance:   discussed induction of labor and the importance of going.    Do  you need help getting a car seat? No  Do you need help getting a breast pump? No      Objective  /75 (BP Location: Left arm, Patient Position: Sitting, Cuff Size: Adult Regular)   Pulse 93   Temp 98.3  F (36.8  C) (Oral)   Resp 16   Wt 62.4 kg (137 lb 9.6 oz)   LMP 01/20/2022 (Approximate)   SpO2 98%   BMI 26.43 kg/m    No distress.  Gravid  abdomen.     Results  Blood type: AB POS  No results found for any visits on 09/21/22.

## 2022-09-23 ENCOUNTER — ANESTHESIA (OUTPATIENT)
Dept: OBGYN | Facility: CLINIC | Age: 27
End: 2022-09-23
Payer: COMMERCIAL

## 2022-09-23 ENCOUNTER — APPOINTMENT (OUTPATIENT)
Dept: ULTRASOUND IMAGING | Facility: CLINIC | Age: 27
End: 2022-09-23
Payer: COMMERCIAL

## 2022-09-23 ENCOUNTER — ANESTHESIA EVENT (OUTPATIENT)
Dept: OBGYN | Facility: CLINIC | Age: 27
End: 2022-09-23
Payer: COMMERCIAL

## 2022-09-23 PROCEDURE — 250N000009 HC RX 250: Performed by: STUDENT IN AN ORGANIZED HEALTH CARE EDUCATION/TRAINING PROGRAM

## 2022-09-23 PROCEDURE — 250N000011 HC RX IP 250 OP 636: Performed by: STUDENT IN AN ORGANIZED HEALTH CARE EDUCATION/TRAINING PROGRAM

## 2022-09-23 PROCEDURE — 258N000003 HC RX IP 258 OP 636: Performed by: NURSE ANESTHETIST, CERTIFIED REGISTERED

## 2022-09-23 PROCEDURE — 250N000011 HC RX IP 250 OP 636: Performed by: ANESTHESIOLOGY

## 2022-09-23 PROCEDURE — 250N000011 HC RX IP 250 OP 636: Performed by: NURSE ANESTHETIST, CERTIFIED REGISTERED

## 2022-09-23 PROCEDURE — 76816 OB US FOLLOW-UP PER FETUS: CPT

## 2022-09-23 PROCEDURE — 250N000009 HC RX 250: Performed by: NURSE ANESTHETIST, CERTIFIED REGISTERED

## 2022-09-23 RX ORDER — SODIUM CHLORIDE, SODIUM LACTATE, POTASSIUM CHLORIDE, CALCIUM CHLORIDE 600; 310; 30; 20 MG/100ML; MG/100ML; MG/100ML; MG/100ML
INJECTION, SOLUTION INTRAVENOUS CONTINUOUS PRN
Status: DISCONTINUED | OUTPATIENT
Start: 2022-09-23 | End: 2022-09-23

## 2022-09-23 RX ORDER — FENTANYL CITRATE 50 UG/ML
INJECTION, SOLUTION INTRAMUSCULAR; INTRAVENOUS PRN
Status: DISCONTINUED | OUTPATIENT
Start: 2022-09-23 | End: 2022-09-23

## 2022-09-23 RX ORDER — MORPHINE SULFATE 1 MG/ML
INJECTION, SOLUTION EPIDURAL; INTRATHECAL; INTRAVENOUS PRN
Status: DISCONTINUED | OUTPATIENT
Start: 2022-09-23 | End: 2022-09-23

## 2022-09-23 RX ORDER — EPHEDRINE SULFATE 50 MG/ML
INJECTION, SOLUTION INTRAMUSCULAR; INTRAVENOUS; SUBCUTANEOUS PRN
Status: DISCONTINUED | OUTPATIENT
Start: 2022-09-23 | End: 2022-09-23

## 2022-09-23 RX ORDER — ONDANSETRON 2 MG/ML
INJECTION INTRAMUSCULAR; INTRAVENOUS PRN
Status: DISCONTINUED | OUTPATIENT
Start: 2022-09-23 | End: 2022-09-23

## 2022-09-23 RX ADMIN — PHENYLEPHRINE HYDROCHLORIDE 0.3 MCG/KG/MIN: 10 INJECTION INTRAVENOUS at 22:41

## 2022-09-23 RX ADMIN — FENTANYL CITRATE 50 MCG: 50 INJECTION, SOLUTION INTRAMUSCULAR; INTRAVENOUS at 23:05

## 2022-09-23 RX ADMIN — SODIUM CHLORIDE, POTASSIUM CHLORIDE, SODIUM LACTATE AND CALCIUM CHLORIDE: 600; 310; 30; 20 INJECTION, SOLUTION INTRAVENOUS at 22:34

## 2022-09-23 RX ADMIN — TRANEXAMIC ACID 1 G: 1 INJECTION, SOLUTION INTRAVENOUS at 22:37

## 2022-09-23 RX ADMIN — KETOROLAC TROMETHAMINE 15 MG: 30 INJECTION, SOLUTION INTRAMUSCULAR at 23:32

## 2022-09-23 RX ADMIN — MORPHINE SULFATE 0.2 MG: 1 INJECTION, SOLUTION EPIDURAL; INTRATHECAL; INTRAVENOUS at 22:36

## 2022-09-23 RX ADMIN — ONDANSETRON 4 MG: 2 INJECTION INTRAMUSCULAR; INTRAVENOUS at 23:08

## 2022-09-23 RX ADMIN — PHENYLEPHRINE HYDROCHLORIDE 100 MCG: 10 INJECTION INTRAVENOUS at 22:41

## 2022-09-23 RX ADMIN — Medication 5 MG: at 22:43

## 2022-09-23 RX ADMIN — BUPIVACAINE HYDROCHLORIDE IN DEXTROSE 1.6 ML: 7.5 INJECTION, SOLUTION SUBARACHNOID at 22:38

## 2022-09-23 RX ADMIN — PHENYLEPHRINE HYDROCHLORIDE 100 MCG: 10 INJECTION INTRAVENOUS at 22:43

## 2022-09-23 RX ADMIN — FENTANYL CITRATE 50 MCG: 50 INJECTION, SOLUTION INTRAMUSCULAR; INTRAVENOUS at 23:10

## 2022-09-23 RX ADMIN — Medication 340 ML/HR: at 23:01

## 2022-09-23 RX ADMIN — Medication 1.8 MG: at 23:18

## 2022-09-23 RX ADMIN — CEFAZOLIN SODIUM 2 G: 2 INJECTION, SOLUTION INTRAVENOUS at 22:42

## 2022-09-24 PROCEDURE — 250N000011 HC RX IP 250 OP 636: Performed by: ANESTHESIOLOGY

## 2022-09-24 PROCEDURE — C9290 INJ, BUPIVACAINE LIPOSOME: HCPCS | Performed by: ANESTHESIOLOGY

## 2022-09-24 RX ORDER — BUPIVACAINE HYDROCHLORIDE 7.5 MG/ML
INJECTION, SOLUTION INTRASPINAL
Status: DISCONTINUED | OUTPATIENT
Start: 2022-09-24 | End: 2022-09-23

## 2022-09-24 RX ORDER — BUPIVACAINE HYDROCHLORIDE 2.5 MG/ML
INJECTION, SOLUTION EPIDURAL; INFILTRATION; INTRACAUDAL
Status: DISCONTINUED | OUTPATIENT
Start: 2022-09-24 | End: 2022-09-24

## 2022-09-24 RX ORDER — MORPHINE SULFATE 1 MG/ML
INJECTION, SOLUTION EPIDURAL; INTRATHECAL; INTRAVENOUS
Status: DISCONTINUED | OUTPATIENT
Start: 2022-09-23 | End: 2022-09-24

## 2022-09-24 RX ADMIN — BUPIVACAINE 20 ML: 13.3 INJECTION, SUSPENSION, LIPOSOMAL INFILTRATION at 00:12

## 2022-09-24 RX ADMIN — BUPIVACAINE HYDROCHLORIDE 20 ML: 2.5 INJECTION, SOLUTION EPIDURAL; INFILTRATION; INTRACAUDAL at 00:12

## 2022-09-24 NOTE — ANESTHESIA PREPROCEDURE EVALUATION
Anesthesia Pre-Procedure Evaluation    Patient: Chrystal Justice   MRN: [id number] : 1995        Procedure : Procedure(s):   SECTION  Cervical Ripening       Past Medical History:   Diagnosis Date     Kidney stone       Past Surgical History:   Procedure Laterality Date     NO HISTORY OF SURGERY        No Known Allergies   Social History     Tobacco Use     Smoking status: Never Smoker     Smokeless tobacco: Never Used   Substance Use Topics     Alcohol use: Never      Wt Readings from Last 1 Encounters:   22 62.4 kg (137 lb 9.6 oz)        Anesthesia Evaluation   Pt has not had prior anesthetic         ROS/MED HX  ENT/Pulmonary:  - neg pulmonary ROS     Neurologic:  - neg neurologic ROS     Cardiovascular:  - neg cardiovascular ROS     METS/Exercise Tolerance:     Hematologic:  - neg hematologic  ROS     Musculoskeletal:       GI/Hepatic:  - neg GI/hepatic ROS     Renal/Genitourinary:       Endo:  - neg endo ROS     Psychiatric/Substance Use:  - neg psychiatric ROS     Infectious Disease:       Malignancy:       Other:            Physical Exam    Airway             Respiratory Devices and Support         Dental  no notable dental history         Cardiovascular   cardiovascular exam normal          Pulmonary   pulmonary exam normal                OUTSIDE LABS:  CBC:   Lab Results   Component Value Date    WBC 9.0 2022    WBC 9.7 2022    HGB 11.2 (L) 2022    HGB 11.2 (L) 2022    HCT 34.4 (L) 2022    HCT 32.9 (L) 2022     2022     2022     BMP:   Lab Results   Component Value Date     (L) 2022    POTASSIUM 4.0 2022    CHLORIDE 107 2022    CO2 19 (L) 2022    BUN 9 2022    CR 0.55 (L) 2022    GLC 79 2022     COAGS: No results found for: PTT, INR, FIBR  POC:   Lab Results   Component Value Date    HCG Positive (A) 2022     HEPATIC:   Lab Results   Component Value Date    ALBUMIN 3.4 (L)  04/05/2022    PROTTOTAL 6.3 04/05/2022    ALT <9 04/05/2022    AST 11 04/05/2022    ALKPHOS 54 04/05/2022    BILITOTAL 0.4 04/05/2022     OTHER:   Lab Results   Component Value Date    DIOR 8.8 04/05/2022       Anesthesia Plan    ASA Status:  2      Anesthesia Type: Spinal.              Consents    Anesthesia Plan(s) and associated risks, benefits, and realistic alternatives discussed. Questions answered and patient/representative(s) expressed understanding.    - Discussed:     - Discussed with:  Patient, Spouse         Postoperative Care            Comments:           neg OB ROS.       Mukund Andres MD

## 2022-09-24 NOTE — ANESTHESIA POSTPROCEDURE EVALUATION
Patient: Chrystal Justice    Procedure: Procedure(s):   SECTION  Cervical Ripening    Anesthesia Type:  No value filed.    Note:  Disposition: Inpatient   Postop Pain Control: Uneventful            Sign Out: Well controlled pain   PONV: No   Neuro/Psych: Uneventful            Sign Out: Acceptable/Baseline neuro status   Airway/Respiratory: Uneventful            Sign Out: Acceptable/Baseline resp. status   CV/Hemodynamics: Uneventful            Sign Out: Acceptable CV status; No obvious hypovolemia; No obvious fluid overload   Other NRE: NONE   DID A NON-ROUTINE EVENT OCCUR? No           Last vitals:  Vitals:    22 1900 22 1945 22 2145   BP:  107/67 113/69   Resp: 16  16   Temp: 37.1  C (98.8  F)  37  C (98.6  F)       Electronically Signed By: Mukund Andres MD  2022  12:16 AM

## 2022-09-24 NOTE — ADDENDUM NOTE
Addendum  created 09/24/22 0023 by Mukund Andres MD    Order list changed, Order sets accessed

## 2022-09-24 NOTE — ANESTHESIA PROCEDURE NOTES
TAP Procedure Note    Pre-Procedure   Staff -        Anesthesiologist:  Mukund Andres MD       Performed By: anesthesiologist       Location: OR       Procedure Start/Stop Times: 9/23/2022 11:45 PM and 9/23/2022 11:50 PM       Pre-Anesthestic Checklist: patient identified, IV checked, site marked, risks and benefits discussed, informed consent, monitors and equipment checked, pre-op evaluation, at physician/surgeon's request and post-op pain management  Timeout:       Correct Patient: Yes        Correct Procedure: Yes        Correct Site: Yes        Correct Position: Yes        Correct Laterality: Yes        Site Marked: Yes  Procedure Documentation  Procedure: TAP       Laterality: bilateral       Patient Position: supine       Patient Prep/Sterile Barriers: sterile gloves, mask       Skin prep: Chloraprep       Needle Type: short bevel       Needle Gauge: 20.        Needle Length (Inches): 6        Ultrasound guided       1. Ultrasound was used to identify targeted nerve, plexus, vascular marker, or fascial plane and place a needle adjacent to it in real-time.       2. Ultrasound was used to visualize the spread of anesthetic in close proximity to the above referenced structure.       3. A permanent image is entered into the patient's record.       4. The visualized anatomic structures appeared normal.       5. There were no apparent abnormal pathologic findings.    Assessment/Narrative         The placement was negative for: blood aspirated, painful injection and site bleeding       Paresthesias: No.       Bolus given via needle. no blood aspirated via catheter.        Secured via.        Insertion/Infusion Method: Single Shot       Complications: none    Medication(s) Administered   Bupivacaine 0.25% PF (Infiltration) - Infiltration   20 mL - 9/24/2022 12:12:00 AM  Bupivacaine liposome (Exparel) 1.3% LA inj susp (Infiltration) - Infiltration   20 mL - 9/24/2022 12:12:00 AM  Medication Administration Time:  9/23/2022 11:45 PM

## 2022-09-24 NOTE — ANESTHESIA PROCEDURE NOTES
Intrathecal injection Procedure Note    Pre-Procedure   Staff -        Anesthesiologist:  Mukund Andres MD       Performed By: anesthesiologist       Location: OR       Procedure Start/Stop Times: 9/23/2022 10:36 PM and 9/23/2022 10:38 PM       Pre-Anesthestic Checklist: patient identified, IV checked, risks and benefits discussed, informed consent, monitors and equipment checked, pre-op evaluation, at physician/surgeon's request and post-op pain management  Timeout:       Correct Patient: Yes        Correct Procedure: Yes        Correct Site: Yes        Correct Position: Yes   Procedure Documentation  Procedure: intrathecal injection       Patient Position: sitting       Patient Prep/Sterile Barriers: sterile gloves, mask       Skin prep: Chloraprep       Insertion Site: L3-4. (midline approach).       Needle Gauge: 24.        Needle Length (Inches): 3.5        Spinal Needle Type: Pencan       Introducer used       # of attempts: 2 and  # of redirects:     Assessment/Narrative         Paresthesias: No.       CSF fluid: clear.       Opening pressure was cmH2O while  Sitting.      Medication(s) Administered   0.75% Hyperbaric Bupivacaine (Intrathecal) - Intrathecal   1.6 mL - 9/23/2022 10:38:00 PM  Morphine PF 1 mg/mL (Intrathecal) - Intrathecal   0.2 mg - 9/23/2022 10:36:00 PM  Medication Administration Time: 9/23/2022 10:36 PM

## 2022-09-24 NOTE — ANESTHESIA CARE TRANSFER NOTE
Patient: Chrystal Justice    Procedure: Procedure(s):   SECTION  Cervical Ripening    Diagnosis: Supervision of high risk pregnancy, antepartum [O09.90]  Cephalopelvic disproportion due to unusually large fetus, fetus 1 of multiple gestation [O33.5XX1]  Diagnosis Additional Information: No value filed.    Anesthesia Type:   Spinal     Note:  Anesthesia Care Transfer Notewriter  Vitals:  Vitals Value Taken Time   /73 22 0009   Temp     Pulse     Resp     SpO2 98 % 22 0014   Vitals shown include unvalidated device data.    Electronically Signed By: Mukund Andres MD  2022  12:18 AM

## 2022-09-24 NOTE — ANESTHESIA CARE TRANSFER NOTE
Patient: Chrystal Justice    Procedure: Procedure(s):   SECTION  Cervical Ripening    Diagnosis: Supervision of high risk pregnancy, antepartum [O09.90]  Cephalopelvic disproportion due to unusually large fetus, fetus 1 of multiple gestation [O33.5XX1]  Diagnosis Additional Information: No value filed.    Anesthesia Type:   Spinal     Note:      Level of Consciousness: awake  Oxygen Supplementation: room air    Independent Airway: airway patency satisfactory and stable    Vital Signs Stable: post-procedure vital signs reviewed and stable  Report to RN Given: handoff report given  Patient transferred to: Labor and Delivery    Handoff Report: Identifed the Patient, Identified the Reponsible Provider, Reviewed the pertinent medical history, Discussed the surgical course, Reviewed Intra-OP anesthesia mangement and issues during anesthesia, Set expectations for post-procedure period and Allowed opportunity for questions and acknowledgement of understanding      Vitals:  Vitals Value Taken Time   /73 22 0009   Temp     Pulse     Resp     SpO2 98 % 22 0014   Vitals shown include unvalidated device data.    Electronically Signed By: Mukund Andres MD  2022  12:17 AM

## 2022-09-26 PROBLEM — D62 ANEMIA DUE TO BLOOD LOSS, ACUTE: Status: ACTIVE | Noted: 2022-09-26

## 2022-09-26 PROBLEM — U07.1 INFECTION DUE TO 2019 NOVEL CORONAVIRUS: Status: ACTIVE | Noted: 2022-09-26

## 2022-09-26 PROBLEM — Z98.891 S/P C-SECTION: Status: ACTIVE | Noted: 2022-09-26

## 2022-10-07 ENCOUNTER — TRANSFERRED RECORDS (OUTPATIENT)
Dept: HEALTH INFORMATION MANAGEMENT | Facility: CLINIC | Age: 27
End: 2022-10-07

## 2022-10-10 PROBLEM — O09.90 SUPERVISION OF HIGH RISK PREGNANCY, ANTEPARTUM: Status: RESOLVED | Noted: 2022-04-15 | Resolved: 2022-10-10

## 2022-10-10 PROBLEM — Z87.59 HISTORY OF STILLBIRTH: Status: RESOLVED | Noted: 2022-04-15 | Resolved: 2022-10-10

## 2022-10-10 PROBLEM — O09.299 H/O POSTPARTUM HEMORRHAGE, CURRENTLY PREGNANT: Status: RESOLVED | Noted: 2022-04-15 | Resolved: 2022-10-10

## 2022-10-10 PROBLEM — N13.30 HYDROURETERONEPHROSIS: Status: RESOLVED | Noted: 2022-04-12 | Resolved: 2022-10-10

## 2022-10-13 ENCOUNTER — TELEPHONE (OUTPATIENT)
Dept: FAMILY MEDICINE | Facility: CLINIC | Age: 27
End: 2022-10-13

## 2022-10-13 NOTE — TELEPHONE ENCOUNTER
Called with [facility] GILLILAND) and spoke with pt and her .  Pt states that she was resting in bed 5 days ago and her baby pushed/stepped on her c-sect incision.  Since that time she has been having pain and swelling.  She denies redness and drainage but states that there are hard spots along the incision.  Assisted them with scheduling an appt for tomorrow, 10/14/22 at 3:30 PM with Dr Dhaliwal and scheduled their  at 3:10 PM for growth follow-up.  Dad requested a ride be arranged.    He states that they received a bill for $800 and are wondering why insurance did not pay?  Asked him to bring the bill and will have Lola ROBERTS see them to discuss.    Dad states that they have not heard back from urology to schedule the circumcision and would like help with scheduling this ASAP!

## 2022-10-14 ENCOUNTER — OFFICE VISIT (OUTPATIENT)
Dept: FAMILY MEDICINE | Facility: CLINIC | Age: 27
End: 2022-10-14
Payer: COMMERCIAL

## 2022-10-14 VITALS
TEMPERATURE: 98.2 F | RESPIRATION RATE: 16 BRPM | SYSTOLIC BLOOD PRESSURE: 123 MMHG | WEIGHT: 122 LBS | OXYGEN SATURATION: 98 % | DIASTOLIC BLOOD PRESSURE: 82 MMHG | BODY MASS INDEX: 23.43 KG/M2 | HEART RATE: 60 BPM

## 2022-10-14 DIAGNOSIS — L76.82 PAIN AT SURGICAL INCISION: Primary | ICD-10-CM

## 2022-10-14 PROCEDURE — 99213 OFFICE O/P EST LOW 20 MIN: CPT | Mod: GC

## 2022-10-14 NOTE — PROGRESS NOTES
"  Assessment & Plan     Pain at surgical incision  Patient experienced pain 10/7/2022 after 15-month-old stepped on her  incision.  Had worsening pain and swelling for a few days, which calm down over time.  Likely what happened is toddler stepped on the scar and broke up some of the scar tissue resulting in swelling and small localized bleeding internally.  No signs of internal bleeding on exam today.  -Take extra dose of either Tylenol, ibuprofen, or oxycodone depending on level of pain as needed until pain resolves      Diagnosis or treatment significantly limited by social determinants of health - Refugee, non-English speaking, limited health literacy  Ordering of each unique test  Prescription drug management  40 minutes spent on the date of the encounter doing chart review, history and exam, documentation and further activities per the note       BMI:   Estimated body mass index is 23.43 kg/m  as calculated from the following:    Height as of 22: 1.537 m (5' 0.5\").    Weight as of this encounter: 55.3 kg (122 lb).   Weight management plan: Patient BMI is now down to 23.43.  Recovering from pregnancy.  We will continue to track.      Return if symptoms worsen or fail to improve.    Yasmin Dhaliwal MD  [facility] SCOOTER Jarrell is a 26 year old accompanied by her spouse, son and , presenting for the following health issues:  No chief complaint on file.      HPI   Patient comes in today accompanied by , son, and .    Patient was laying on the ground when her [id number]-qbx son stepped on her surgical scar, noticed immediate pain afterwards.  Swelling was noticed around the area by both herself and her .  Has gone down over the past few days, but is still painful to touch.  She says she also noticed hard lumps underneath.  This occurred approximately 10/7/2022.    No fevers. Has some pain meds at home from her OB/GYN.        Review of " Systems   Constitutional, HEENT, cardiovascular, pulmonary, gi and gu systems are negative, except as otherwise noted.      Objective    /82 (BP Location: Left arm, Patient Position: Sitting, Cuff Size: Adult Regular)   Pulse 60   Temp 98.2  F (36.8  C) (Oral)   Resp 16   Wt 55.3 kg (122 lb)   SpO2 98%   BMI 23.43 kg/m    Body mass index is 23.43 kg/m .  Physical Exam   GENERAL: healthy, alert and no distress  NECK: no adenopathy, no asymmetry, masses, or scars and thyroid normal to palpation  RESP: lungs clear to auscultation - no rales, rhonchi or wheezes  CV: regular rate and rhythm, normal S1 S2, no S3 or S4, no murmur, click or rub, no peripheral edema and peripheral pulses strong  ABDOMEN: soft, tender along  incision, L>R, more palpable scar tissue on R>L, no swelling on exam alongside incision, incision is clean, dry, intact, no erythema, no hepatosplenomegaly, no masses and bowel sounds normal, no guarding or rebound tenderness  MS: no gross musculoskeletal defects noted, no edema, negative psoas sign  PSYCH: mentation appears normal, affect normal/bright        ----- Service Performed and Documented by Resident or Fellow ------

## 2022-10-14 NOTE — TELEPHONE ENCOUNTER
Kelvin arranged with [employer] and they will  promptly at 2:15 PM.    DAVID ROBERTS at [facility] with their new phone number and asked her to call the family to assist with scheduling the circumcision, transportation, and pre-op./NG

## 2022-10-14 NOTE — TELEPHONE ENCOUNTER
Tried calling pt/ with Mo[facility] (I) but there was no answer.  LM that [employer] will pick them up at 2:15 PM.      Sent message to Lola ROBERTS to let parents know that nurse gave Urology clinic their new phone number and asked them to call family to schedule circ for oldest son./KERLINE

## 2022-10-14 NOTE — PATIENT INSTRUCTIONS
Take extra doses of ibuprofen, Tylenol or oxycodone as needed for abdominal pain.      If pain or swelling worsens again, call the clinic.

## 2022-10-14 NOTE — NURSING NOTE
Due to patient being non-English speaking/uses sign language, an  was used for this visit. Only for face-to-face interpretation by an external agency, date and length of interpretation can be found on the scanned worksheet.       name: ANTONY  Language: [id number]  Agency:  [facility]   Type of interpretation:  TELEPHONE    REBEKAH Noble  2:55 PM  10/14/2022

## 2022-11-09 ENCOUNTER — TELEPHONE (OUTPATIENT)
Dept: FAMILY MEDICINE | Facility: CLINIC | Age: 27
End: 2022-11-09

## 2022-11-09 NOTE — TELEPHONE ENCOUNTER
Social Work Note:    Data and Intervention: Received info from  that baby's insurance is active with carlos. They have bills that need to be taken care of. Called [facility] billing ([phone]) and spoke with representative who informed that current owing balance for Chrystal is $4897.97. The vast majority of these are bills for baby. Representative was unable to see are info, will need to pass this on to someone who knows more about insurance, but this will be processed and pulled out of patient responsibilities.     Family can ignore bills they have already received.   Should watch for mail in a few months for any future bills for baby.   Will still owe balance from copays for mom, which was about $6.00.     Called  with [place]  to inform and had 32 minute conversation. He was happy with this update but had questions regarding birth certificate and paper from hospital with name of baby and parents and request to send money. Over course of conversation it was determined that this is certificate of parentage. This needs to be signed and mailed back to [place] [employer] with payment $26. Dad prefers to go in person to pay for this. He can go to the [facility], [address] [place] [id number].     Assessment and Plan: No action needed on bills. Follow up during next apt regarding certificate of parentage and birth certificate.    RASHEEDA Dsouza

## 2022-12-29 NOTE — PROGRESS NOTES
Assessment & Plan  26 year old  at 27w6d with KVNG Sep 2022 based on LMP  23 week US    Chrystal was seen today for prenatal care.    Diagnoses and all orders for this visit:    Prenatal care in second trimester  -     CBC with platelets  -     Gestational glucose tolerance testing (GTT 1 hr challenge); Future  -     Cancel: Gestational glucose tolerance testing (GTT 1 hr challenge)  -     Glucose tolerance gest screen 1 hour; Future        Weight gain adequate: 8.346 kg (18 lb 6.4 oz) to date, out of recommended total of 25-35 lbs (pregravid BMI 18.5-24.9)  .    Return to clinic in 2 weeks.    Yasmin Dhaliwal MD  I precepted today with Alvarado Wei MD.    Diagnosis or treatment significantly limited by social determinants of health - Refugee, non-English speaking, limited transportation  Ordering of each unique test  Prescription drug management  30 minutes spent on the date of the encounter doing chart review, history and exam, documentation and further activities per the note        Subjective  Concerns: none    ROS:  No - Headache  No - Changes in vision  No - Chest Pain  No - Shortness of Breath  No - Nausea   No - Vomiting  No - Abdominal pain   No - Contractions  No - Dysuria   No - Vaginal Discharge    No - Vaginal bleeding   No - Loss of Fluid   No - Extremity swelling   Present - Fetal movement       Going to WIC? Yes      Patient Active Problem List   Diagnosis     Supervision of high risk pregnancy, antepartum     Language barrier, cultural differences     Encounter for health examination of refugee     History of stillbirth     H/O postpartum hemorrhage, currently pregnant     Kidney stone     Hydroureteronephrosis       Chrystal Justice speaks [place]; Lithuanian so an  was used today.    Guidance:  seatbelt use  fetal growth and movement  signs of  labor    Do you need help getting a car seat? YES  Do you need help getting a breast pump? No      Objective  BP 99/63    Pulse 96   Temp 98  F (36.7  C) (Oral)   Resp 20   Ht 1.527 m (5' 0.1\")   Wt 56.9 kg (125 lb 6.4 oz)   LMP 01/20/2022 (Approximate)   SpO2 98%   BMI 24.41 kg/m    No distress.  Gravid abdomen.  .  Fundal height 28.5 cm.  no edema.    Results  Blood type: AB POS  Results for orders placed or performed in visit on 07/01/22   CBC with platelets     Status: Abnormal   Result Value Ref Range    WBC Count 9.7 4.0 - 11.0 10e3/uL    RBC Count 3.56 (L) 3.80 - 5.20 10e6/uL    Hemoglobin 11.2 (L) 11.7 - 15.7 g/dL    Hematocrit 32.9 (L) 35.0 - 47.0 %    MCV 92 78 - 100 fL    MCH 31.5 26.5 - 33.0 pg    MCHC 34.0 31.5 - 36.5 g/dL    RDW 12.7 10.0 - 15.0 %    Platelet Count 224 150 - 450 10e3/uL       "

## 2023-02-13 ENCOUNTER — TELEPHONE (OUTPATIENT)
Dept: FAMILY MEDICINE | Facility: CLINIC | Age: 28
End: 2023-02-13
Payer: COMMERCIAL

## 2023-02-13 NOTE — TELEPHONE ENCOUNTER
2/13/2023: Care Coordination       CC: The patients  called the clinic this morning requesting information about what services I provide her client as well transportation for an upcoming in person  appointment  Here at the clinic on: 2/15/2023 @ 8:00 am. [employer] will pick the patient and her son up between: 7:15 am - 7:45am. When ready to return home, the patient will need to activate the will call service by calling [employer] at:[phone]. Ride details have been given to her .      Darrion Berkowitz Sr.  Social Work  Care Coordination  [address] [address]  [email or web address]   Office: [phone]  Direct: [phone]  [facility]

## 2023-02-15 ENCOUNTER — OFFICE VISIT (OUTPATIENT)
Dept: FAMILY MEDICINE | Facility: CLINIC | Age: 28
End: 2023-02-15
Payer: COMMERCIAL

## 2023-02-15 VITALS
HEART RATE: 73 BPM | BODY MASS INDEX: 24.86 KG/M2 | SYSTOLIC BLOOD PRESSURE: 105 MMHG | TEMPERATURE: 97.9 F | RESPIRATION RATE: 16 BRPM | WEIGHT: 129.4 LBS | DIASTOLIC BLOOD PRESSURE: 71 MMHG | OXYGEN SATURATION: 99 %

## 2023-02-15 DIAGNOSIS — J30.89 NON-SEASONAL ALLERGIC RHINITIS, UNSPECIFIED TRIGGER: ICD-10-CM

## 2023-02-15 DIAGNOSIS — H54.7 DECREASED VISUAL ACUITY: Primary | ICD-10-CM

## 2023-02-15 DIAGNOSIS — H10.13 ALLERGIC CONJUNCTIVITIS, BILATERAL: ICD-10-CM

## 2023-02-15 PROCEDURE — 99214 OFFICE O/P EST MOD 30 MIN: CPT | Mod: 25 | Performed by: STUDENT IN AN ORGANIZED HEALTH CARE EDUCATION/TRAINING PROGRAM

## 2023-02-15 PROCEDURE — 90471 IMMUNIZATION ADMIN: CPT | Performed by: STUDENT IN AN ORGANIZED HEALTH CARE EDUCATION/TRAINING PROGRAM

## 2023-02-15 PROCEDURE — 90686 IIV4 VACC NO PRSV 0.5 ML IM: CPT | Performed by: STUDENT IN AN ORGANIZED HEALTH CARE EDUCATION/TRAINING PROGRAM

## 2023-02-15 RX ORDER — OLOPATADINE HYDROCHLORIDE 1 MG/ML
1 SOLUTION/ DROPS OPHTHALMIC 2 TIMES DAILY
COMMUNITY
End: 2023-11-15

## 2023-02-15 RX ORDER — CETIRIZINE HYDROCHLORIDE 10 MG/1
10 TABLET ORAL DAILY
Qty: 90 TABLET | Refills: 0 | Status: SHIPPED | OUTPATIENT
Start: 2023-02-15 | End: 2023-11-15

## 2023-02-15 RX ORDER — FLUTICASONE PROPIONATE 50 MCG
1 SPRAY, SUSPENSION (ML) NASAL DAILY
Qty: 18.2 ML | Refills: 1 | Status: SHIPPED | OUTPATIENT
Start: 2023-02-15 | End: 2023-11-15

## 2023-02-15 NOTE — NURSING NOTE
Due to patient being non-English speaking/uses sign language, an  was used for this visit. Only for face-to-face interpretation by an external agency, date and length of interpretation can be found on the scanned worksheet.     name: Rosenda  Agency: [employer]  Language: Ade/jaime   Telephone number: [phone]  Type of interpretation: Telephone, spoken

## 2023-02-15 NOTE — Clinical Note
Hi Era!  Could you please call this pt's  to help set up an eye appointment for decreased visual acuity with [facility]?  She also needs a ride.   Thanks so much!

## 2023-02-15 NOTE — PROGRESS NOTES
Assessment & Plan   Encounter Date: Feb 15, 2023    Decreased visual acuity  In-office visual acuity exam revealed that patient could not see any of the objects on the vision chart.  Her  reports progressive decrease in visual acuity especially over the past several months.  Eye itchiness and vision concerns started 4 to 5 years ago in [place].  She has no pain with extraocular movements and no pain on palpation of the orbit.  No halos or floaters.  No curtain like vision loss.  She has a history of an unspecified blood infection in [place]. Uveitis or retinitis are also on the differential.  I think she should see an eye specialist for a full exam in the next 1-2 weeks given the severity of her visual impairment.  I do think that she has an allergic component to her symptoms given the fact that they bother her more in the morning and midday, so I still recommend treating with allergy medicine.  If symptoms persist, could pursue further autoimmune workup, noman given pt's history of multiple miscarriages.    - Adult Eye  Referral; Future      Allergic conjunctivitis, bilateral  - cetirizine (ZYRTEC) 10 MG tablet; Take 1 tablet (10 mg) by mouth daily  - dextran 70-hypromellose (TEARS NATURALE FREE PF) 0.1-0.3 % ophthalmic solution; Place 1 drop into both eyes daily as needed (dry eyes)      Non-seasonal allergic rhinitis, unspecified trigger  Swollen turbinates.  Morning eye symptoms.  Suspect possible allergy to dust mites or mold.  - cetirizine (ZYRTEC) 10 MG tablet; Take 1 tablet (10 mg) by mouth daily  - fluticasone (FLONASE) 50 MCG/ACT nasal spray; Spray 1 spray into both nostrils daily      Route chart to referral coordinator given language barrier. Pt also needs a ride.     Follow-Up:   1 month to assess allergy jonas Reece MD PGY-3   [facility]   Staffed with Dr. Valencia.        ______________________________________________________    Subjective  "      CC: other (Itchy eyes (vison is blurry and really itchy for the past few) [place] and would like a referral )    HPI:  Ms. Chrystal Justice is a(n) 27 year old female who presents today as a return patient for:    Itchy eyes:  4-5 years of symptoms, worse in the morning.  No particular time of year are symptoms worse.  When her eyes are itchy, she vision feels blurry/ \"less.\" No congestion.  No sneezing.  She also has itching on her body, especially hands and arms.  No history of asthma.  She has not tried any medication.  Her  accompanies her and he is concerned that her vision has gotten worse to the point that she \"can't see very far at all.\"  She saw a doctor in [facility] and had glasses there, but not since coming to the [place]. She has a history of a blood infection that led to a miscarriage.           Preventive Care:  Due for flu vaccine  Pap  COVID  Yearly check-up    Patient is otherwise feeling well, without additional concerns.    A remote phone [employer]  was used for this visit.       Medications:  Current Outpatient Medications   Medication     acetaminophen (TYLENOL) 325 MG tablet     acetaminophen (TYLENOL) 500 MG tablet     famotidine (PEPCID) 40 MG tablet     hydrocortisone (CORTAID) 1 % external cream     ibuprofen (ADVIL/MOTRIN) 600 MG tablet     loratadine (CLARITIN) 10 MG tablet     Prenatal Vit-Fe Fumarate-FA (PRENATAL MULTIVITAMIN W/IRON) 27-0.8 MG tablet     No current facility-administered medications for this visit.      Past Medical History:   Patient Active Problem List   Diagnosis     Language barrier, cultural differences     Encounter for health examination of refugee     Kidney stone     S/P      Infection due to 2019 novel coronavirus     Anemia due to blood loss, acute     Past Medical History:   Diagnosis Date     Anemia due to blood loss, acute 2022     Infection due to 2019 novel coronavirus 2022 "     Kidney stone               Objective     /71 (BP Location: Left arm, Patient Position: Sitting, Cuff Size: Adult Regular)   Pulse 73   Temp 97.9  F (36.6  C) (Oral)   Resp 16   Wt 58.7 kg (129 lb 6.4 oz)   SpO2 99%   BMI 24.86 kg/m      Pertinent Physical Exam Findings    Gen:  Appears stated age.  Casually groomed.   HEENT:    Eyes: EOMI.  Watery and slighly red.  No pain on palpation.  No pain with EOM. Pupils symmetric.  Failed visual acuity screen.   Ears:  Ear canals clear.  Whitish discoloration of TM.  No effusion.   Nose:  L inferior turbinate is mildly swollen. No drainage.   Neck: Thyroid is normal to palpation without nodules.   Throat:  1+ tonsil bilaterally without exudate.  Uvula is midline and not enlarged.  CV:  RRR. No murmur  Resp:  No wheezing.   SKIN:No rashes on lower arms or hands bilaterally.        Admission on 09/23/2022, Discharged on 09/26/2022   Component Date Value Ref Range Status     Treponema Antibody Total 09/23/2022 Nonreactive  Nonreactive Final     ABO/RH(D) 09/23/2022 AB POS   Final     Antibody Screen 09/23/2022 Negative  Negative Final     SPECIMEN EXPIRATION DATE 09/23/2022 [id number]   Final     SARS CoV2 PCR 09/23/2022 Positive (A)  Negative Final    POSITIVE: SARS-CoV-2 (COVID-19) RNA detected, presumed positive.     WBC Count 09/23/2022 9.0  4.0 - 11.0 10e3/uL Final     RBC Count 09/23/2022 3.75 (L)  3.80 - 5.20 10e6/uL Final     Hemoglobin 09/23/2022 11.2 (L)  11.7 - 15.7 g/dL Final     Hematocrit 09/23/2022 34.4 (L)  35.0 - 47.0 % Final     MCV 09/23/2022 92  78 - 100 fL Final     MCH 09/23/2022 29.9  26.5 - 33.0 pg Final     MCHC 09/23/2022 32.6  31.5 - 36.5 g/dL Final     RDW 09/23/2022 12.7  10.0 - 15.0 % Final     Platelet Count 09/23/2022 241  150 - 450 10e3/uL Final     Hemoglobin 09/25/2022 9.4 (L)  11.7 - 15.7 g/dL Final                ----- Service Performed and Documented by Resident or Fellow ------

## 2023-02-15 NOTE — PATIENT INSTRUCTIONS
Take cetirizine 10 mg everyday for allergies    Use nasal spray 1 spray in each nose everyday    Apply 1-2 drops of eye drops to each eye as needed

## 2023-02-24 ENCOUNTER — TELEPHONE (OUTPATIENT)
Dept: FAMILY MEDICINE | Facility: CLINIC | Age: 28
End: 2023-02-24
Payer: COMMERCIAL

## 2023-02-24 NOTE — TELEPHONE ENCOUNTER
03/01/23- Eye appt @ [facility] with [facility]. Roundtrip setup with [employer], [phone].  time: 9am-915am going to [address] [place] [address] [place] [address] with a will call for return ride home.    Era Bernard

## 2023-03-01 ENCOUNTER — OFFICE VISIT (OUTPATIENT)
Dept: OPHTHALMOLOGY | Facility: CLINIC | Age: 28
End: 2023-03-01
Attending: STUDENT IN AN ORGANIZED HEALTH CARE EDUCATION/TRAINING PROGRAM
Payer: COMMERCIAL

## 2023-03-01 ENCOUNTER — TELEPHONE (OUTPATIENT)
Dept: OPHTHALMOLOGY | Facility: CLINIC | Age: 28
End: 2023-03-01

## 2023-03-01 DIAGNOSIS — H18.603 KERATOCONUS OF BOTH EYES: ICD-10-CM

## 2023-03-01 DIAGNOSIS — H52.203 MYOPIA OF BOTH EYES WITH ASTIGMATISM: ICD-10-CM

## 2023-03-01 DIAGNOSIS — H54.7 DECREASED VISUAL ACUITY: ICD-10-CM

## 2023-03-01 DIAGNOSIS — H52.13 MYOPIA OF BOTH EYES WITH ASTIGMATISM: ICD-10-CM

## 2023-03-01 DIAGNOSIS — Z01.01 ENCOUNTER FOR EXAMINATION OF EYES AND VISION WITH ABNORMAL FINDINGS: Primary | ICD-10-CM

## 2023-03-01 PROCEDURE — 92015 DETERMINE REFRACTIVE STATE: CPT | Performed by: OPHTHALMOLOGY

## 2023-03-01 PROCEDURE — 92004 COMPRE OPH EXAM NEW PT 1/>: CPT | Performed by: OPHTHALMOLOGY

## 2023-03-01 RX ORDER — FLUTICASONE PROPIONATE 50 MCG
1 SPRAY, SUSPENSION (ML) NASAL DAILY
COMMUNITY
End: 2023-11-15

## 2023-03-01 ASSESSMENT — CONF VISUAL FIELD
OS_INFERIOR_NASAL_RESTRICTION: 0
OD_NORMAL: 1
OD_SUPERIOR_TEMPORAL_RESTRICTION: 0
OD_INFERIOR_NASAL_RESTRICTION: 0
OD_SUPERIOR_NASAL_RESTRICTION: 0
OS_NORMAL: 1
METHOD: COUNTING FINGERS
OS_SUPERIOR_NASAL_RESTRICTION: 0
OS_INFERIOR_TEMPORAL_RESTRICTION: 0
OS_SUPERIOR_TEMPORAL_RESTRICTION: 0
OD_INFERIOR_TEMPORAL_RESTRICTION: 0

## 2023-03-01 ASSESSMENT — REFRACTION_MANIFEST
OD_SPHERE: -9.25
OS_AXIS: 006
OD_AXIS: 164
OS_SPHERE: -6.50
OD_CYLINDER: +8.00
OS_SPHERE: -6.00
METHOD_AUTOREFRACTION: 1
OD_CYLINDER: +10.00
OS_AXIS: 005
OD_AXIS: 164
OS_CYLINDER: +8.00
OD_SPHERE: -9.25
OS_CYLINDER: +8.50

## 2023-03-01 ASSESSMENT — SLIT LAMP EXAM - LIDS
COMMENTS: NORMAL
COMMENTS: NORMAL

## 2023-03-01 ASSESSMENT — VISUAL ACUITY
OS_SC: 20/500
METHOD: SNELLEN - LINEAR

## 2023-03-01 ASSESSMENT — EXTERNAL EXAM - LEFT EYE: OS_EXAM: NORMAL

## 2023-03-01 ASSESSMENT — TONOMETRY
OD_IOP_MMHG: 07
IOP_METHOD: ICARE
OS_IOP_MMHG: 09

## 2023-03-01 ASSESSMENT — CUP TO DISC RATIO
OS_RATIO: 0.3
OD_RATIO: 0.3

## 2023-03-01 ASSESSMENT — EXTERNAL EXAM - RIGHT EYE: OD_EXAM: NORMAL

## 2023-03-01 NOTE — LETTER
"    3/1/2023         RE: Chrystal Justice  [address]  [place] [address]        Dear Colleague,    Thank you for referring your patient, Chrystal Justice, to the [facility]. Please see a copy of my visit note below.     Current Eye Medications:  None     Subjective: Complete eye exam. Vision has been decreased in distance for long time over 1.5 years ago. Can read OK when up close. Having some itching and redness both eyes. No eye pain in either eye.    With  and 5 mo old child.  Exam via [employer]  on phone.     Objective:  See Ophthalmology Exam.       Assessment:  Baseline eye exam in patient with high myopia/high astigmatism.  Possible keratoconus in patient with hx of ocular allergy.      ICD-10-CM    1. Encounter for examination of eyes and vision with abnormal findings  Z01.01 EYE EXAM (SIMPLE-NONBILLABLE)     REFRACTION      2. Myopia of both eyes with astigmatism  H52.13 REFRACTION    H52.203 Adult Eye  Referral      3. Keratoconus of both eyes  H18.603 EYE EXAM (SIMPLE-NONBILLABLE)     Adult Eye  Referral      4. Decreased visual acuity  H54.7 Adult Eye  Referral     Adult Eye  Referral            Plan:  Glasses prescription given - advised to hold this until after visit with cornea specialist  May use artificial tears up to four times a day (like Refresh Optive, Systane Balance, TheraTears, or generic artificial tears are ok. Avoid \"get the red out\" drops).      Referral to cornea specialist at the [facility].  Call in November 2023 for an appointment in March 2024 for Complete Exam    Dr. Neff [phone]            Again, thank you for allowing me to participate in the care of your patient.        Sincerely,        Bhaskar Neff MD    "

## 2023-03-01 NOTE — TELEPHONE ENCOUNTER
Scheduled in open new time slot with Dr. Villanueva for tomorrow at 8 AM    Note to patient communicator to reach out to confirm appt.    Pete Moore RN 3:04 PM 03/01/23               [facility] Call Center    Phone Message    May a detailed message be left on voicemail: yes     Reason for Call: Appointment Intake    Referring Provider Name: Bhaskar Neff MD in  OPHTHALMOLOGY  Diagnosis and/or Symptoms:   Cornea eval: poss keratoconus? high astigmatism and myopia; both eyes, Decreased visual acuity [H54.7]  Myopia of both eyes with astigmatism , Cornea eval: poss keratoconus? high astigmatism and myopia; both eyes,    Please schedule with a cornea specialist at the [facility] and contact care coordinator    I didn't have anything in the timeframe requested to offer the pt.    This is an urgent referral for pt to be seen with in 3-5 days  ,   Action Taken: Message routed to:  [facility]    Travel Screening: Not Applicable

## 2023-03-01 NOTE — PATIENT INSTRUCTIONS
"Glasses prescription given - advised to hold this until after visit with cornea specialist  May use artificial tears up to four times a day (like Refresh Optive, Systane Balance, TheraTears, or generic artificial tears are ok. Avoid \"get the red out\" drops).      Referral to cornea specialist at the [facility].  Call in November 2023 for an appointment in March 2024 for Complete Exam    Dr. Neff [phone]     ? ???? ???? ????? ???  ?? ??? ?? ?? ????? ??? ???? ?????? ????? ?????? (??? ?????? ?????? ????? ??????? TheraTears? ?? ????? ?????? ????? ??? ??. ? \"red out\" ????? ??? ??? ????).  ? [facility] ?? ? ?????? ????? ?? ???? ???.  ? 2023 ?? ????? ?? ? ????? ??????? ????? ?? ???? 2024 ?? ? ?????? ????? ??? ????    ????? ??????? [phone]  "

## 2023-03-01 NOTE — TELEPHONE ENCOUNTER
Called the pt the first time and he picked up - he needed interp     Called with interp and he did not      I left a message in reagrds to his appointment with Dr. Villanueva at 8 am and provided the clinic address     Corinna Booth Communication Facilitator on 3/1/2023 at 3:27 PM

## 2023-03-01 NOTE — PROGRESS NOTES
" Current Eye Medications:  None     Subjective: Complete eye exam. Vision has been decreased in distance for long time over 1.5 years ago. Can read OK when up close. Having some itching and redness both eyes. No eye pain in either eye.    With  and 5 mo old child.  Exam via [employer]  on phone.     Objective:  See Ophthalmology Exam.       Assessment:  Baseline eye exam in patient with high myopia/high astigmatism.  Possible keratoconus in patient with hx of ocular allergy.      ICD-10-CM    1. Encounter for examination of eyes and vision with abnormal findings  Z01.01 EYE EXAM (SIMPLE-NONBILLABLE)     REFRACTION      2. Myopia of both eyes with astigmatism  H52.13 REFRACTION    H52.203 Adult Eye  Referral      3. Keratoconus of both eyes  H18.603 EYE EXAM (SIMPLE-NONBILLABLE)     Adult Eye  Referral      4. Decreased visual acuity  H54.7 Adult Eye  Referral     Adult Eye  Referral            Plan:  Glasses prescription given - advised to hold this until after visit with cornea specialist  May use artificial tears up to four times a day (like Refresh Optive, Systane Balance, TheraTears, or generic artificial tears are ok. Avoid \"get the red out\" drops).      Referral to [facility]nea specialist at the [facility].  Call in November 2023 for an appointment in March 2024 for Complete Exam    Dr. Neff [phone]        "

## 2023-03-02 PROBLEM — H52.203 MYOPIA OF BOTH EYES WITH ASTIGMATISM: Chronic | Status: ACTIVE | Noted: 2023-03-02

## 2023-03-02 PROBLEM — H52.13 MYOPIA OF BOTH EYES WITH ASTIGMATISM: Chronic | Status: ACTIVE | Noted: 2023-03-02

## 2023-04-10 ENCOUNTER — TELEPHONE (OUTPATIENT)
Dept: OPHTHALMOLOGY | Facility: CLINIC | Age: 28
End: 2023-04-10
Payer: COMMERCIAL

## 2023-04-10 NOTE — TELEPHONE ENCOUNTER
I attempted x 2 with interp to contact this pt.      hung up on us x 2      PLEASE SCHEDULE WITH DR. COOPER NEXT AVAILABLE = NOT URGENT - PT NO SHOWED 3/2    Corinna Booth Communication Facilitator on 4/10/2023 at 10:31 AM

## 2023-04-10 NOTE — TELEPHONE ENCOUNTER
[facility] Call Center    Phone Message    May a detailed message be left on voicemail: yes     Reason for Call: Other: Patient called to get rescheduled for her urgent referral. Please call to advise. See previous TE.      Action Taken: Other: eye    Travel Screening: Not Applicable

## 2023-05-04 ENCOUNTER — OFFICE VISIT (OUTPATIENT)
Dept: OPHTHALMOLOGY | Facility: CLINIC | Age: 28
End: 2023-05-04
Attending: OPHTHALMOLOGY
Payer: COMMERCIAL

## 2023-05-04 DIAGNOSIS — H54.7 DECREASED VISUAL ACUITY: ICD-10-CM

## 2023-05-04 DIAGNOSIS — H18.463 PELLUCID MARGINAL DEGENERATION OF BOTH CORNEAS: ICD-10-CM

## 2023-05-04 DIAGNOSIS — H52.213 IRREGULAR ASTIGMATISM OF BOTH EYES: Primary | ICD-10-CM

## 2023-05-04 DIAGNOSIS — H52.13 MYOPIA OF BOTH EYES WITH ASTIGMATISM: ICD-10-CM

## 2023-05-04 DIAGNOSIS — H52.203 MYOPIA OF BOTH EYES WITH ASTIGMATISM: ICD-10-CM

## 2023-05-04 PROCEDURE — G0463 HOSPITAL OUTPT CLINIC VISIT: HCPCS | Mod: 25 | Performed by: OPHTHALMOLOGY

## 2023-05-04 PROCEDURE — 92025 CPTRIZED CORNEAL TOPOGRAPHY: CPT | Performed by: OPHTHALMOLOGY

## 2023-05-04 PROCEDURE — 76514 ECHO EXAM OF EYE THICKNESS: CPT | Performed by: OPHTHALMOLOGY

## 2023-05-04 PROCEDURE — 99214 OFFICE O/P EST MOD 30 MIN: CPT | Mod: GC | Performed by: OPHTHALMOLOGY

## 2023-05-04 ASSESSMENT — CONF VISUAL FIELD
OS_INFERIOR_NASAL_RESTRICTION: 0
OD_SUPERIOR_NASAL_RESTRICTION: 0
OS_SUPERIOR_TEMPORAL_RESTRICTION: 0
OS_NORMAL: 1
OS_SUPERIOR_NASAL_RESTRICTION: 0
OD_INFERIOR_TEMPORAL_RESTRICTION: 0
OD_NORMAL: 1
OS_INFERIOR_TEMPORAL_RESTRICTION: 0
OD_INFERIOR_NASAL_RESTRICTION: 0
METHOD: COUNTING FINGERS
OD_SUPERIOR_TEMPORAL_RESTRICTION: 0

## 2023-05-04 ASSESSMENT — VISUAL ACUITY
OS_SC: 20/400
OS_PH_SC: 20/60
OD_PH_SC+: +1
OD_PH_SC: 20/80
OS_PH_SC+: -2
OD_SC: 20/500
METHOD: NUMBERS - LINEAR

## 2023-05-04 ASSESSMENT — TONOMETRY
OD_IOP_MMHG: 8
OS_IOP_MMHG: 5
IOP_METHOD: ICARE

## 2023-05-04 ASSESSMENT — SLIT LAMP EXAM - LIDS
COMMENTS: NORMAL
COMMENTS: NORMAL

## 2023-05-04 ASSESSMENT — EXTERNAL EXAM - RIGHT EYE: OD_EXAM: NORMAL

## 2023-05-04 ASSESSMENT — PACHYMETRY
OS_CT(UM): 528
OD_CT(UM): 489

## 2023-05-04 ASSESSMENT — EXTERNAL EXAM - LEFT EYE: OS_EXAM: NORMAL

## 2023-05-04 NOTE — NURSING NOTE
"Chief Complaints and History of Present Illnesses   Patient presents with     Consult For     Referral from Dr. Bhaskar Neff for evaluation of Keratoconus each eye.     Remember The Member  via phone/iPad; ID# [id number].   Per patient's , \"She says she wants her eyes fixed without any glasses. She would like to try the treatment first, and if it doesn't work then ok to try glasses. She was given only 1 eye drop to use. She's only used it once.\"     Catia Jackson, MADDY 8:11 AM 05/04/2023       Chief Complaint(s) and History of Present Illness(es)     Consult For            Laterality: both eyes    Onset: years ago    Quality: blurred vision    Associated symptoms: tearing (sometimes in the AM) and itching.  Negative for eye pain and redness    Treatments tried: artificial tears    Pain scale: 0/10    Comments: Referral from Dr. Bhaskar Neff for evaluation of Keratoconus each eye.     Remember The Member  via phone/iPad; ID# [id number].   Per patient's , \"She says she wants her eyes fixed without any glasses. She would like to try the treatment first, and if it doesn't work then ok to try glasses. She was given only 1 eye drop to use. She's only used it once.\"     Catia Jackson, MADDY 8:11 AM 05/04/2023                  "

## 2023-05-04 NOTE — PROGRESS NOTES
Chief complaint     Chief Complaints and History of Present Illnesses   Patient presents with     Consult For     Referral from Dr. Bhaskar Neff for evaluation of Keratoconus each eye.          Referring Provider: Dr. Neff     HPI    Chrystal Justice 27 year old female who presents for initial corneal evaluation at [facility]. Patient was seen with Dr. Neff in 3/2023 and noted to have high myopia and significant astigmatism in both eyes and a history of ocular allergies, prompting concern for possible keratoconus. Patient reports that her vision has been blurry for the past 5-6 years. She does not wear corrective lenses, though she previously had glasses that she wore that she felt helped her vision. She notes that her eyes frequently itch and she finds herself often rubbing them. When seen with Dr. Neff, it was recommended that she start using artificial tears. These give some relief to her drive to rub her eyes, but this does not fully resolve the itching.     Past ocular history   Prior eye surgery/laser/Trauma: none  CTL wearer:no  Glasses : prior  Family Hx of eye disease:none    PMH     Past Medical History:   Diagnosis Date     Anemia due to blood loss, acute 2022     Infection due to 2019 novel coronavirus 2022     Kidney stone        PSH     Past Surgical History:   Procedure Laterality Date      SECTION N/A 2022    Procedure:  SECTION;  Surgeon: Daily Mo MD;  Location: [facility]     Current Outpatient Medications   Medication     acetaminophen (TYLENOL) 325 MG tablet     acetaminophen (TYLENOL) 500 MG tablet     cetirizine (ZYRTEC) 10 MG tablet     dextran 70-hypromellose (TEARS NATURALE FREE PF) 0.1-0.3 % ophthalmic solution     famotidine (PEPCID) 40 MG tablet     fluticasone (FLONASE) 50 MCG/ACT nasal spray     fluticasone (FLONASE) 50 MCG/ACT nasal spray     hydrocortisone (CORTAID) 1 % external cream     ibuprofen (ADVIL/MOTRIN) 600  MG tablet     loratadine (CLARITIN) 10 MG tablet     olopatadine (PATANOL) 0.1 % ophthalmic solution     Prenatal Vit-Fe Fumarate-FA (PRENATAL MULTIVITAMIN W/IRON) 27-0.8 MG tablet     No current facility-administered medications for this visit.       Labs   -    Imaging   -    Drops Currently Taking   Artificial tears PRN each eye     Assessment/Plan   # Pellucid marginal degeneration, both eyes  - Baseline topography today consistent with PMD each eye  - Significant astigmatism (regular centrally, though still high) would likely require contact lenses for clarity of vision  - Will refer to Dr. Baltazar for contact lens fitting  - Start Pataday or Zaditor for itching  every day each eye  - Continue frequent preservative free artificial tears each eye     Follow up:  6 months for cornea w/ V,T, at next visit, call if problems sooner to clinic.  - See Dr Baltazar for CL trail    ALSO SEES PROVIDERS:  Dr. Constance Dalal MD  Fellow, Cornea, External Disease and Refractive Surgery  Department of Ophthalmology  [facility]    Attending Physician Attestation:  Complete documentation of historical and exam elements from today's encounter can be found in the full encounter summary report (not reduplicated in this progress note).  I personally obtained the chief complaint(s) and history of present illness.  I confirmed and edited as necessary the review of systems, past medical/surgical history, family history, social history, and examination findings as documented by others; and I examined the patient myself.  I personally reviewed the relevant tests, images, and reports as documented above.  I formulated and edited as necessary the assessment and plan and discussed the findings and management plan with the patient and family. - Young Villanueva MD

## 2023-05-04 NOTE — LETTER
2023       RE: Chrystal Justice  [address]  [place] [address]     Dear Colleague,    Thank you for referring your patient, Chrystal Justice, to the [facility] [place] at [facility]. Please see a copy of my visit note below.    Chief complaint     Chief Complaints and History of Present Illnesses   Patient presents with    Consult For     Referral from Dr. Bhaskar Neff for evaluation of Keratoconus each eye.          Referring Provider: Dr. Neff     HPI    Chrystal Justice 27 year old female who presents for initial corneal evaluation at [facility]. Patient was seen with Dr. Neff in 3/2023 and noted to have high myopia and significant astigmatism in both eyes and a history of ocular allergies, prompting concern for possible keratoconus. Patient reports that her vision has been blurry for the past 5-6 years. She does not wear corrective lenses, though she previously had glasses that she wore that she felt helped her vision. She notes that her eyes frequently itch and she finds herself often rubbing them. When seen with Dr. Neff, it was recommended that she start using artificial tears. These give some relief to her drive to rub her eyes, but this does not fully resolve the itching.     Past ocular history   Prior eye surgery/laser/Trauma: none  CTL wearer:no  Glasses : prior  Family Hx of eye disease:none    PMH     Past Medical History:   Diagnosis Date    Anemia due to blood loss, acute 2022    Infection due to 2019 novel coronavirus 2022    Kidney stone        PSH     Past Surgical History:   Procedure Laterality Date     SECTION N/A 2022    Procedure:  SECTION;  Surgeon: Daily Mo MD;  Location: [facility]     Current Outpatient Medications   Medication    acetaminophen (TYLENOL) 325 MG tablet    acetaminophen (TYLENOL) 500 MG tablet    cetirizine (ZYRTEC) 10  MG tablet    dextran 70-hypromellose (TEARS NATURALE FREE PF) 0.1-0.3 % ophthalmic solution    famotidine (PEPCID) 40 MG tablet    fluticasone (FLONASE) 50 MCG/ACT nasal spray    fluticasone (FLONASE) 50 MCG/ACT nasal spray    hydrocortisone (CORTAID) 1 % external cream    ibuprofen (ADVIL/MOTRIN) 600 MG tablet    loratadine (CLARITIN) 10 MG tablet    olopatadine (PATANOL) 0.1 % ophthalmic solution    Prenatal Vit-Fe Fumarate-FA (PRENATAL MULTIVITAMIN W/IRON) 27-0.8 MG tablet     No current facility-administered medications for this visit.       Labs   -    Imaging   -    Drops Currently Taking   Artificial tears PRN each eye     Assessment/Plan   # Pellucid marginal degeneration, both eyes  - Baseline topography today consistent with PMD each eye  - Significant astigmatism (regular centrally, though still high) would likely require contact lenses for clarity of vision  - Will refer to Dr. Baltazar for contact lens fitting  - Start Pataday or Zaditor for itching  every day each eye  - Continue frequent preservative free artificial tears each eye     Follow up:  6 months for cornea w/ V,T, at next visit, call if problems sooner to clinic.  - See Dr Baltazar for CL trail    ALSO SEES PROVIDERS:  Dr. Nolasco    Again, thank you for allowing me to participate in the care of your patient.      Sincerely,    Young Villanueva MD

## 2023-05-18 ENCOUNTER — OFFICE VISIT (OUTPATIENT)
Dept: FAMILY MEDICINE | Facility: CLINIC | Age: 28
End: 2023-05-18
Payer: COMMERCIAL

## 2023-05-18 VITALS
WEIGHT: 126.4 LBS | BODY MASS INDEX: 24.28 KG/M2 | RESPIRATION RATE: 14 BRPM | SYSTOLIC BLOOD PRESSURE: 97 MMHG | TEMPERATURE: 98 F | DIASTOLIC BLOOD PRESSURE: 60 MMHG | HEART RATE: 75 BPM | OXYGEN SATURATION: 99 %

## 2023-05-18 DIAGNOSIS — L30.1 ECZEMA, DYSHIDROTIC: Primary | ICD-10-CM

## 2023-05-18 PROCEDURE — 99213 OFFICE O/P EST LOW 20 MIN: CPT | Mod: GC | Performed by: STUDENT IN AN ORGANIZED HEALTH CARE EDUCATION/TRAINING PROGRAM

## 2023-05-18 RX ORDER — BETAMETHASONE DIPROPIONATE 0.05 %
OINTMENT (GRAM) TOPICAL 2 TIMES DAILY
Qty: 50 G | Refills: 3 | Status: SHIPPED | OUTPATIENT
Start: 2023-05-18 | End: 2023-11-15

## 2023-05-18 NOTE — PATIENT INSTRUCTIONS
Do not wash your hands as often  Use the steroid cream twice a day for 2-4 weeks at least. Do it until rash improves, then continue for 2-3 more days but at least for 2 weeks.

## 2023-05-18 NOTE — PROGRESS NOTES
"Assessment & Plan   Eczema, dyshidrotic  History and pictures of rash appear like dyshidrotic eczema, would be unusual spot for contact dermatitis. Will manage as below with derm referral per patient request.   - betamethasone dipropionate (DIPROSONE) 0.05 % external ointment  Dispense: 50 g; Refill: 3  - Adult Dermatology Referral    Prescription drug management     BMI:   Estimated body mass index is 24.28 kg/m  as calculated from the following:    Height as of 22: 1.537 m (5' 0.5\").    Weight as of this encounter: 57.3 kg (126 lb 6.4 oz).     Patient Instructions   Do not wash your hands as often  Use the steroid cream twice a day for 2-4 weeks at least. Do it until rash improves, then continue for 2-3 more days but at least for 2 weeks.       Follow Up:  No follow-ups on file.    Options for treatment and follow-up care were reviewed with the patient who was engaged and actively involved in the decision making process, verbalized understanding of the options discussed, and satisfied with the final plan.    Patient was staffed with supervising physician, Dr. Pete Espinoza, who agrees with the findings and plan.     Marquis Benson DO, PGY-3  [facility] Medicine      Subjective   Chrystal Justice is a 27 year old year old female who presents for the following health issues  accompanied by her spouse and child  Past Medical History:   Diagnosis Date     Anemia due to blood loss, acute 2022     Infection due to 2019 novel coronavirus 2022     Kidney stone      Patient Active Problem List   Diagnosis     Language barrier, cultural differences     Encounter for health examination of refugee     Kidney stone     S/P      Infection due to 2019 novel coronavirus     Anemia due to blood loss, acute     Keratoconus of both eyes     Myopia of both eyes with astigmatism     Chief Complaint   Patient presents with     Derm Problem     On both hands- she was here last year for the same thing. The cream " helped some but completely   It itches and bleeds        Had similar rash last year  Prescribed topical cream with good relief.   Previously has it on her hands and with itchiness up her arms   Never burning    No chance of pregnancy  LMP: Currently on her period     Review of Systems:   Constitutional, HEENT, cardiovascular, pulmonary, GI, , musculoskeletal, neuro, skin, endocrine and psych systems are negative, except as otherwise noted.     Objective     BP 97/60 (BP Location: Right arm, Patient Position: Sitting, Cuff Size: Adult Regular)   Pulse 75   Temp 98  F (36.7  C) (Oral)   Resp 14   Wt 57.3 kg (126 lb 6.4 oz)   LMP 05/18/2023 (Exact Date)   SpO2 99%   Breastfeeding Yes   BMI 24.28 kg/m    Body mass index is 24.28 kg/m .    Physical Exam  Cardiovascular:      Rate and Rhythm: Normal rate and regular rhythm.      Pulses: Normal pulses.      Heart sounds: Normal heart sounds. No murmur heard.  Skin:     Comments: Xerosis with scattered hyperkeratotic spots and vesicles of back of hands                        ----- Service Performed and Documented by Resident or Fellow ------

## 2023-06-21 ENCOUNTER — OFFICE VISIT (OUTPATIENT)
Dept: OPTOMETRY | Facility: CLINIC | Age: 28
End: 2023-06-21
Payer: COMMERCIAL

## 2023-06-21 DIAGNOSIS — H52.213 IRREGULAR ASTIGMATISM OF BOTH EYES: ICD-10-CM

## 2023-06-21 DIAGNOSIS — H18.463 PELLUCID MARGINAL DEGENERATION OF BOTH CORNEAS: Primary | ICD-10-CM

## 2023-06-21 ASSESSMENT — REFRACTION_CURRENTRX
OD_BRAND: ZENLENS RC
OS_BRAND: ZENLENS RC
OD_BASECURVE: 4.3/7.58
OD_SPHERE: -2.00
OD_DIAMETER: 15.4
OS_DIAMETER: 15.4
OS_BASECURVE: 4.6/7.34
OS_SPHERE: -2.00

## 2023-06-21 ASSESSMENT — REFRACTION_WEARINGRX
OS_SPHERE: -6.00
OS_AXIS: 005
OD_AXIS: 164
OD_SPHERE: -9.25
OS_CYLINDER: +8.00
OD_CYLINDER: +8.00

## 2023-06-21 ASSESSMENT — EXTERNAL EXAM - LEFT EYE: OS_EXAM: NORMAL

## 2023-06-21 ASSESSMENT — SLIT LAMP EXAM - LIDS
COMMENTS: NORMAL
COMMENTS: NORMAL

## 2023-06-21 ASSESSMENT — VISUAL ACUITY
OD_SC: 20/400
METHOD: SNELLEN - LINEAR
OS_SC: 20/500

## 2023-06-21 ASSESSMENT — EXTERNAL EXAM - RIGHT EYE: OD_EXAM: NORMAL

## 2023-06-21 NOTE — PROGRESS NOTES
A/P  1.) Pellucid Marginal Degeneration with Irregular Astigmatism OU  -High irregular cyl on reynaldo (7D OU). Failed glasses 2' to high cyl  -No previous CL wear.  reports they have been struggling with her vision for a long time  -BCVA 20/30 range with scleral lens OR today, would like to further refine on Rx lenses  -Decent initial fit. Reviewed findings with pt - they would like to proceed. Reviewed daily wear only and fitting process    Order lenses, RTC 2-3 weeks for lens dispense, I&R    Exam via [employer]  today    I have confirmed the patient's CC, HPI and reviewed Past Medical History, Past Surgical History, Social History, Family History, Problem List, Medication List and agree with Tech note.     Vanda Baltazar, OD [facility]

## 2023-07-14 ENCOUNTER — OFFICE VISIT (OUTPATIENT)
Dept: OPHTHALMOLOGY | Facility: CLINIC | Age: 28
End: 2023-07-14
Payer: COMMERCIAL

## 2023-07-14 DIAGNOSIS — H18.463 PELLUCID MARGINAL DEGENERATION OF BOTH CORNEAS: Primary | ICD-10-CM

## 2023-07-14 DIAGNOSIS — H52.213 IRREGULAR ASTIGMATISM OF BOTH EYES: ICD-10-CM

## 2023-07-14 PROCEDURE — 99213 OFFICE O/P EST LOW 20 MIN: CPT | Performed by: OPTOMETRIST

## 2023-07-14 ASSESSMENT — VISUAL ACUITY
OD_CC: 20/25
OS_CC: 20/25
CORRECTION_TYPE: CONTACTS
METHOD: SNELLEN - NUMBERS

## 2023-07-14 ASSESSMENT — REFRACTION_CURRENTRX
OS_BASECURVE: 4.6/7.58
OS_SPHERE: PLANO
OD_DIAMETER: 15.4
OD_BASECURVE: 4.6/7.58
OS_ADDL_SPECS: BOSTON XO BLUE, HYDRAPEG
OD_SPHERE: PLANO
OD_BRAND: ZENLENS RC
OS_BRAND: ZENLENS RC
OS_DIAMETER: 15.4

## 2023-07-14 ASSESSMENT — EXTERNAL EXAM - LEFT EYE: OS_EXAM: NORMAL

## 2023-07-14 ASSESSMENT — EXTERNAL EXAM - RIGHT EYE: OD_EXAM: NORMAL

## 2023-07-14 ASSESSMENT — SLIT LAMP EXAM - LIDS
COMMENTS: NORMAL
COMMENTS: NORMAL

## 2023-07-14 NOTE — PROGRESS NOTES
A/P  1.) Pellucid Marginal Degeneration with Irregular Astigmatism OU  -High irregular cyl on reynaldo (7D OU). Failed glasses 2' to high cyl  -No previous CL wear.  reports they have been struggling with her vision for a long time  -BCVA 20/25 right eye and left eye today with Rx scleral lens. Largely good fit overall  -Unsuccessful I&R - pt unable to insert without a bubble. Reviewed option for continuing practice today vs returning for tech I&R.   -Some communication difficulty via      RTC n/a for further tech I&R. Hold lenses at [facility] for next tech practice.     Exam via [employer]  today    I have confirmed the patient's CC, HPI and reviewed Past Medical History, Past Surgical History, Social History, Family History, Problem List, Medication List and agree with Tech note.     Vanda Baltazar, OD [facility]

## 2023-07-28 ENCOUNTER — OFFICE VISIT (OUTPATIENT)
Dept: OPTOMETRY | Facility: CLINIC | Age: 28
End: 2023-07-28
Payer: COMMERCIAL

## 2023-07-28 ENCOUNTER — ALLIED HEALTH/NURSE VISIT (OUTPATIENT)
Dept: OPHTHALMOLOGY | Facility: CLINIC | Age: 28
End: 2023-07-28
Payer: COMMERCIAL

## 2023-07-28 DIAGNOSIS — H52.203 MYOPIA OF BOTH EYES WITH ASTIGMATISM: Primary | ICD-10-CM

## 2023-07-28 DIAGNOSIS — H18.463 PELLUCID MARGINAL DEGENERATION OF BOTH CORNEAS: Primary | ICD-10-CM

## 2023-07-28 DIAGNOSIS — H52.13 MYOPIA OF BOTH EYES WITH ASTIGMATISM: Primary | ICD-10-CM

## 2023-07-28 DIAGNOSIS — H52.213 IRREGULAR ASTIGMATISM OF BOTH EYES: ICD-10-CM

## 2023-07-28 PROCEDURE — 99207 PR NO CHARGE LOS: CPT

## 2023-07-28 ASSESSMENT — REFRACTION_CURRENTRX
OS_BRAND: ZENLENS RC
OS_DIAMETER: 15.4
OD_DIAMETER: 15.4
OD_BASECURVE: 4.6/7.58
OD_SPHERE: PLANO
OS_SPHERE: PLANO
OS_BASECURVE: 4.6/7.58
OD_BRAND: ZENLENS RC
OS_ADDL_SPECS: BOSTON XO BLUE, HYDRAPEG

## 2023-07-28 NOTE — PROGRESS NOTES
No office visit. CL order only. Lenses were dispensed at [facility] visit.    Contact Lens Billing  V-Code:  - GP scleral  Final Contact Lens Rx         Brand Base Curve Diameter Sphere Lens Addl. Specs    Right Zenlens RC 4.6/7.58 15.4 [place] 4 flat H x 1 steep V [place] XO clear, HydraPEG    Left Zenlens RC 4.6/7.58 15.4 [place] 4 flat H x 1 steep V [place] XO blue, HydraPEG           # of units: 2  Price per Unit: $225    This patient requires contact lenses that are medically necessary for either improvement in vision over spectacles, support of the ocular surface, or other therapeutic benefit. These are not cosmetic contact lenses.     Encounter Diagnoses   Name Primary?    Pellucid marginal degeneration of both corneas Yes    Irregular astigmatism of both eyes         Date of last eye exam: 7/14/23

## 2023-08-17 ENCOUNTER — OFFICE VISIT (OUTPATIENT)
Dept: OPHTHALMOLOGY | Facility: CLINIC | Age: 28
End: 2023-08-17
Payer: COMMERCIAL

## 2023-08-17 DIAGNOSIS — H52.13 MYOPIA OF BOTH EYES WITH ASTIGMATISM: ICD-10-CM

## 2023-08-17 DIAGNOSIS — H52.203 MYOPIA OF BOTH EYES WITH ASTIGMATISM: ICD-10-CM

## 2023-08-17 DIAGNOSIS — H18.463 PELLUCID MARGINAL DEGENERATION OF BOTH CORNEAS: Primary | ICD-10-CM

## 2023-08-17 DIAGNOSIS — H52.213 IRREGULAR ASTIGMATISM OF BOTH EYES: ICD-10-CM

## 2023-08-17 PROCEDURE — 99213 OFFICE O/P EST LOW 20 MIN: CPT | Performed by: OPTOMETRIST

## 2023-08-17 RX ORDER — SODIUM CHLORIDE FOR INHALATION 0.9 %
3 VIAL, NEBULIZER (ML) INHALATION 2 TIMES DAILY
Qty: 300 ML | Refills: 4 | Status: SHIPPED | OUTPATIENT
Start: 2023-08-17

## 2023-08-17 ASSESSMENT — REFRACTION_CURRENTRX
OD_SPHERE: PLANO
OD_BASECURVE: 4.6/7.58
OS_BASECURVE: 4.6/7.58
OS_DIAMETER: 15.4
OS_BRAND: ZENLENS RC
OD_DIAMETER: 15.4
OS_ADDL_SPECS: BOSTON XO BLUE, HYDRAPEG
OD_BRAND: ZENLENS RC
OS_SPHERE: PLANO

## 2023-08-17 ASSESSMENT — VISUAL ACUITY
METHOD: SNELLEN - NUMBERS
CORRECTION_TYPE: CONTACTS
OD_CC: 20/20
OS_CC: 20/20

## 2023-08-17 ASSESSMENT — EXTERNAL EXAM - RIGHT EYE: OD_EXAM: NORMAL

## 2023-08-17 ASSESSMENT — REFRACTION_WEARINGRX
OD_SPHERE: -9.25
OS_SPHERE: -6.00
OD_AXIS: 164
OD_CYLINDER: +8.00
OS_AXIS: 005
OS_CYLINDER: +8.00

## 2023-08-17 ASSESSMENT — EXTERNAL EXAM - LEFT EYE: OS_EXAM: NORMAL

## 2023-08-17 ASSESSMENT — SLIT LAMP EXAM - LIDS
COMMENTS: NORMAL
COMMENTS: NORMAL

## 2023-08-17 NOTE — NURSING NOTE
Chief Complaints and History of Present Illnesses   Patient presents with    Contact Lens Follow Up     Scleral lens follow up check     Chief Complaint(s) and History of Present Illness(es)       Contact Lens Follow Up              Laterality: both eyes    Comments: Scleral lens follow up check              Comments    Patient reports that she is unsure which lens in left and right and wants to confirm which is which. Practiced a few time but then stopped wearing due to losing track of which lens for which eye . Brought them with today however. States she would like to get glasses Rx as it has been hard to use contacts with having children but would like to have contacts too.   Contacts were working well when had tried them both comfort and clarity before possibly mixing them up or confusing which lens was which.   Put CL in today at exam.  Was able to get right eye in with the first attempt. Left eye was more trouble took more attempts. Was rubbing each eye a lot following CL insertion. Discourage this but ended up with loss of fluid left eye/ Need to re-insert. Practiced I and R more today.   Hanna Payne, COT COT 9:18 AM August 17, 2023

## 2023-08-17 NOTE — PROGRESS NOTES
A/P  1.) Pellucid Marginal Degeneration with Irregular Astigmatism OU  -High irregular cyl on reynaldo (7D OU). Failed glasses 2' to high cyl. Will trial cut Rx per pt request - reviewed adaptation and that this will NOT give as good of acuity as contact lenses  -BCVA 20/25+ right eye and left eye today with Rx scleral lens. Largely good fit overall  -She has been minimally practicing at home as she does thought she mixed up lenses. Reviewed lenses are same left and right currently  -She self-inserted today with bubbles OU. Reviewed how to tell if there is a bubble and not to wear with a bubble in. Very strong Bell's reflex. Rec further practice  -Excellent vision/fit with current lenses. No changes recommended    Here with family member who is helping interpret today. Option for glasses wear (if tolerated) or contact lens wear as desired. Would strongly recommend further practice with CL's on I&R. Can follow-up here prn with CL concerns.     I have confirmed the patient's CC, HPI and reviewed Past Medical History, Past Surgical History, Social History, Family History, Problem List, Medication List and agree with Tech note.     Vanda Baltazar, OD [facility]OHANNYS

## 2023-10-09 ENCOUNTER — APPOINTMENT (OUTPATIENT)
Dept: OPTOMETRY | Facility: CLINIC | Age: 28
End: 2023-10-09
Payer: COMMERCIAL

## 2023-10-09 PROCEDURE — 92340 FIT SPECTACLES MONOFOCAL: CPT | Performed by: OPTOMETRIST

## 2023-11-13 ENCOUNTER — OFFICE VISIT (OUTPATIENT)
Dept: FAMILY MEDICINE | Facility: CLINIC | Age: 28
End: 2023-11-13
Payer: COMMERCIAL

## 2023-11-13 VITALS
SYSTOLIC BLOOD PRESSURE: 108 MMHG | TEMPERATURE: 98.6 F | OXYGEN SATURATION: 97 % | HEART RATE: 86 BPM | BODY MASS INDEX: 24.93 KG/M2 | WEIGHT: 129.8 LBS | RESPIRATION RATE: 22 BRPM | DIASTOLIC BLOOD PRESSURE: 64 MMHG

## 2023-11-13 DIAGNOSIS — N92.6 MISSED PERIOD: ICD-10-CM

## 2023-11-13 DIAGNOSIS — Z32.01 PREGNANCY TEST POSITIVE: Primary | ICD-10-CM

## 2023-11-13 DIAGNOSIS — M54.50 ACUTE RIGHT-SIDED LOW BACK PAIN, UNSPECIFIED WHETHER SCIATICA PRESENT: ICD-10-CM

## 2023-11-13 LAB — HCG UR QL: POSITIVE

## 2023-11-13 PROCEDURE — 90471 IMMUNIZATION ADMIN: CPT

## 2023-11-13 PROCEDURE — 99213 OFFICE O/P EST LOW 20 MIN: CPT | Mod: 25

## 2023-11-13 PROCEDURE — 81025 URINE PREGNANCY TEST: CPT

## 2023-11-13 PROCEDURE — 90686 IIV4 VACC NO PRSV 0.5 ML IM: CPT

## 2023-11-13 RX ORDER — PRENATAL VIT/IRON FUM/FOLIC AC 27MG-0.8MG
1 TABLET ORAL DAILY
Qty: 90 TABLET | Refills: 3 | Status: ON HOLD | OUTPATIENT
Start: 2023-11-13 | End: 2024-03-14

## 2023-11-13 RX ORDER — ACETAMINOPHEN 325 MG/1
325-650 TABLET ORAL EVERY 6 HOURS PRN
Qty: 180 TABLET | Refills: 1 | Status: ON HOLD | OUTPATIENT
Start: 2023-11-13 | End: 2024-03-14

## 2023-11-13 NOTE — PROGRESS NOTES
Assessment & Plan     #Pregnancy test positive  UPT reviewed and was positive. This is a wanted pregnancy.  Not sure LMP. Has not started prenatal vitamins.  Reports minimal nausea.  Medications reviewed.  Based off exam and history likely late first trimester or early second trimester.  - HCG qualitative urine; Future  - Prenatal Vit-Fe Fumarate-FA (PRENATAL MULTIVITAMIN W/IRON) 27-0.8 MG tablet; Take 1 tablet by mouth daily  Dispense: 90 tablet; Refill: 3  - INFLUENZA VACCINE >6 MONTHS (AFLURIA/FLUZONE)  - US OB <14 WKS SINGLE OR FIRST GESTATION; Future    #Acute right-sided low back pain, unspecified whether sciatica present  Patient reports mild to moderate back pain with no red flags, no urinary symptoms.  Pain is positional with tenderness around paraspinal muscles of right side lower back, likely MSK.  - acetaminophen (TYLENOL) 325 MG tablet; Take 1-2 tablets (325-650 mg) by mouth every 6 hours as needed for mild pain  Dispense: 180 tablet; Refill: 1  -reviewed OTC topical creams  -Avoid NSAIDs  -Return sooner if symptoms worsen or do not improve            Malka Deleon MD  [facility]    Anny Jarrell is a 28 year old, presenting for the following health issues:  Pain (Back pain and upt)    HPI     Patient presenting for UPT. Wanted pregnancy. LMP was a few months, not sure. No symptoms, initially had nausea and vomiting though no more. Now with some back pain. No fevers. No dysuria or increased frequency.     Review of Systems   As above      Objective    /64   Pulse 86   Temp 98.6  F (37  C) (Oral)   Resp 22   Wt 58.9 kg (129 lb 12.8 oz)   SpO2 97%   BMI 24.93 kg/m    Body mass index is 24.93 kg/m .  Physical Exam   GENERAL: healthy, alert and no distress  RESP: lungs clear to auscultation - no rales, rhonchi or wheezes  CV: regular rate and rhythm, normal S1 S2, no S3 or S4, no murmur, click or rub, no peripheral edema and peripheral pulses strong  ABDOMEN:  fundus firm and nontender approx just above umbilicus   MS: no gross musculoskeletal defects noted, no edema  PSYCH: mentation appears normal, affect normal/bright  MSK: Tenderness around paraspinal muscles of R side low back. No decreased ROM or spinal tenderness.

## 2023-11-20 ENCOUNTER — ANCILLARY PROCEDURE (OUTPATIENT)
Dept: ULTRASOUND IMAGING | Facility: CLINIC | Age: 28
End: 2023-11-20
Attending: FAMILY MEDICINE
Payer: COMMERCIAL

## 2023-11-20 DIAGNOSIS — Z32.01 PREGNANCY TEST POSITIVE: ICD-10-CM

## 2023-11-20 PROCEDURE — 76805 OB US >/= 14 WKS SNGL FETUS: CPT | Mod: TC | Performed by: RADIOLOGY

## 2023-11-20 NOTE — NURSING NOTE
Due to patient being non-English speaking/uses sign language, an  was used for this visit. Only for face-to-face interpretation by an external agency, date and length of interpretation can be found on the scanned worksheet.     name: #[id number]  Agency:  [employer]  Language:  Mojgan    Telephone number: [phone]  Type of interpretation: Telephone, spoken

## 2023-11-22 NOTE — RESULT ENCOUNTER NOTE
Called the patient with a [facility]  ([facility] [id number]) and relayed the providers message.    Lola Gloria MA

## 2023-11-27 ENCOUNTER — TELEPHONE (OUTPATIENT)
Dept: FAMILY MEDICINE | Facility: CLINIC | Age: 28
End: 2023-11-27

## 2023-11-29 ENCOUNTER — ALLIED HEALTH/NURSE VISIT (OUTPATIENT)
Dept: FAMILY MEDICINE | Facility: CLINIC | Age: 28
End: 2023-11-29
Payer: COMMERCIAL

## 2023-11-29 ENCOUNTER — OFFICE VISIT (OUTPATIENT)
Dept: FAMILY MEDICINE | Facility: CLINIC | Age: 28
End: 2023-11-29
Payer: COMMERCIAL

## 2023-11-29 VITALS
OXYGEN SATURATION: 97 % | WEIGHT: 132.8 LBS | DIASTOLIC BLOOD PRESSURE: 64 MMHG | BODY MASS INDEX: 25.51 KG/M2 | HEART RATE: 97 BPM | SYSTOLIC BLOOD PRESSURE: 94 MMHG | TEMPERATURE: 97.9 F | RESPIRATION RATE: 20 BRPM

## 2023-11-29 DIAGNOSIS — Z87.59 HISTORY OF STILLBIRTH: ICD-10-CM

## 2023-11-29 DIAGNOSIS — O09.90 SUPERVISION OF HIGH RISK PREGNANCY, ANTEPARTUM: ICD-10-CM

## 2023-11-29 DIAGNOSIS — Z84.3 FAMILY HISTORY OF CONSANGUINITY: ICD-10-CM

## 2023-11-29 DIAGNOSIS — Z60.3 LANGUAGE BARRIER, CULTURAL DIFFERENCES: ICD-10-CM

## 2023-11-29 DIAGNOSIS — O09.32 LATE PRENATAL CARE AFFECTING PREGNANCY IN SECOND TRIMESTER: ICD-10-CM

## 2023-11-29 DIAGNOSIS — O09.30 LATE PRENATAL CARE AFFECTING PREGNANCY, ANTEPARTUM: ICD-10-CM

## 2023-11-29 DIAGNOSIS — O09.299 H/O POSTPARTUM HEMORRHAGE, CURRENTLY PREGNANT: ICD-10-CM

## 2023-11-29 DIAGNOSIS — B96.89 BACTERIAL VAGINOSIS IN PREGNANCY: ICD-10-CM

## 2023-11-29 DIAGNOSIS — O09.899 SHORT INTERVAL BETWEEN PREGNANCIES AFFECTING PREGNANCY, ANTEPARTUM: ICD-10-CM

## 2023-11-29 DIAGNOSIS — N20.0 KIDNEY STONE: ICD-10-CM

## 2023-11-29 DIAGNOSIS — Z98.891 HISTORY OF CESAREAN SECTION, LOW TRANSVERSE: ICD-10-CM

## 2023-11-29 DIAGNOSIS — O09.30 LATE PRENATAL CARE: ICD-10-CM

## 2023-11-29 DIAGNOSIS — Z98.891 HISTORY OF C-SECTION: Primary | ICD-10-CM

## 2023-11-29 DIAGNOSIS — O09.32 SUPERVISION OF HIGH-RISK PREGNANCY WITH INSUFFICIENT PRENATAL CARE IN SECOND TRIMESTER: Primary | ICD-10-CM

## 2023-11-29 DIAGNOSIS — O23.599 BACTERIAL VAGINOSIS IN PREGNANCY: ICD-10-CM

## 2023-11-29 LAB
ABO/RH(D): NORMAL
ALBUMIN UR-MCNC: NEGATIVE MG/DL
ANTIBODY SCREEN: NEGATIVE
APPEARANCE UR: CLEAR
BILIRUB UR QL STRIP: NEGATIVE
CLUE CELLS: PRESENT
COLOR UR AUTO: YELLOW
ERYTHROCYTE [DISTWIDTH] IN BLOOD BY AUTOMATED COUNT: 12.6 % (ref 10–15)
GLUCOSE UR STRIP-MCNC: NEGATIVE MG/DL
HCT VFR BLD AUTO: 32.7 % (ref 35–47)
HGB BLD-MCNC: 10.7 G/DL (ref 11.7–15.7)
HGB UR QL STRIP: NEGATIVE
KETONES UR STRIP-MCNC: NEGATIVE MG/DL
LEUKOCYTE ESTERASE UR QL STRIP: NEGATIVE
MCH RBC QN AUTO: 30.3 PG (ref 26.5–33)
MCHC RBC AUTO-ENTMCNC: 32.7 G/DL (ref 31.5–36.5)
MCV RBC AUTO: 93 FL (ref 78–100)
NITRATE UR QL: NEGATIVE
PH UR STRIP: 6.5 [PH] (ref 5–8)
PLATELET # BLD AUTO: 219 10E3/UL (ref 150–450)
RBC # BLD AUTO: 3.53 10E6/UL (ref 3.8–5.2)
SP GR UR STRIP: 1.01 (ref 1–1.03)
SPECIMEN EXPIRATION DATE: NORMAL
SPECIMEN EXPIRATION DATE: NORMAL
TRICHOMONAS, WET PREP: ABNORMAL
UROBILINOGEN UR STRIP-ACNC: 0.2 E.U./DL
WBC # BLD AUTO: 9.6 10E3/UL (ref 4–11)
WBC'S/HIGH POWER FIELD, WET PREP: ABNORMAL
YEAST, WET PREP: ABNORMAL

## 2023-11-29 PROCEDURE — 87591 N.GONORRHOEAE DNA AMP PROB: CPT

## 2023-11-29 PROCEDURE — 83655 ASSAY OF LEAD: CPT | Mod: 90

## 2023-11-29 PROCEDURE — 99000 SPECIMEN HANDLING OFFICE-LAB: CPT

## 2023-11-29 PROCEDURE — 81003 URINALYSIS AUTO W/O SCOPE: CPT

## 2023-11-29 PROCEDURE — 86900 BLOOD TYPING SEROLOGIC ABO: CPT

## 2023-11-29 PROCEDURE — 86901 BLOOD TYPING SEROLOGIC RH(D): CPT

## 2023-11-29 PROCEDURE — 99207 PR FIRST OB VISIT: CPT | Mod: GC

## 2023-11-29 PROCEDURE — 87340 HEPATITIS B SURFACE AG IA: CPT

## 2023-11-29 PROCEDURE — 86780 TREPONEMA PALLIDUM: CPT

## 2023-11-29 PROCEDURE — 87491 CHLMYD TRACH DNA AMP PROBE: CPT

## 2023-11-29 PROCEDURE — H1002 CARECOORDINATION PRENATAL: HCPCS

## 2023-11-29 PROCEDURE — 87210 SMEAR WET MOUNT SALINE/INK: CPT

## 2023-11-29 PROCEDURE — 86787 VARICELLA-ZOSTER ANTIBODY: CPT

## 2023-11-29 PROCEDURE — 85027 COMPLETE CBC AUTOMATED: CPT

## 2023-11-29 PROCEDURE — H1001 ANTEPARTUM MANAGEMENT: HCPCS

## 2023-11-29 PROCEDURE — 36415 COLL VENOUS BLD VENIPUNCTURE: CPT

## 2023-11-29 PROCEDURE — 86850 RBC ANTIBODY SCREEN: CPT

## 2023-11-29 PROCEDURE — 99207 PR NO BILLABLE SERVICE THIS VISIT: CPT

## 2023-11-29 PROCEDURE — 87389 HIV-1 AG W/HIV-1&-2 AB AG IA: CPT

## 2023-11-29 PROCEDURE — 86803 HEPATITIS C AB TEST: CPT

## 2023-11-29 PROCEDURE — G0145 SCR C/V CYTO,THINLAYER,RESCR: HCPCS

## 2023-11-29 PROCEDURE — 87086 URINE CULTURE/COLONY COUNT: CPT

## 2023-11-29 PROCEDURE — 86762 RUBELLA ANTIBODY: CPT

## 2023-11-29 RX ORDER — METRONIDAZOLE 7.5 MG/G
1 GEL VAGINAL DAILY
Qty: 35 G | Refills: 0 | Status: SHIPPED | OUTPATIENT
Start: 2023-11-29 | End: 2023-12-06

## 2023-11-29 SDOH — SOCIAL STABILITY - SOCIAL INSECURITY: ACCULTURATION DIFFICULTY: Z60.3

## 2023-11-29 NOTE — PROGRESS NOTES
Past Medical History     Do you have a history of any of the following medical conditions?    Condition No/Yes/Details   Hypertension No    Heart disease, mitral valve prolapse, rheumatic fever No    Asthma or another chronic lung disease No    An autoimmune disorder No    Kidney disease  YES h/o kidney stones   Frequent urinary tract infections No    Epilepsy, seizures, or spells No    Migraine headaches No    Stroke, loss of sensation/function, seizures, or other neuro problem No    Diabetes No    Thyroid problems or have you taken thyroid medication No    Hepatitis, liver disease, jaundice No    Blood clots, phlebitis, pulmonary embolism or varicose veins No    Excessive bleeding after surgery or dental work No    Do you have more bleeding than other women after a cut or a scratch? No    Anemia No    Blood transfusions  YES had blood transfusion in         Would you refuse a blood transfusion?       No   If yes, then ask next question. If no, skip next question.   Would you rather die than receive a blood transfusion? No    Breast problems No    Have you ever ?  YES plans to breast feed   Abnormalities of the uterus No    Abnormal pap smear No    Have you ever been treated for depression? No    Are you having problems with crying spells or loss of self-esteem? No    Have you ever required psychiatric care? No    Have you been physically, sexually, or emotionally hurt by someone? No    Have you been in a major accident or suffered serious trauma? No    Have you ever had any gynecological surgical procedures such as cervical conization, LEEP, laser treatment, cryosurgery of the cervix, or a dilatation and curettage?  YES     Have you had any other surgical procedures? No         Have you ever had any complications from anesthesia? No    Have you ever been hospitalized for a nonsurgical reason? No             Substance use and exposure     Does anyone in your home smoke? No    Do you  use tobacco products or betel nut? No    Do you drink beer, wine, or hard liquor? No    Do you use any of the following: marijuana, speed, cocaine, heroin, hallucinogens, or other drugs? No               Symptoms since Last Menstrual Period     Do you currently have any of the following symptoms: No/Yes/Details        Abdominal pain No         Blood in the stools or urine No         Chest pain No         Shortness of breath No         coughing or vomiting up blood No         Your heart racing or skipping beats No         Nausea or vomiting No         Pain on urination No         Vaginal discharge or bleeding No                Genetic Screening          Is the patient 35 years or older? No      Do you have a history of any of the following No/Yes/Details        A metabolic disorder (e.g. Insulin-dependent DM, PKU) No         Recurrent pregnancy loss or still birth  YES 24 wk fetal demise     Do you, the baby's father, or anyone in your families have          Thalassemia AND MCV <80 No         Hemophilia No         Neural tube defect No }        Congenital heart defect No         Sickle cell disease or trait No         Muscular dystrophy No         Cystic fibrosis No         Mental retardation or autism No         Down's syndrome No         Rajeev-Sach's disease No         [place]'s chorea No         Any other inherited genetic or chromosomal disorder No         A child with birth defects not listed above No                Infection History     Ever treated for tuberculosis or had a positive skin test No    Ever had genital herpes (or has your partner) No    Had a rash or viral illness since LMP No    Ever had a sexually transmitted infection No    Ever had chicken pox or the vaccine No    Have you had a sexual partner who is HIV positive No    Ever had any other serious infectious disease No                Risk Assessment     Average Risk Category  No significant risk factors: Yes    At Risk Category (up to  3)  Teen pregnancy: No  Poor social situation: No  Domestic abuse: No  Financial difficulties: No  Smoker: No  H/O  deliver: No  H/O drug abuse: No  Non-English speaking: Yes  Advanced maternal age: No  GDM risks: No  Previous C/S: Yes  H/O PIH: No  H/O STIs: No  H/O mental health concerns: No  Onset care > 20 weeks: Yes  Other: h/o 24 wk fetal demise    High Risk Category (4 or more At Risk or)  Diabetes/GDM: No  Multiple gestation: No  Chronic hypertension: No  Significant hx of asthma: No  Fetal demise > 20 weeks: No  Positive tox screen: No  Current mental health treatment: No      Risk: High Risk   Date determined: 23

## 2023-11-29 NOTE — LETTER
December 8, 2023      Chrystal Justice  [address]  [place] [address]        Dear ,    We are writing to inform you of your test results.    All your results were normal except for one.     You tested positive for bacterial vaginosis. I sent a prescription for you to take twice a day for one week.     Resulted Orders   GYN Cytology   Result Value Ref Range    Interpretation        Negative for Intraepithelial Lesion or Malignancy (NILM)    Comment         Papanicolaou Test Limitations:  Cervical cytology is a screening test with limited sensitivity, and regular screening is critical for cancer prevention.  Pap tests are primarily effective for the diagnosis/prevention of squamous cell carcinoma, not adenocarcinoma or other cancers.        Specimen Adequacy       Satisfactory for evaluation, endocervical/transformation zone component absent    Clinical Information       pregnancy      LMP/Menopause Date       5/18/2023      Reflex Testing No     Previous Abnormal?       No      Performing Labs       The technical component of this testing was completed at [facility]     Wet Prep   Result Value Ref Range    Trichomonas Absent Absent    Yeast Absent Absent    Clue Cells Present (A) Absent    WBCs/high power field 2+ (A) None    Narrative    <20% clue cell; Few bacteria; negative odor   Hepatitis C Screen Reflex to HCV RNA Quant and Genotype   Result Value Ref Range    Hepatitis C Antibody Nonreactive Nonreactive    Narrative    Assay performance characteristics have not been established for newborns, infants, and children.   Chlamydia trachomatis/Neisseria gonorrhoeae by PCR - Clinic Collect   Result Value Ref Range    Chlamydia Trachomatis Negative Negative      Comment:      Negative for C. trachomatis rRNA by transcription mediated amplification.   A negative result by transcription mediated amplification does not preclude the  presence of infection because results are dependent on proper and adequate collection, absence of inhibitors and sufficient rRNA to be detected.    Neisseria gonorrhoeae Negative Negative      Comment:      Negative for N. gonorrhoeae rRNA by transcription mediated amplification. A negative result by transcription mediated amplification does not preclude the presence of C. trachomatis infection because results are dependent on proper and adequate collection, absence of inhibitors and sufficient rRNA to be detected.   Varicella Zoster Virus Antibody IgG   Result Value Ref Range    VZV Yadira IgG Instrument Value 954.2 <135.0 Index    Varicella Zoster Antibody IgG Positive       Comment:      Suggests previous exposure or immunization and probable immunity.   UA reflex to Microscopic   Result Value Ref Range    Color Urine Yellow Colorless, Straw, Light Yellow, Yellow    Appearance Urine Clear Clear    Glucose Urine Negative Negative mg/dL    Bilirubin Urine Negative Negative    Ketones Urine Negative Negative mg/dL    Specific Gravity Urine 1.015 1.005 - 1.030    Blood Urine Negative Negative    pH Urine 6.5 5.0 - 8.0    Protein Albumin Urine Negative Negative mg/dL    Urobilinogen Urine 0.2 0.2, 1.0 E.U./dL    Nitrite Urine Negative Negative    Leukocyte Esterase Urine Negative Negative    Narrative    Microscopic not indicated   Culture Urine   Result Value Ref Range    Culture No Growth    Syphilis Screen Cascade   Result Value Ref Range    Treponema Antibody Total Nonreactive Nonreactive   Rubella Antibody IgG   Result Value Ref Range    Rubella Yadira IgG Instrument Value 32.90 <0.90 Index    Rubella Antibody IgG Positive       Comment:      Suggests previous exposure or immunization and probable immunity.   Lead, Blood   Result Value Ref Range    Lead Venous Blood <2.0 <=4.9 ug/dL      Comment:      INTERPRETIVE INFORMATION: Lead, Blood (Venous)    Analysis performed by Inductively Coupled Plasma-Mass   Spectrometry  "(ICP-MS).    Elevated results may be due to skin or collection-related   contamination, including the use of a noncertified   lead-free tube. If contamination concerns exist due to   elevated levels of blood lead, confirmation with a second   specimen collected in a certified lead-free tube is   recommended.    Information sources for blood lead reference intervals and   interpretive comments include the CDC's \"Childhood Lead   Poisoning Prevention: Recommended Actions Based on Blood   Lead Level\" and the \"Adult Blood Lead Epidemiology and   Surveillance: Reference Blood Lead Levels (BLLs) for Adults   in the [place].\" Thresholds and time intervals for retesting,   medical evaluation, and response vary by state and   regulatory body. Contact your State Department of Health   and/or applicable regulatory agency for specific guidance   on medical management  recommendations.    This test was developed and its performance characteristics   determined by [facility]. It has not been cleared or   approved by the U.S. Food and Drug Administration. This   test was performed in a CLIA-certified laboratory and is   intended for clinical purposes.         Group          Concentration   Comment    Children       3.5-19.9 ug/dL  Children under the age of 6                                 years are the most vulnerable                                 to the harmful effects of                                  lead exposure. Environmental                                  investigation and exposure                                  history to identify potential                                 sources of lead. Biological                                  and nutritional monitoring                                 are recommended. Follow-up                                  blood lead monitoring is                                  recommended.                                 20-44.9 ug/dL   Lead hazard reduction and                      "             prompt medical evaluation are                                 recommended. Contact a                                  Pediatric Environmental                                  Health Specialty Unit or                                  poison control center for                                  guidance.                   Greater than    Critical. Immediate medical                  44.9 ug/dL      evaluation, including                                  detailed neurological exam is                                 recommended. Consider                                  chelation therapy when                                 symptoms of lead toxicity   are                                  present. Contact a Pediatric                                  Environmental Health                                  Specialty Unit or poison                                  control center for                                   assistance.    Adult          5-19.9 ug/dL    Medical removal is                                  recommended for pregnant                                  women or those who are trying                                 or may become pregnant.                                  Adverse health effects are                                  possible. Reduced lead                                  exposure and increased blood                                  lead monitoring are                                  recommended.                    20-69.9 ug/dL   Adverse health effects are                                  indicated. Medical removal                                  from lead exposure is                                  required by OSHA if blood                                  lead level exceeds 50 ug/dL.                                 Prompt medical evaluation is                                 recommended.                    Greater than    Critical. Immediate medical                   69.9 ug/dL       evaluation is recommended.                                  Consider chelation therapy                                 when symptoms of lead                                  toxicity are present.  Performed By: [facility]  [address] [place] [address]  : Tank Morales MD, PhD  CLIA Number: [id number]   HIV Ag/Ab Screen Cascade   Result Value Ref Range    HIV Antigen Antibody Combo Nonreactive Nonreactive      Comment:      HIV-1 p24 Ag & HIV-1/HIV-2 Ab Not Detected   Hepatitis B Surface Ag   Result Value Ref Range    Hepatitis B Surface Antigen Nonreactive Nonreactive   CBC with Plt   Result Value Ref Range    WBC Count 9.6 4.0 - 11.0 10e3/uL    RBC Count 3.53 (L) 3.80 - 5.20 10e6/uL    Hemoglobin 10.7 (L) 11.7 - 15.7 g/dL    Hematocrit 32.7 (L) 35.0 - 47.0 %    MCV 93 78 - 100 fL    MCH 30.3 26.5 - 33.0 pg    MCHC 32.7 31.5 - 36.5 g/dL    RDW 12.6 10.0 - 15.0 %    Platelet Count 219 150 - 450 10e3/uL   Antibody Screen   Result Value Ref Range    Antibody Screen Negative Negative    SPECIMEN EXPIRATION DATE [id number]    ABO/Rh Type-HML   Result Value Ref Range    ABO/RH(D) AB POS     SPECIMEN EXPIRATION DATE [id number]        If you have any questions or concerns, please call the clinic at the number listed above.       Sincerely,      Yasmin Dhaliwal MD

## 2023-11-29 NOTE — PROGRESS NOTES
First Obstetric Visit           Assessment and Plan       Chrystal Justice is a 28 year old , at 25w 5d with KVNG of Mar 9, 2024 by  24 week ultrasound.  Patient's history is high risk for the following reasons: PMH of urosepsis leading to stillbirth, one first trimester SAB, and one live birth were baby passed away from fever at 2 days old, s/p  most recent pregnancy, late onset prenatal care, consanguineous marriage (first cousins).       # Other concerns (medical problems, high risk surgical/obstetric history, high risk social situation, etc): PMH of urosepsis leading to stillbirth, one first trimester SAB, and one live birth were baby passed away from fever at 2 days old, s/p  most recent pregnancy, late onset prenatal care, consanguineous marriage (first cousins).  - In addition to routine prenatal care, patient will need genetic assessment with MFM, third trimester ultrasound to evaluate growth.  - Problem list reviewed and updated.    # Additional Pregnancy Risk Assessment: High Risk pregnancy    - Reviewed early screening for gestational diabetes. The patient does have a history of GDM, physically inactive with BMI>23 in [place] />25 in all other populations, physically active with BMI>30, h/o prediabetes/glucose intolerance, first degree relative with GDM or DM, or chronic HTN, so WILL obtain early GCT or A1c.    The patient does not have a history of:   Any one of the following high risk factors:  Pregestational DM1 or DM2  Chronic HTN  Autoimmune dz (Eg SLE, APLS)   H/o Preeclampsia in prior pregnancy  Multifetal gestation  6.   Renal Disease     Consider for two or more of the following moderate risk factors:  Nulliparity or > 10 years since last birth  Obesity (BMI > 30)  H/o preeclampsia in mother or sister  Age ? 35 years  Socio-demographic characteristics (low socioeconomic status, self-identified Black/ race)  Personal Hx of factors (prior SGA/low  birthweight, prior adverse outcome: stillborn)  so WILL NOT start low dose aspirin (81mg) at 12-28 weeks (ideally 12-16 weeks) to prevent preeclampsia.    - The patient  does not have a history of spontaneous  birth so  WILL NOT consider progesterone starting at 16-20 weeks and/or serial transvaginal cervical length ultrasounds from 16-24 weeks.    # General prenatal care management:  - Dating US obtained and dating confirmed?   Yes approx 24 weeks along  - Taking PNV/Folate?     Yes  - Prenatal vitamins ordered.  - Ordered new OB labs: CBC, blood type and antibody screen, HIV, VDRL, hep B sAg, rubella, UA/UC, gonorrhea/chlamydia, Pap smear, Hepatitis B immunity, varicella titer, and Wet prep done today.  - Flu shot offered and was administered at last visit.  - Discussed genetic screening. Patient does want to pursue screening. Discussed risks and benefits of second trimester quad screen fro trisomies, patient declined or was at too advanced a gestational age for testing.   - Discussed screening for sickle cell anemia. Patient does not  want to pursue Hb electrophoresis.     # Counseling given:   - Follow up in 2 weeks for return OB visit for glucose tolerance test.  - Recommended weight gain for pregnancy: 25-35 lbs (pregravid BMI 18.5-24.9)  - Instructed on best evidence for: healthy diet and foods to avoid; exercise and activity during pregnancy; avoiding exposure to toxoplasmosis; safe use of seatbelts during pregnancy; and maintenance of a generally healthy lifestyle  - Patient to see OB educator/ RN today and/or next visit.  - Discussed the harms, benefits, side effects and alternative therapies for current prescribed and OTC medications.     Chrystal was seen today for prenatal care.    Diagnoses and all orders for this visit:    Supervision of high-risk pregnancy with insufficient prenatal care in second trimester  -     ABO/Rh Type-HML; Future  -     Antibody Screen; Future  -     CBC with Plt;  Future  -     Culture Urine; Future  -     Hepatitis B Surface Ag; Future  -     HIV Ag/Ab Screen Cascade; Future  -     Lead, Blood; Future  -     Rubella Antibody IgG; Future  -     Syphilis Screen Cascade; Future  -     GYN Cytology  -     UA reflex to Microscopic; Future  -     Wet Prep  -     Hepatitis C Screen Reflex to HCV RNA Quant and Genotype  -     Chlamydia trachomatis/Neisseria gonorrhoeae by PCR - Clinic Collect  -     Varicella Zoster Virus Antibody IgG  -     UA reflex to Microscopic  -     Culture Urine  -     Syphilis Screen Cascade  -     Rubella Antibody IgG  -     Lead, Blood  -     HIV Ag/Ab Screen Cascade  -     Hepatitis B Surface Ag  -     CBC with Plt  -     Antibody Screen  -     ABO/Rh Type-HML  -     Mat Fetal Med Ctr Referral - Pregnancy; Future    History of  section, low transverse    Late prenatal care    Family history of consanguinity  -     Mat Fetal Med Ctr Referral - Pregnancy; Future    Bacterial vaginosis in pregnancy  -     metroNIDAZOLE (METROGEL) 0.75 % vaginal gel; Place 1 applicator (5 g) vaginally daily for 7 days           There are no Patient Instructions on file for this visit.    Options for treatment and follow-up care were reviewed with the patient and/or guardian. Chrystal Justice and/or guardian engaged in the decision making process and verbalized understanding of the options discussed and agreed with the final plan.    Diagnosis or treatment significantly limited by social determinants of health - immigrant, limited English, limited transport  Ordering of each unique test  Prescription drug management  45 minutes spent by me on the date of the encounter doing chart review, history and exam, documentation and further activities per the note      Yasmin Dhaliwal MD  PGY3 Family Medicine       HPI       Chrystal Justice is a 28 year old woman who presents for an initial prenatal visit at Unknown weeks of pregnancy with KVNG of Mar 9, 2024 by LMP of Patient's  last menstrual period was 2023 (exact date)..      She has not had bleeding since her LMP.   She has not had nausea.   Weight loss has not occurred.    This was not a planned pregnancy.   Desires [employer].    is her first cousin.     OTHER CONCERNS: None              Labor Risk Assessment     Is the patient's age <18 or >40?     No  Patint's BMI is Body mass index is 25.51 kg/m .   Does patient have a BMI < 18.5?     No  Prior delivery within 6 months?      No  Ever delivered prior to 37 weeks gestation?  No  Pregnancy occur via In Vitro Fertilization?   No  Are you carrying twins?       No    The patient has the following additional risk factors for  labor:   Q25: Scant prenatal care - no pre  care prior to 20 weeks or no visit within previous 6 weeks    as documented     Labor Risk Summary:  Pt is high risk for  Labor  No                 Past Medical History     Past Medical History:   Diagnosis Date    Anemia due to blood loss, acute 2022    Infection due to 2019 novel coronavirus 2022    Kidney stone      See nurse history note, reviewed in detail.             Current Medications      Current Outpatient Medications   Medication Sig Dispense Refill    acetaminophen (TYLENOL) 325 MG tablet Take 1-2 tablets (325-650 mg) by mouth every 6 hours as needed for mild pain 180 tablet 1    metroNIDAZOLE (METROGEL) 0.75 % vaginal gel Place 1 applicator (5 g) vaginally daily for 7 days 35 g 0    Prenatal Vit-Fe Fumarate-FA (PRENATAL MULTIVITAMIN W/IRON) 27-0.8 MG tablet Take 1 tablet by mouth daily 90 tablet 3    acetaminophen (TYLENOL) 500 MG tablet Take 1-2 tablets (500-1,000 mg) by mouth every 6 hours as needed for mild pain (Patient not taking: Reported on 2/15/2023) 100 tablet 3    famotidine (PEPCID) 40 MG tablet Take 1 tablet (40 mg) by mouth 2 times daily (Patient not taking: Reported on 2/15/2023) 60 tablet 3    sodium chloride 0.9 % neb solution 3 mLs by Other  route 2 times daily (Patient not taking: Reported on 2023) 300 mL 4             Review of Systems      ROS:  No - Headache  No - Changes in vision  No - Chest Pain  No - Shortness of Breath  No - Nausea   No - Vomiting  No - Abdominal pain   No - Contractions  No - Dysuria   No - Vaginal Discharge    No - Vaginal bleeding   No - Loss of Fluid   No - Extremity swelling     ====================================================           Physical Exam      BP 94/64   Pulse 97   Temp 97.9  F (36.6  C) (Oral)   Resp 20   Wt 60.2 kg (132 lb 12.8 oz)   LMP 2023 (Exact Date)   SpO2 97%   BMI 25.51 kg/m    GENERAL: healthy, alert and no distress  NECK: no tenderness, no adenopathy, no asymmetry, no masses, no stiffness; thyroid- normal to palpation  RESP: lungs clear to auscultation - no rales, no rhonchi, no wheezes  CV: regular rates and rhythm, normal S1 S2, no S3 or S4 and no murmur, no click or rub -  ABDOMEN: soft, no tenderness, no  hepatosplenomegaly, no masses, normal bowel sounds  MS: extremities- no gross deformities noted, no edema  PSYCH: Alert and oriented times 3; coherent speech, normal rate and volume, able to articulate logical thoughts, able to abstract reason, no tangential thoughts, no hallucinations or delusions. Affect is normal.  Well healed scar from  section. FHT 150bpm, uterus measuring 24.5cm  GENITALIA:  BUS WNL, no lesions noted   VAGINA:  Pink, normal rugae and discharge white and curd-like  CERVIX:  smooth, without discharge or CMT and parous os,   firm/ closed 4cm long.  UTERUS: Anteverted and Retroverted, nontender 24 weeks in size.    Past labs reviewed:  AB POS  Varicella immune Yes  Hepatitis B immune Yes  Last pap Never.  She is due for this today.    =========================================

## 2023-11-29 NOTE — PROGRESS NOTES
Preceptor Attestation:    I discussed the patient with the resident and evaluated the patient in person. I have verified the content of the note, which accurately reflects my assessment of the patient and the plan of care.    Redating ultrasound occurring at greater than 22 weeks. Patient will need at least one growth ultrasound.    Patient would likely benefit from genetics consult given consanguinity.    Patient would likely benefit from MFM consult given history of stillbirth at 28 weeks. History of stillbirth in the context of urosepsis with kidney stones rather than anticipated reproducible pathology such as placental pathology, hypercoagulability, or known fetal malformation/genetic abnormality. At this time, will not plan for  fetal monitoring.    Patient desires TOLAC. Last  section was on 2022. KVNG of 2024. Just less than 18 months will have elapsed from surgery, assuming patient goes to KVNG. Pregnancy established more than 6 months from previous  section. Significant concern for risk of rupture with TOLAC, particularly given history of LGA and cephalopelvic disproportion. Recommend early management of expectations with referral to Ob/Gyn for TOLAC counseling and risking.     Supervising Physician:  Keri Ramsay MD

## 2023-11-30 LAB
BACTERIA UR CULT: NO GROWTH
C TRACH DNA SPEC QL PROBE+SIG AMP: NEGATIVE
HBV SURFACE AG SERPL QL IA: NONREACTIVE
HCV AB SERPL QL IA: NONREACTIVE
HIV 1+2 AB+HIV1 P24 AG SERPL QL IA: NONREACTIVE
N GONORRHOEA DNA SPEC QL NAA+PROBE: NEGATIVE
RUBV IGG SERPL QL IA: 32.9 INDEX
RUBV IGG SERPL QL IA: POSITIVE
T PALLIDUM AB SER QL: NONREACTIVE
VZV IGG SER QL IA: 954.2 INDEX
VZV IGG SER QL IA: POSITIVE

## 2023-12-01 DIAGNOSIS — O09.292 HISTORY OF STILLBIRTH IN CURRENTLY PREGNANT PATIENT, SECOND TRIMESTER: Primary | ICD-10-CM

## 2023-12-01 PROBLEM — O09.32 LATE PRENATAL CARE AFFECTING PREGNANCY IN SECOND TRIMESTER: Status: ACTIVE | Noted: 2023-11-29

## 2023-12-01 PROBLEM — O09.90 SUPERVISION OF HIGH RISK PREGNANCY, ANTEPARTUM: Status: ACTIVE | Noted: 2023-11-29

## 2023-12-01 PROBLEM — O09.899 SHORT INTERVAL BETWEEN PREGNANCIES AFFECTING PREGNANCY, ANTEPARTUM: Status: ACTIVE | Noted: 2023-11-29

## 2023-12-01 PROBLEM — Z02.89 ENCOUNTER FOR HEALTH EXAMINATION OF REFUGEE: Status: RESOLVED | Noted: 2022-04-15 | Resolved: 2023-12-01

## 2023-12-01 PROBLEM — U07.1 INFECTION DUE TO 2019 NOVEL CORONAVIRUS: Status: RESOLVED | Noted: 2022-09-26 | Resolved: 2023-12-01

## 2023-12-01 PROBLEM — Z60.3 LANGUAGE BARRIER, CULTURAL DIFFERENCES: Status: ACTIVE | Noted: 2023-11-29

## 2023-12-01 PROBLEM — D62 ANEMIA DUE TO BLOOD LOSS, ACUTE: Status: RESOLVED | Noted: 2022-09-26 | Resolved: 2023-12-01

## 2023-12-01 PROBLEM — O09.299 H/O POSTPARTUM HEMORRHAGE, CURRENTLY PREGNANT: Status: ACTIVE | Noted: 2023-11-29

## 2023-12-01 LAB — LEAD BLDV-MCNC: <2 UG/DL

## 2023-12-01 NOTE — PROGRESS NOTES
Family Medicine OB Education    I provided the following OB education to Chrystal Justice.    Discussed that Dr Dhaliwal should be the doctor that she sees for her prenatal visits and that provider will try hard to be the one to deliver her baby.  Discussed that if primary provider was unavailable that one of our other physicians would deliver the baby and that this could be a male or female provider.  Briefly discussed residency program and that multiple doctors would be present at time of delivery.  Sheet given and discussed fetal growth and development.  Sheet given and discussed warning signs with reasons to call clinic or L&D with questions or concerns (phone numbers given).  Sheet given and discussed Tdap to be given after 27 weeks gestation and the importance of family members get immunized before delivery.  Proof of pregnancy completed and given to pt.  Gave pt preregistration form for hospital to complete.  See questionnaire and pregnancy risk assessment for further information in provider encounter.       Name of provider who requested the OB education: Dr Dhaliwal  Name of provider on site (faculty or community preceptor) at the time of performing the OB education: Dr Sobia Gallo, RN, BSN

## 2023-12-04 LAB
BKR LAB AP GYN ADEQUACY: NORMAL
BKR LAB AP GYN INTERPRETATION: NORMAL
BKR LAB AP HPV REFLEX: NO
BKR LAB AP LMP: NORMAL
BKR LAB AP PREVIOUS ABNORMAL: NORMAL
PATH REPORT.COMMENTS IMP SPEC: NORMAL
PATH REPORT.COMMENTS IMP SPEC: NORMAL
PATH REPORT.RELEVANT HX SPEC: NORMAL

## 2023-12-11 ENCOUNTER — TELEPHONE (OUTPATIENT)
Dept: FAMILY MEDICINE | Facility: CLINIC | Age: 28
End: 2023-12-11
Payer: COMMERCIAL

## 2023-12-11 NOTE — TELEPHONE ENCOUNTER
LMTCC with Mojgan GILLILAND) to help pt schedule OB u/s on/after 12/18/23 and also ANGELINE with Dr Dhaliwal./Ruchi Gallo RN BSN

## 2024-01-04 ENCOUNTER — PRE VISIT (OUTPATIENT)
Dept: MATERNAL FETAL MEDICINE | Facility: HOSPITAL | Age: 29
End: 2024-01-04
Payer: COMMERCIAL

## 2024-01-05 NOTE — TELEPHONE ENCOUNTER
Called pt with Mojgan GILLILAND) and she is scheduled for u/s on 1/9/24 at [facility].  Scheduled pt for a ANGELINE with Dr Deleon on Monday, 1/8/24 at 4:30 PM.  She denies any questions or concerns./Ruchi Gallo RN BSN

## 2024-01-08 ENCOUNTER — OFFICE VISIT (OUTPATIENT)
Dept: FAMILY MEDICINE | Facility: CLINIC | Age: 29
End: 2024-01-08
Payer: COMMERCIAL

## 2024-01-08 VITALS
DIASTOLIC BLOOD PRESSURE: 68 MMHG | BODY MASS INDEX: 25.57 KG/M2 | TEMPERATURE: 98.1 F | HEIGHT: 61 IN | RESPIRATION RATE: 20 BRPM | HEART RATE: 91 BPM | WEIGHT: 135.4 LBS | SYSTOLIC BLOOD PRESSURE: 102 MMHG | OXYGEN SATURATION: 98 %

## 2024-01-08 DIAGNOSIS — O09.293 HISTORY OF STILLBIRTH IN CURRENTLY PREGNANT PATIENT, THIRD TRIMESTER: ICD-10-CM

## 2024-01-08 DIAGNOSIS — O09.899 SHORT INTERVAL BETWEEN PREGNANCIES AFFECTING PREGNANCY, ANTEPARTUM: ICD-10-CM

## 2024-01-08 DIAGNOSIS — O09.90 SUPERVISION OF HIGH RISK PREGNANCY, ANTEPARTUM: Primary | ICD-10-CM

## 2024-01-08 PROCEDURE — 90471 IMMUNIZATION ADMIN: CPT

## 2024-01-08 PROCEDURE — 90715 TDAP VACCINE 7 YRS/> IM: CPT

## 2024-01-08 PROCEDURE — 99207 PR PRENATAL VISIT: CPT | Mod: GC

## 2024-01-08 NOTE — PROGRESS NOTES
Preceptor Attestation:    I discussed the patient with the resident and evaluated the patient in person. I have verified the content of the note, which accurately reflects my assessment of the patient and the plan of care.   Supervising Physician:  Keri Ramsay MD

## 2024-01-08 NOTE — PROGRESS NOTES
Assessment & Plan  Chrystal Justice is a 28 year old , at 31w2d weeks of pregnancy with KVNG of Mar 9, 2024 by  Hx of stillbirth, late to care, h PP hem, hx of c section, short interval between this pregnancy and last .     # High risk concerns (medical problems, high risk surgical/obstetric history, high risk social situation, etc): As above  Patient needs to be seen by ob/gyn to discuss TOLAC, high risk given short interval between pregnancies and last .  Lots of education provided on importance of these follow-up visits, the differences between them, importance of routine follow-up.  - In addition to routine prenatal care, patient will need MFM and ob/gyn.   - Problem list reviewed and updated.  -Patient to schedule with MFM tomorrow, possible that they will be able to do 1 hour glucose, CBC, syphilis.  -Tdap received today  -Next visit: Will review recommendations from OB/Gyn, recommend RSV, will review labs    Chrystal was seen today for prenatal care.    Diagnoses and all orders for this visit:    Supervision of high risk pregnancy, antepartum  -     CBC with platelets; Future  -     Syphilis Screen [place] (RPR/VDRL); Future  -     Gestational glucose tolerance testing (GTT 1 hr challenge); Future  -     Ob/Gyn  Referral; Future    Short interval between pregnancies affecting pregnancy, antepartum    Other orders  -     TDAP VACCINE (Adacel, Boostrix)  [[id number]]        -Needs to be seen by MFM for tolac, scheduled for 24    Weight gain adequate: Not found. to date, out of recommended total of 15-25 lbs (pregravid BMI 25-29.9)    There are no Patient Instructions on file for this visit.    Return to clinic in 2 weeks.    Malka Deleon MD  I precepted today with Dr. Keri Ramsay .      Subjective  Concerns: None    ROS:  No - Headache  No - Changes in vision  No - Chest Pain  No - Shortness of Breath  No - Nausea   No - Vomiting  No - Abdominal pain   No - Contractions  No -  "Dysuria   No - Vaginal Discharge    No - Vaginal bleeding   No - Loss of Fluid   No - Extremity swelling   Present - Fetal movement       Patient Active Problem List   Diagnosis    Supervision of high risk pregnancy, antepartum    Language barrier, cultural differences    History of stillbirth    H/O postpartum hemorrhage, currently pregnant    Kidney stone    History of     Keratoconus of both eyes    Myopia of both eyes with astigmatism    Family history of consanguinity    Late prenatal care affecting pregnancy in second trimester    Short interval between pregnancies affecting pregnancy, antepartum       Chrystal Justice speaks Pushto; Setswana so an  was used today.    Guidance:  fetal growth and movement  signs of  labor    Objective  /68   Pulse 91   Temp 98.1  F (36.7  C) (Oral)   Resp 20   Ht 1.544 m (5' 0.8\")   Wt 61.4 kg (135 lb 6.4 oz)   LMP 2023 (Exact Date)   SpO2 98%   BMI 25.75 kg/m    No distress.  Gravid abdomen.  .  Fundal height 30 cm.  no edema.    Results  Blood type: AB POS  No results found for any visits on 24.   "

## 2024-01-09 ENCOUNTER — CARE COORDINATION (OUTPATIENT)
Dept: FAMILY MEDICINE | Facility: CLINIC | Age: 29
End: 2024-01-09
Payer: COMMERCIAL

## 2024-01-09 PROBLEM — Z87.59 HISTORY OF STILLBIRTH: Status: ACTIVE | Noted: 2023-11-29

## 2024-01-09 NOTE — PROGRESS NOTES
Care Coordination Transportation    Appointment Date:  1/25/2024   address: 1:50 pm  Patient Phone: [phone]  Destination:  [address] [place] [address]  Transportation provider:  [employer]: [phone]  Pick time: 1:15 pm - 1:25 pm  Return Trip: Will call  The Patient has been given a reminder call & trip detail's: yes        Darrion Berkowitz Sr.   Care Coordination  [address] [address]  [email or web address]   Office: [phone] Direct: [phone]  [facility]

## 2024-01-09 NOTE — NURSING NOTE
Prior to immunization administration, verified patients identity using patient s name and date of birth. Please see Immunization Activity for additional information.     Screening Questionnaire for Adult Immunization    Are you sick today?   No   Do you have allergies to medications, food, a vaccine component or latex?   No   Have you ever had a serious reaction after receiving a vaccination?   No   Do you have a long-term health problem with heart, lung, kidney, or metabolic disease (e.g., diabetes), asthma, a blood disorder, no spleen, complement component deficiency, a cochlear implant, or a spinal fluid leak?  Are you on long-term aspirin therapy?   No   Do you have cancer, leukemia, HIV/AIDS, or any other immune system problem?   No   Do you have a parent, brother, or sister with an immune system problem?   No   In the past 3 months, have you taken medications that affect  your immune system, such as prednisone, other steroids, or anticancer drugs; drugs for the treatment of rheumatoid arthritis, Crohn s disease, or psoriasis; or have you had radiation treatments?   No   Have you had a seizure, or a brain or other nervous system problem?   No   During the past year, have you received a transfusion of blood or blood    products, or been given immune (gamma) globulin or antiviral drug?   No   For women: Are you pregnant or is there a chance you could become       pregnant during the next month?   Yes   Have you received any vaccinations in the past 4 weeks?   No     Immunization questionnaire was positive for at least one answer.  Notified Dr. Deleon.      Patient instructed to remain in clinic for 15 minutes afterwards, and to report any adverse reactions.     Screening performed by Bhavin Bernard on 1/8/2024 at 5:00 PM.

## 2024-01-17 ENCOUNTER — OFFICE VISIT (OUTPATIENT)
Dept: MATERNAL FETAL MEDICINE | Facility: HOSPITAL | Age: 29
End: 2024-01-17
Attending: OBSTETRICS & GYNECOLOGY
Payer: COMMERCIAL

## 2024-01-17 ENCOUNTER — ANCILLARY PROCEDURE (OUTPATIENT)
Dept: ULTRASOUND IMAGING | Facility: HOSPITAL | Age: 29
End: 2024-01-17
Attending: OBSTETRICS & GYNECOLOGY
Payer: COMMERCIAL

## 2024-01-17 ENCOUNTER — MEDICAL CORRESPONDENCE (OUTPATIENT)
Dept: HEALTH INFORMATION MANAGEMENT | Facility: CLINIC | Age: 29
End: 2024-01-17

## 2024-01-17 DIAGNOSIS — O09.292 HISTORY OF STILLBIRTH IN CURRENTLY PREGNANT PATIENT, SECOND TRIMESTER: ICD-10-CM

## 2024-01-17 DIAGNOSIS — Z84.3 FAMILY HISTORY OF CONSANGUINITY: ICD-10-CM

## 2024-01-17 DIAGNOSIS — O09.293 HISTORY OF STILLBIRTH IN CURRENTLY PREGNANT PATIENT, THIRD TRIMESTER: Primary | ICD-10-CM

## 2024-01-17 DIAGNOSIS — O09.293 HISTORY OF STILLBIRTH IN PREGNANT PATIENT IN THIRD TRIMESTER, ANTEPARTUM: Primary | ICD-10-CM

## 2024-01-17 DIAGNOSIS — Z31.5 ENCOUNTER FOR PROCREATIVE GENETIC COUNSELING: ICD-10-CM

## 2024-01-17 PROCEDURE — 99214 OFFICE O/P EST MOD 30 MIN: CPT | Mod: 25 | Performed by: OBSTETRICS & GYNECOLOGY

## 2024-01-17 PROCEDURE — 96040 HC GENETIC COUNSELING, EACH 30 MINUTES: CPT

## 2024-01-17 PROCEDURE — 99207 PR NO CHARGE LOS: CPT

## 2024-01-17 PROCEDURE — 76811 OB US DETAILED SNGL FETUS: CPT

## 2024-01-17 PROCEDURE — 76811 OB US DETAILED SNGL FETUS: CPT | Mod: 26 | Performed by: OBSTETRICS & GYNECOLOGY

## 2024-01-17 NOTE — PROGRESS NOTES
Please see full imaging report from [employer] program under imaging tab.    Thank-you for referring your patient for ultrasound assessment.    The patient is a  at 32 weeks 4 days by 24 week US. She has a history of adverse outcomes in pregnancy, but [facility] and we do not have records to review. It includes the followin - 24 week IUFD in the context of maternal urosepsis  8 week SAB   - 40 week  - infant  at 2 days of life after developing fever - unclear etiology   - 40 week   2022 - C/S 39w6d ( in [place]) primary LTCS, 8 lb 12 oz.     Discussion today was with the patient and her partner using an [employer]  by iPad. We reviewed the reassuring but limited US findings. She opted not to do aneuploidy screening.     Request for US today had indication of prior IUFD at 24 weeks. Her chart was reviewed and she had already had a consultation with our team for this indication before this pregnancy and so a repeat for the same indication was not recommended. Therefore she was only scheduled for an US today.     The patient and her partner were confused about the nature of today's visit. They indicated that they were told that there was an infection in her C/S incision that was not totally healed and I would be able to determine what was going on with that today and tell her if she could have a TOLAC. She does not want a repeat C/S.     She will be approximately 17-18 months post prior C/S when she is full term. She had two term SVDs prior to her LTCS and so I do think she is a reasonable candidate for a TOLAC. It would be beneficial to repeat her growth US in four weeks, to determine if this fetus is much larger than her prior deliveries, as this may impact her TOLAC success. She did not want to schedule another US through our office today but wishes instead to schedule through her primary OB team.     I did speak with Dr. Ramsay on her OB team today and they  indicated that the patient does not have concern for a  scar infection, but rather they discussed the possibility that her incision may not be totally healed due to a slightly shorter interpregnancy interval. He plans to reach out to her and her  today to better clarify what she was referred to us for and our shared recommendations for her.     Return to primary provider for continued prenatal care. Consideration for fetal growth in about 4 weeks and possibly NST or BPP weekly in the later third trimester given history of IUFD (although it was at 24 weeks in the context of maternal infection so may not be necessary if normal US findings in 4 weeks).     If you have questions regarding today's evaluation or if we can be of further service, please contact the M[facility] Center.     I spent a total of 35 minutes on the date of this encounter including preparing to see the patient (reviewing medical records/tests), counseling and discussing the plan of care, documenting the visit in the electronic medical record, and communicating with other health care professionals and/or care coordination. Apologies were provided to the couple for the confusing nature of the reasons for her visit today.     Chikis Maradiaga MD  Maternal Fetal Medicine

## 2024-01-17 NOTE — NURSING NOTE
Chrystal Justice is a  at 32w4d who presents to [facility] for comprehensive ultrasound.  Armenian  utilized for today's visit.  Pt reports positive fetal movement. Pt denies bldg/lof/change in discharge, contractions, headache, vision changes, chest pain/SOB or edema. SBAR given to Dr. Maradiaga, see note in Epic.      Amada Bautista RN

## 2024-01-17 NOTE — PROGRESS NOTES
[facility]  Genetic Counseling Consult    Patient:  Chrystal Justice YOB: 1995   Date of Service:  24   MRN: [id number]    Chrystal was seen at the [facility] [place] for genetic consultation. The indication for genetic counseling is  history of stillbirth, family history of consanguinity, and discussion of genetic screening and testing options  . The patient was accompanied to this visit by their  Jam and their son.    The session was conducted with a Urdu ipad  ([employer] ID # [id number]) due to the patient speaking limited English.      IMPRESSION/ PLAN   1. Chrystal has not had genetic screening in this pregnancy but elected to have screening today.     2. During today's [facility] visit, Chrystal consented for a blood draw for non-invasive prenatal testing (also called NIPT, NIPS, or cell-free DNA) through [facility], and an order was placed for her screening. However, Chrystal and her family left clinic following her ultrasound. NIPT and any other labs ordered by there providers were therefore not drawn. Chrystal's NIPT order has been cancelled. If she would still like NIPT during the window in which her consent form is still activate, please coordinate with [facility] for a new order and for new test paperwork/kit to be sent to the lab.    3. Carrier screening was partially discussed but declined today. Jam states that carrier screening could be discussed later. Please see details below.    4. Chrystal had a level II comprehensive anatomy ultrasound today. Please see the ultrasound report for further details.    5. Chrystal has a visit with her OB provider scheduled for 2024.    PREGNANCY HISTORY   /Parity:       Chrystal's pregnancy history is significant for:   2016:  24w0d, stillbirth; urinary tract sepsis ([place])   2019: SAB 8w0d ([place])   2020: Term male; developed fever  "at 2 days of age and passed away at 1 month ([facility])   : Term male; PPH and blood transfusion ([facility])   : Term male;  due to macrosomia ([place])  : Current pregnancy     CURRENT PREGNANCY   Current Age: 28 year old   Age at Delivery: 28 year old  KVNG: 3/9/2024, by Ultrasound                                   Gestational Age: 32w4d  This pregnancy is a single gestation.     MEDICAL HISTORY   Per the medical record, Chrystal's medical history includes myopia of both eyes with astigmatism, keratoconus of both eyes, and kidney stone. For a complete review of her diagnoses and medical history, please refer to the medical record.       FAMILY HISTORY   A three-generation pedigree was not obtained today due to our focus on other topics.     Isha are first cousins. Therefore, they have a relation of 3rd degree and 12.5% of their DNA is shared. Please see further information discussed under the \"Carrier Screening\" section of this note.       RISK ASSESSMENT FOR CHROMOSOME CONDITIONS   We explained that the risk for fetal chromosome abnormalities increases with maternal age. We discussed specific features of common chromosome abnormalities, including Down syndrome, trisomy 13, trisomy 18, and sex chromosome trisomies. For Chrystal, the risk for aneuploidy in the current pregnancy based on maternal age alone is <1%.    At age 28 at midtrimester, the risk to have a baby with Down syndrome is 1 in 760.   At age 28 at midtrimester, the risk to have a baby with any chromosome abnormality is 1 in 380.     Chrystal has not had genetic screening in this pregnancy but elected to have screening today. However, screening was not performed.    GENETIC TESTING OPTIONS FOR CHROMOSOMAL CONDITIONS   Genetic testing during a pregnancy includes screening and diagnostic procedures.      Screening tests are non-invasive which means no risk to the pregnancy and includes ultrasounds and blood work. " The benefits and limitations of screening were reviewed. Screening tests provide a risk assessment (chance) specific to the pregnancy for certain fetal chromosome abnormalities but cannot definitively diagnose or exclude a fetal chromosome abnormality. Follow-up genetic counseling and consideration of diagnostic testing is recommended with any abnormal screening result. Diagnostic testing during a pregnancy is more certain and can test for more conditions. However, the tests do have a risk of miscarriage that requires careful consideration. These tests can detect fetal chromosome abnormalities with greater than 99% certainty. Results can be compromised by maternal cell contamination or mosaicism and are limited by the resolution of current genetic testing technology.     There is no screening or diagnostic test that detects all forms of birth defects or intellectual disability.     We discussed the following screening options:   Non-invasive prenatal testing (NIPT)  Also called cell-free DNA screening because it detects chromosomes from the placenta in the pregnant person's blood  Can be done any time after 10 weeks gestation  Screens for trisomy 21, trisomy 18, trisomy 13, and sex chromosome aneuploidies  Cannot screen for open neural tube defects, maternal serum AFP after 15 weeks is recommended    Ultrasound options include:  Comprehensive level II ultrasound (Fetal Anatomy Ultrasound)  Ultrasound done between 18-20 weeks gestation  Screens for major birth defects and markers for aneuploidy (like trisomy 21 and trisomy 18)  Includes looking at the fetus/baby's growth, heart, organs (stomach, kidneys), placenta, and amniotic fluid    We discussed the following diagnostic options:   Amniocentesis  Invasive diagnostic procedure done after 15 weeks gestation  The procedure collects a small sample of amniotic fluid for the purpose of chromosomal testing and/or other genetic testing  Diagnostic result; more than 99%  sensitivity for fetal chromosome abnormalities  Testing for AFP in the amniotic fluid can test for open neural tube defects    CARRIER SCREENING   Expanded carrier screening is available to screen for autosomal recessive conditions and X-linked conditions in a large list of genes. Autosomal recessive conditions happen when a mutation has been inherited from the egg and sperm and include conditions like cystic fibrosis, thalassemia, hearing loss, spinal muscular atrophy, and more. X-linked conditions happen when a mutation has been inherited from the egg and include conditions like fragile X syndrome. [place] screening is also performed following a delivery. About [place]  Screening    National recommendations state that all pregnant individuals or individuals planning to become pregnant should be offered carrier screening. For couples with a history of consanguinity, there may be an increased risk for autosomal recessive conditions due to shared ancestry. Isha are first cousins. Therefore, they have a relation of 3rd degree and 12.5% of their DNA is shared.    We began discussing single genes and autosomal recessive inheritance. We reviewed that if two parents are carriers of the same condition, then there is a risk for their children to have the condition. Jam said that if this testing is required, then order it; if not, do not order it. He then said that they have another appointment they had to get to. I informed them that their next appointment is with us here at [facility] for ultrasound and that we were allotted 45 minutes for our session. I stated that carrier screening is not required but is recommended to be offered. I also discussed that in order to have carrier screening we need to have a conversation on what the screening looks for and how it may impact their family (informed consent). If they do not want to have this discussion now, we do not have to have screening done today. The couple asked  to have NIPT today but to talk about carrier screening later.    Therefore, the patient declined carrier screening today. They declined a thorough discussion of the options. The option will remain, and they can contact us if they would like to pursue screening in the future.         It was a pleasure to be involved with Chrystal s care. Face-to-face time of the meeting was 45 minutes.    Daniel Fink MS, [facility]  Board Certified and [place] Licensed Genetic Counselor  [facility]  Maternal Fetal Medicine  Office: [phone]  [facility]: [phone]   Fax: [phone]  [facility]

## 2024-01-18 NOTE — TELEPHONE ENCOUNTER
A [employer]  was used for this call.    Called regarding MFM appointment and my conversation with Dr. Maradiaga. Discussed recommendations by MFM, and general feeling that it is probably safe to try for a TOLAC, however this will be at the discretion of the Ob/Gyn who is consulted to see the patient. Discussed why we have our concerns about TOLAC. Discussed referral to OB/Gyn closer to 36 weeks.    Patient indicates understanding. We confirmed her  upcoming appointment on 1/25. She did not voice any additional questions.    Keri Ramsay MD

## 2024-01-25 ENCOUNTER — OFFICE VISIT (OUTPATIENT)
Dept: FAMILY MEDICINE | Facility: CLINIC | Age: 29
End: 2024-01-25
Payer: COMMERCIAL

## 2024-01-25 VITALS
OXYGEN SATURATION: 98 % | HEART RATE: 95 BPM | SYSTOLIC BLOOD PRESSURE: 103 MMHG | WEIGHT: 138.4 LBS | BODY MASS INDEX: 26.13 KG/M2 | DIASTOLIC BLOOD PRESSURE: 57 MMHG | HEIGHT: 61 IN | RESPIRATION RATE: 20 BRPM | TEMPERATURE: 98.1 F

## 2024-01-25 DIAGNOSIS — O09.293 HISTORY OF STILLBIRTH IN CURRENTLY PREGNANT PATIENT, THIRD TRIMESTER: ICD-10-CM

## 2024-01-25 DIAGNOSIS — O09.90 SUPERVISION OF HIGH RISK PREGNANCY, ANTEPARTUM: Primary | ICD-10-CM

## 2024-01-25 PROBLEM — Z98.891 HISTORY OF C-SECTION: Status: ACTIVE | Noted: 2023-11-29

## 2024-01-25 LAB
ERYTHROCYTE [DISTWIDTH] IN BLOOD BY AUTOMATED COUNT: 12.5 % (ref 10–15)
GLUCOSE 1H P 50 G GLC PO SERPL-MCNC: 119 MG/DL (ref 70–129)
HCT VFR BLD AUTO: 33.2 % (ref 35–47)
HGB BLD-MCNC: 11.2 G/DL (ref 11.7–15.7)
MCH RBC QN AUTO: 30.6 PG (ref 26.5–33)
MCHC RBC AUTO-ENTMCNC: 33.7 G/DL (ref 31.5–36.5)
MCV RBC AUTO: 91 FL (ref 78–100)
PLATELET # BLD AUTO: 221 10E3/UL (ref 150–450)
RBC # BLD AUTO: 3.66 10E6/UL (ref 3.8–5.2)
WBC # BLD AUTO: 8.9 10E3/UL (ref 4–11)

## 2024-01-25 PROCEDURE — 90678 RSV VACC PREF BIVALENT IM: CPT

## 2024-01-25 PROCEDURE — 87086 URINE CULTURE/COLONY COUNT: CPT

## 2024-01-25 PROCEDURE — 82950 GLUCOSE TEST: CPT

## 2024-01-25 PROCEDURE — 36415 COLL VENOUS BLD VENIPUNCTURE: CPT

## 2024-01-25 PROCEDURE — 86780 TREPONEMA PALLIDUM: CPT

## 2024-01-25 PROCEDURE — 96372 THER/PROPH/DIAG INJ SC/IM: CPT

## 2024-01-25 PROCEDURE — 85027 COMPLETE CBC AUTOMATED: CPT

## 2024-01-25 PROCEDURE — 99207 PR PRENATAL VISIT: CPT | Mod: 25

## 2024-01-25 NOTE — PROGRESS NOTES
Assessment & Plan  Chrystal Justice is a 28 year old , at 33w5d weeks of pregnancy with KVNG of Mar 9, 2024 by LMP.  Hx of stillbirth, late to care, h PP hem, hx of c section, short interval between this pregnancy and last .      # High risk concerns (medical problems, high risk surgical/obstetric history, high risk social situation, etc): As above  Patient needs to be seen by ob/gyn to discuss TOLAC, high risk given short interval between pregnancies and last .  Tdap received.   - In addition to routine prenatal care, patient will need MFM and ob/gyn.   - Problem list reviewed and updated.  -1 hour glucose, CBC, syphilis. Unfortunately not previously done.   -Next visit: Will review recommendations from OB/Gyn, recommend RSV, will review labs    Chrystal was seen today for prenatal care.    Diagnoses and all orders for this visit:    Supervision of high risk pregnancy, antepartum  -     Gestational glucose tolerance testing (GTT 1 hr challenge); Future  -     US OB > 14 Weeks; Future  -     Breast Pump Order for DME - ONLY FOR DME    Other orders  -     RSV VACCINE (ABRYSVO)        Weight gain: Not found. to date, out of recommended total of 15-25 lbs (pregravid BMI 25-29.9)    Patient Instructions   [facility]  [address]  [place] [address]    Phone: [phone]    APPT: Friday, 24 at 2:15 PM with Dr Ocampo      Biofreeze:       Return to clinic in 2 weeks.    Malka Deleon MD  I precepted today with Wyatt Jaimes MD.            Subjective  Concerns: Burning pain over skin of belly, left side. 1 week, somewhat better. Uses Vaseline which can help.     ROS:  No - Headache  No - Changes in vision  No - Chest Pain  No - Shortness of Breath  No - Nausea   No - Vomiting  No - Abdominal pain   No - Contractions  No - Dysuria   No - Vaginal Discharge    No - Vaginal bleeding   No - Loss of Fluid   No - Extremity swelling   Present - Fetal movement       Going to  "WIC? Yes      Patient Active Problem List   Diagnosis    Supervision of high risk pregnancy, antepartum    Language barrier, cultural differences    History of stillbirth in currently pregnant patient, third trimester    H/O postpartum hemorrhage, currently pregnant    Kidney stone    History of     Keratoconus of both eyes    Myopia of both eyes with astigmatism    Family history of consanguinity    Late prenatal care affecting pregnancy in second trimester    Short interval between pregnancies affecting pregnancy, antepartum       Chrystal Justice speaks Pushto; Italian so an  was used today.    Guidance:  vaccines  warning signs/PIH    Do you need help getting a car seat? YES  Do you need help getting a breast pump? YES      Objective  /57   Pulse 95   Temp 98.1  F (36.7  C) (Oral)   Resp 20   Ht 1.539 m (5' 0.6\")   Wt 62.8 kg (138 lb 6.4 oz)   LMP 2023 (Exact Date)   SpO2 98%   BMI 26.50 kg/m    No distress.  Gravid abdomen.  .  Fundal height 31 cm.  no edema.  Skin: Normal, no rashes    Results  Blood type: AB POS  No results found for any visits on 24.   "

## 2024-01-25 NOTE — PATIENT INSTRUCTIONS
[facility]  [address]  [place] [address]    Phone: [phone]    APPT: Friday, 1/26/24 at 2:15 PM with Dr Terrence Albert:

## 2024-01-25 NOTE — PROGRESS NOTES
Preceptor Attestation:    I discussed the patient with the resident and evaluated the patient in person. I have verified the content of the note, which accurately reflects my assessment of the patient and the plan of care.   Supervising Physician:  Wyatt Jaimes MD.

## 2024-01-25 NOTE — PROGRESS NOTES
Prior to immunization administration, verified patients identity using patient s name and date of birth. Please see Immunization Activity for additional information.     Screening Questionnaire for Adult Immunization    Are you sick today?   No   Do you have allergies to medications, food, a vaccine component or latex?   No   Have you ever had a serious reaction after receiving a vaccination?   No   Do you have a long-term health problem with heart, lung, kidney, or metabolic disease (e.g., diabetes), asthma, a blood disorder, no spleen, complement component deficiency, a cochlear implant, or a spinal fluid leak?  Are you on long-term aspirin therapy?   No   Do you have cancer, leukemia, HIV/AIDS, or any other immune system problem?   No   Do you have a parent, brother, or sister with an immune system problem?   No   In the past 3 months, have you taken medications that affect  your immune system, such as prednisone, other steroids, or anticancer drugs; drugs for the treatment of rheumatoid arthritis, Crohn s disease, or psoriasis; or have you had radiation treatments?   No   Have you had a seizure, or a brain or other nervous system problem?   No   During the past year, have you received a transfusion of blood or blood    products, or been given immune (gamma) globulin or antiviral drug?   No   For women: Are you pregnant or is there a chance you could become       pregnant during the next month?   Yes   Have you received any vaccinations in the past 4 weeks?   No     Immunization questionnaire was positive for at least one answer.  Notified Dr. Deleon.      Patient instructed to remain in clinic for 15 minutes afterwards, and to report any adverse reactions.     Screening performed by Lola Gloria MA on 1/25/2024 at 2:47 PM.

## 2024-01-26 ENCOUNTER — TRANSFERRED RECORDS (OUTPATIENT)
Dept: HEALTH INFORMATION MANAGEMENT | Facility: CLINIC | Age: 29
End: 2024-01-26
Payer: COMMERCIAL

## 2024-01-26 LAB
BACTERIA UR CULT: NO GROWTH
T PALLIDUM AB SER QL: NONREACTIVE

## 2024-01-26 NOTE — PROGRESS NOTES
1/26/24 Requested car seat for pt online through [employer].  They will contact pt once they are ready to deliver to arrange time for delivery and instruct pt on how to use car seat (usually takes 3 weeks from the time referral received to get call for delivery).  Emailed request for breast pump to [facility] [employer]. Breast pump will be delivered to pt's home./NG

## 2024-02-12 ENCOUNTER — ANCILLARY PROCEDURE (OUTPATIENT)
Dept: ULTRASOUND IMAGING | Facility: CLINIC | Age: 29
End: 2024-02-12
Attending: FAMILY MEDICINE
Payer: COMMERCIAL

## 2024-02-12 ENCOUNTER — OFFICE VISIT (OUTPATIENT)
Dept: FAMILY MEDICINE | Facility: CLINIC | Age: 29
End: 2024-02-12
Payer: COMMERCIAL

## 2024-02-12 VITALS
TEMPERATURE: 98.5 F | DIASTOLIC BLOOD PRESSURE: 69 MMHG | HEART RATE: 88 BPM | WEIGHT: 142.4 LBS | RESPIRATION RATE: 20 BRPM | OXYGEN SATURATION: 96 % | SYSTOLIC BLOOD PRESSURE: 113 MMHG | BODY MASS INDEX: 27.26 KG/M2

## 2024-02-12 DIAGNOSIS — Z98.891 HISTORY OF C-SECTION: ICD-10-CM

## 2024-02-12 DIAGNOSIS — O09.90 SUPERVISION OF HIGH RISK PREGNANCY, ANTEPARTUM: Primary | ICD-10-CM

## 2024-02-12 DIAGNOSIS — O09.90 SUPERVISION OF HIGH RISK PREGNANCY, ANTEPARTUM: ICD-10-CM

## 2024-02-12 DIAGNOSIS — O36.63X0 LGA (LARGE FOR GESTATIONAL AGE) FETUS AFFECTING MANAGEMENT OF MOTHER, THIRD TRIMESTER, NOT APPLICABLE OR UNSPECIFIED FETUS: ICD-10-CM

## 2024-02-12 PROCEDURE — 36415 COLL VENOUS BLD VENIPUNCTURE: CPT

## 2024-02-12 PROCEDURE — 76816 OB US FOLLOW-UP PER FETUS: CPT | Mod: TC | Performed by: RADIOLOGY

## 2024-02-12 PROCEDURE — 83789 MASS SPECTROMETRY QUAL/QUAN: CPT | Mod: 90

## 2024-02-12 PROCEDURE — 87653 STREP B DNA AMP PROBE: CPT

## 2024-02-12 PROCEDURE — 99000 SPECIMEN HANDLING OFFICE-LAB: CPT

## 2024-02-12 PROCEDURE — 99207 PR PRENATAL VISIT: CPT | Mod: GC

## 2024-02-12 NOTE — NURSING NOTE
Due to patient being non-English speaking/uses sign language, an  was used for this visit. Only for face-to-face interpretation by an external agency, date and length of interpretation can be found on the scanned worksheet.     name: steve @ [phone]   Agency: [employer]  Language:  Mojgan    Telephone number: [phone]  Type of interpretation: Telephone, spoken

## 2024-02-12 NOTE — PROGRESS NOTES
Assessment & Plan  Chrystal Justice is a 28 year old , at 36w2d weeks of pregnancy with KVNG of Mar 9, 2024 by LMP.  Hx of stillbirth, late to care, h PP hem, hx of c section, short interval between this pregnancy and last .      # High risk concerns (medical problems, high risk surgical/obstetric history, high risk social situation, etc): As above  Patient needs to be seen by ob/gyn to discuss TOLAC again due to recent US. Reviewed with pt, 90%ile, abdomen is >97%ile, head is 94%ile. Also high risk given short interval between pregnancies and last .  Reviewed past labs including normal GTT. Has increased abdominal itching today.   - In addition to routine prenatal care, patient will need MFM and ob/gyn.   - Problem list reviewed and updated.  -Anticipate pt will need to deliver at 39 weeks. Unclear if can trial vaginal vs planned       1. Supervision of high risk pregnancy, antepartum  - Bile acids fractionated and total; Future  - Ob/Gyn  Referral; Future  - Group B strep PCR  - Bile acids fractionated and total      There are no diagnoses linked to this encounter.    Weight gain unclear, unable to pull this up today: Not found. to date, out of recommended total of 15-25 lbs (pregravid BMI 25-29.9)    There are no Patient Instructions on file for this visit.    Return to clinic in 2 weeks.    Malka Deleon MD  I precepted today with Wyatt Jaimes MD.            Subjective  Concerns: Abdominal itching / burning sensation. Lotion and topicals did not help.     ROS:  No - Headache  No - Changes in vision  No - Chest Pain  No - Shortness of Breath  No - Nausea   No - Vomiting  No - Abdominal pain   No - Contractions  No - Dysuria   No - Vaginal Discharge    No - Vaginal bleeding   No - Loss of Fluid   No - Extremity swelling   Present - Fetal movement       Going to WIC? Yes      Patient Active Problem List   Diagnosis    Supervision of high risk pregnancy, antepartum     Language barrier, cultural differences    History of stillbirth in currently pregnant patient, third trimester    H/O postpartum hemorrhage, currently pregnant    Kidney stone    History of     Keratoconus of both eyes    Myopia of both eyes with astigmatism    Family history of consanguinity    Late prenatal care affecting pregnancy in second trimester    Short interval between pregnancies affecting pregnancy, antepartum       Chrystal Justice speaks Pushto; Slovak so an  was used today.    Guidance:  US result and lab results      Objective  /69   Pulse 88   Temp 98.5  F (36.9  C) (Oral)   Resp 20   Wt 64.6 kg (142 lb 6.4 oz)   LMP 2023 (Exact Date)   SpO2 96%   BMI 27.26 kg/m    No distress.  Gravid abdomen.  .  Fundal height 37 cm.  no edema.    Results  Blood type: AB POS  No results found for any visits on 24.

## 2024-02-13 LAB — GP B STREP DNA SPEC QL NAA+PROBE: NEGATIVE

## 2024-02-14 PROBLEM — O36.63X0 LGA (LARGE FOR GESTATIONAL AGE) FETUS AFFECTING MANAGEMENT OF MOTHER, THIRD TRIMESTER, NOT APPLICABLE OR UNSPECIFIED FETUS: Status: ACTIVE | Noted: 2024-02-12

## 2024-02-18 LAB
BILE AC SERPL-SCNC: 4.9 UMOL/L
CDCAE SERPL-SCNC: 1.7 UMOL/L
CHOLATE SERPL-SCNC: 1.5 UMOL/L
DO-CHOLATE SERPL-SCNC: 1.4 UMOL/L
URSODEOXYCHOLATE SERPL-SCNC: 0.3 UMOL/L

## 2024-02-19 ENCOUNTER — OFFICE VISIT (OUTPATIENT)
Dept: FAMILY MEDICINE | Facility: CLINIC | Age: 29
End: 2024-02-19
Payer: COMMERCIAL

## 2024-02-19 VITALS
BODY MASS INDEX: 28.39 KG/M2 | WEIGHT: 144.6 LBS | RESPIRATION RATE: 18 BRPM | OXYGEN SATURATION: 96 % | SYSTOLIC BLOOD PRESSURE: 103 MMHG | HEART RATE: 96 BPM | HEIGHT: 60 IN | DIASTOLIC BLOOD PRESSURE: 68 MMHG | TEMPERATURE: 97.9 F

## 2024-02-19 DIAGNOSIS — O09.30 LATE PRENATAL CARE AFFECTING PREGNANCY, ANTEPARTUM: ICD-10-CM

## 2024-02-19 DIAGNOSIS — O09.299 H/O POSTPARTUM HEMORRHAGE, CURRENTLY PREGNANT: ICD-10-CM

## 2024-02-19 DIAGNOSIS — L29.9 ITCHING: ICD-10-CM

## 2024-02-19 DIAGNOSIS — Z87.59 HISTORY OF STILLBIRTH: ICD-10-CM

## 2024-02-19 DIAGNOSIS — O09.899 SHORT INTERVAL BETWEEN PREGNANCIES AFFECTING PREGNANCY, ANTEPARTUM: ICD-10-CM

## 2024-02-19 DIAGNOSIS — L30.1 ECZEMA, DYSHIDROTIC: ICD-10-CM

## 2024-02-19 DIAGNOSIS — O09.90 SUPERVISION OF HIGH RISK PREGNANCY, ANTEPARTUM: Primary | ICD-10-CM

## 2024-02-19 PROCEDURE — 99207 PR PRENATAL VISIT: CPT | Mod: GC

## 2024-02-19 NOTE — PROGRESS NOTES
Physician Attestation   I, Sanjay Steven MD, saw this patient and agree with the findings and plan of care as documented in the note.      Items personally reviewed/procedural attestation: vitals.    Sanjay Steven MD

## 2024-02-19 NOTE — PROGRESS NOTES
Assessment & Plan  Chrystal Justice is a 28 year old , at 37w2d weeks of pregnancy with KVNG of Mar 9, 2024 by  LMP.  Hx of stillbirth, late to care, h PP hem, hx of c section, short interval between this pregnancy and last .       # High risk concerns (medical problems, high risk surgical/obstetric history, high risk social situation, etc): As above  Patient needs to be seen by ob/gyn to discuss TOLAC again due to recent US. Reviewed with pt, 90%ile, abdomen is >97%ile, head is 94%ile. Also high risk given short interval between pregnancies and last .  Reviewed past labs including normal GTT, normal bile acid test. Ongoing itching / burning sensation likely due to stretching of skin. Reviewed GBS negative  -Discussed conservative management of skin sensation with ice, moisturizer   - In addition to routine prenatal care, patient will need MFM and ob/gyn.   - Problem list reviewed and updated.  -Anticipate pt will need to deliver at 39 weeks. Unclear if can trial vaginal vs planned   -Has scheduled 24 with OB/gyn to discuss LGA concern  -Plans to breastfeed. Has car seat and breast pump      There are no Patient Instructions on file for this visit.    Return to clinic in 1 week.    Malka Deleon MD  I precepted today with Dr. Sanjay Steven.        Subjective  Concerns: Ongoing stomach burning sensation of skin     ROS:  No - Headache  No - Changes in vision  No - Chest Pain  No - Shortness of Breath  No - Nausea   No - Vomiting  No - Abdominal pain   No - Contractions  No - Dysuria   No - Vaginal Discharge    No - Vaginal bleeding   No - Loss of Fluid   No - Extremity swelling   Present - Fetal movement       Going to WIC? Yes      Patient Active Problem List   Diagnosis    Supervision of high risk pregnancy, antepartum    Language barrier, cultural differences    History of stillbirth in currently pregnant patient, third trimester    H/O postpartum hemorrhage, currently pregnant  "   Kidney stone    History of     Keratoconus of both eyes    Myopia of both eyes with astigmatism    Family history of consanguinity    Late prenatal care affecting pregnancy in second trimester    Short interval between pregnancies affecting pregnancy, antepartum    LGA (large for gestational age) fetus affecting management of mother, third trimester, not applicable or unspecified fetus       Chrystal Justice speaks Pushto; Maltese so an  was used today.    Guidance:  GBS  signs of labor    Do you need help getting a car seat? No  Do you need help getting a breast pump? No      Objective  /68 (BP Location: Left arm, Patient Position: Sitting, Cuff Size: Adult Regular)   Pulse 96   Temp 97.9  F (36.6  C) (Oral)   Resp 18   Ht 1.52 m (4' 11.84\")   Wt 65.6 kg (144 lb 9.6 oz)   LMP 2023 (Exact Date)   SpO2 96%   BMI 28.39 kg/m    No distress.  Gravid abdomen.  .  Fundal height 37 cm.  no edema.    Results  Blood type: AB POS  No results found for any visits on 24.   "

## 2024-02-20 ENCOUNTER — OFFICE VISIT (OUTPATIENT)
Dept: FAMILY MEDICINE | Facility: CLINIC | Age: 29
End: 2024-02-20
Payer: COMMERCIAL

## 2024-02-20 DIAGNOSIS — L30.9 HAND DERMATITIS: Primary | ICD-10-CM

## 2024-02-20 PROCEDURE — 99204 OFFICE O/P NEW MOD 45 MIN: CPT | Performed by: DERMATOLOGY

## 2024-02-20 RX ORDER — TACROLIMUS 1 MG/G
OINTMENT TOPICAL 2 TIMES DAILY
Qty: 60 G | Refills: 3 | Status: SHIPPED | OUTPATIENT
Start: 2024-02-20

## 2024-02-20 RX ORDER — CLOBETASOL PROPIONATE 0.5 MG/G
OINTMENT TOPICAL 2 TIMES DAILY
Qty: 60 G | Refills: 3 | Status: SHIPPED | OUTPATIENT
Start: 2024-02-20

## 2024-02-20 ASSESSMENT — PAIN SCALES - GENERAL: PAINLEVEL: NO PAIN (0)

## 2024-02-20 NOTE — LETTER
2/20/2024         RE: Chrystal Justice  [address]  [place] [address]        Dear Colleague,    Thank you for referring your patient, Chrystal Justice, to the [facility]. Please see a copy of my visit note below.    [facility] Dermatology Note  Encounter Date: Feb 20, 2024  Office Visit     Dermatology Problem List:  1. Hand dermatitis  - Current tx: Clobetasol ointment BID prn, tacrolimus ointment BID prn  * Pending allergy consult.      SHx: 37 weeks pregnant  ____________________________________________    Assessment & Plan:    # Hand dermatitis - atopic v ICD/ACD. Worse with household tasks. Natural history and etiology discussed. Advised topical management as below.   - Start clobetasol 0.5% ointment BID prn then transition to tacrolimus 0.1% ointment BID prn  - Referred to allergy    Procedures Performed:   None.    Follow-up: 3 month(s) in-person, or earlier for new or changing lesions    Staff and Scribe:     Scribe Disclosure:   I, VAL AMBRIZ, am serving as a scribe; to document services personally performed by Sherine Milton MD -based on data collection and the provider's statements to me.    Provider Disclosure:   The documentation recorded by the scribe accurately reflects the services I personally performed and the decisions made by me.    Sherine Milton MD    Department of Dermatology  [facility]: Phone: [phone], Fax: [phone]  2/23/2024     ____________________________________________    CC: Rash (Here for rash on hands)    HPI:  Ms. Chrystal Justice is a(n) 28 year old female who presents today as a new patient for rash.    The patient presents for rash on hands, which has been present for over 6 months. Was pruritic. She does have pruritus on other body parts, but most prominent on hands. She was given a hydrocortisone  w/ Aloe. This is helpful but only for the meantime, then when lotion wears off pruritus will return.    Rash worsens when she is doing house chores like washing dishes.    Patient is otherwise feeling well, without additional skin concerns.    Labs Reviewed:  N/A    Physical Exam:  Vitals: LMP 2023 (Exact Date)   SKIN: Focused examination of the hands was performed.  - hands today clear  - Photos of hands with multiple pink papules.  - No other lesions of concern on areas examined.     Medications:  Current Outpatient Medications   Medication     acetaminophen (TYLENOL) 325 MG tablet     acetaminophen (TYLENOL) 500 MG tablet     famotidine (PEPCID) 40 MG tablet     Prenatal Vit-Fe Fumarate-FA (PRENATAL MULTIVITAMIN W/IRON) 27-0.8 MG tablet     sodium chloride 0.9 % neb solution     No current facility-administered medications for this visit.      Past Medical History:   Patient Active Problem List   Diagnosis     Supervision of high risk pregnancy, antepartum     Language barrier, cultural differences     History of stillbirth in currently pregnant patient, third trimester     H/O postpartum hemorrhage, currently pregnant     Kidney stone     History of      Keratoconus of both eyes     Myopia of both eyes with astigmatism     Family history of consanguinity     Late prenatal care affecting pregnancy in second trimester     Short interval between pregnancies affecting pregnancy, antepartum     LGA (large for gestational age) fetus affecting management of mother, third trimester, not applicable or unspecified fetus     Past Medical History:   Diagnosis Date     Anemia due to blood loss, acute 2022     History of blood transfusion      History of  section      History of postpartum hemorrhage      History of pregnancy loss in prior pregnancy, currently pregnant in second trimester      Infection due to 2019 novel coronavirus 2022     Kidney stone         CC No referring provider  defined for this encounter. on close of this encounter.      Again, thank you for allowing me to participate in the care of your patient.        Sincerely,        Sherine Milton MD

## 2024-02-20 NOTE — PATIENT INSTRUCTIONS
Dyshidrotic eczema  Start a good daily moisturizer such as Vaseline, vanicream, cetaphil, cerave, etc  When needed, use clobetasol twice daily as needed  Then try to transition to Protopic twice daily as needed (protopic is not a steroid so safe to use long-term)  It can sometimes burn with application so would recommend starting with a small test area first. It can also help to keep in the fridge if that occurs. Another heads up that if you take it as a pill, it suppresses the immune system and increases risk of blood cancers so there is a black box warning. This risk is not present in the cream/ointment version but since it's the same medication, it's also listed on there too. Just a heads up for when you .    Allergy referral for patch testing  They will contact you to schedule this appointment

## 2024-02-20 NOTE — PROGRESS NOTES
[facility] Dermatology Note  Encounter Date: Feb 20, 2024  Office Visit     Dermatology Problem List:  1. Hand dermatitis  - Current tx: Clobetasol ointment BID prn, tacrolimus ointment BID prn  * Pending allergy consult.      SHx: 37 weeks pregnant  ____________________________________________    Assessment & Plan:    # Hand dermatitis - atopic v ICD/ACD. Worse with household tasks. Natural history and etiology discussed. Advised topical management as below.   - Start clobetasol 0.5% ointment BID prn then transition to tacrolimus 0.1% ointment BID prn  - Referred to allergy    Procedures Performed:   None.    Follow-up: 3 month(s) in-person, or earlier for new or changing lesions    Staff and Scribe:     Scribe Disclosure:   I, VAL AMBRIZ, am serving as a scribe; to document services personally performed by Sherine Milton MD -based on data collection and the provider's statements to me.    Provider Disclosure:   The documentation recorded by the scribe accurately reflects the services I personally performed and the decisions made by me.    Sherine Milton MD    Department of Dermatology  [facility]: Phone: [phone], Fax: [phone]  2/23/2024     ____________________________________________    CC: Rash (Here for rash on hands)    HPI:  Ms. Chrystal Justice is a(n) 28 year old female who presents today as a new patient for rash.    The patient presents for rash on hands, which has been present for over 6 months. Was pruritic. She does have pruritus on other body parts, but most prominent on hands. She was given a hydrocortisone w/ Aloe. This is helpful but only for the meantime, then when lotion wears off pruritus will return.    Rash worsens when she is doing house chores like washing dishes.    Patient is otherwise feeling well, without additional skin concerns.    Labs  Reviewed:  N/A    Physical Exam:  Vitals: LMP 2023 (Exact Date)   SKIN: Focused examination of the hands was performed.  - hands today clear  - Photos of hands with multiple pink papules.  - No other lesions of concern on areas examined.     Medications:  Current Outpatient Medications   Medication    acetaminophen (TYLENOL) 325 MG tablet    acetaminophen (TYLENOL) 500 MG tablet    famotidine (PEPCID) 40 MG tablet    Prenatal Vit-Fe Fumarate-FA (PRENATAL MULTIVITAMIN W/IRON) 27-0.8 MG tablet    sodium chloride 0.9 % neb solution     No current facility-administered medications for this visit.      Past Medical History:   Patient Active Problem List   Diagnosis    Supervision of high risk pregnancy, antepartum    Language barrier, cultural differences    History of stillbirth in currently pregnant patient, third trimester    H/O postpartum hemorrhage, currently pregnant    Kidney stone    History of     Keratoconus of both eyes    Myopia of both eyes with astigmatism    Family history of consanguinity    Late prenatal care affecting pregnancy in second trimester    Short interval between pregnancies affecting pregnancy, antepartum    LGA (large for gestational age) fetus affecting management of mother, third trimester, not applicable or unspecified fetus     Past Medical History:   Diagnosis Date    Anemia due to blood loss, acute 2022    History of blood transfusion     History of  section     History of postpartum hemorrhage     History of pregnancy loss in prior pregnancy, currently pregnant in second trimester     Infection due to 2019 novel coronavirus 2022    Kidney stone         CC No referring provider defined for this encounter. on close of this encounter.

## 2024-02-21 ENCOUNTER — TELEPHONE (OUTPATIENT)
Dept: FAMILY MEDICINE | Facility: CLINIC | Age: 29
End: 2024-02-21
Payer: COMMERCIAL

## 2024-02-21 DIAGNOSIS — O36.63X0 LGA (LARGE FOR GESTATIONAL AGE) FETUS AFFECTING MANAGEMENT OF MOTHER, THIRD TRIMESTER, NOT APPLICABLE OR UNSPECIFIED FETUS: Primary | ICD-10-CM

## 2024-02-21 DIAGNOSIS — Z98.891 HISTORY OF C-SECTION: ICD-10-CM

## 2024-02-21 NOTE — TELEPHONE ENCOUNTER
[facility] phone call message- general phone call:    Reason for call: she would like to talk to Dr.Lore moreno the patient is going to have a c section and the doctor expressed concerns about baby's weight.    Action desired: call back.    Return call needed: Yes    OK to leave a message on voice mail? Yes    Advised patient to response may take up to 2 business days: Yes    Primary language: Pushto; Frisian      needed? Yes    Call taken on February 21, 2024 at 3:33 PM by Mehdi Angel

## 2024-02-28 NOTE — PROGRESS NOTES
"Assessment & Plan  Chrystal Justice is a 28 year old , at 38w4d weeks of pregnancy with KVNG of Mar 9, 2024 by LMP.  Patient's history is high risk for the following reasons:     # High risk concerns (medical problems, high risk surgical/obstetric history, high risk social situation, etc): Hx of stillbirth, late to care, h PP hem, hx of c section, short interval between this pregnancy and last    -received clearance from OBGYN for TOLAC, will need comanagement   - Problem list reviewed and updated.  -Follow-up next week if not going into labor naturally.  -Given complaints of lower extremity swelling, dizziness, and spots in vision, checking preeclampsia labs today.  Blood pressure is normal, minimal swelling on exam, overall reassuring, but want to cover my bases.    Patient Instructions   [facility]   [facility] , [place] [address]    Please go to this address if you start feeling labor pains that are intense enough you have to stop what you are doing and come every 5 minutes.    Otherwise, we will see you next week.    Return to clinic in 1 week.    Yasmin Dhaliwal MD  I precepted today with Yasmin Browning MD.    Diagnosis or treatment significantly limited by social determinants of health - limited transportation, limited healthcare literacy  30 minutes spent by me on the date of the encounter doing chart review, history and exam, documentation and further activities per the note        Subjective  Concerns: None today    ROS:  No - Headache, small dizziness  YES - Changes in vision, \"ford in vision\" associated with dizziness and numbness in hands, associated fatigue  No - Chest Pain  No - Shortness of Breath  No - Nausea   No - Vomiting  No - Abdominal pain   No - Contractions  No - Dysuria   No - Vaginal Discharge    No - Vaginal bleeding   No - Loss of Fluid   YES - Extremity swelling   Present - Fetal movement       Going to WIC? Yes      Patient Active Problem List " "  Diagnosis    Supervision of high risk pregnancy, antepartum    Language barrier, cultural differences    History of stillbirth in currently pregnant patient, third trimester    H/O postpartum hemorrhage, currently pregnant    Kidney stone    History of     Keratoconus of both eyes    Myopia of both eyes with astigmatism    Family history of consanguinity    Late prenatal care affecting pregnancy in second trimester    Short interval between pregnancies affecting pregnancy, antepartum    LGA (large for gestational age) fetus affecting management of mother, third trimester, not applicable or unspecified fetus       Chrystal Justice speaks Pushto; Thai so an  was used today.    Guidance:  post-partum care    Do you need help getting a car seat? No  Do you need help getting a breast pump? No      Objective  /73   Pulse 88   Temp 97.8  F (36.6  C) (Oral)   Resp 20   Ht 1.537 m (5' 0.5\")   Wt 65.8 kg (145 lb)   LMP 2023 (Exact Date)   SpO2 98%   BMI 27.85 kg/m    No distress.  Gravid abdomen.  FHT 135bpm.  Fundal height 39 cm.  trace edema.  Cervix:  patient declined vaginal exam    Results  Blood type: AB POS  No results found for any visits on 24.    "

## 2024-02-28 NOTE — TELEPHONE ENCOUNTER
Dr Ocampo told Dr Gauthier that patient's pelvis is adequate to attempt a vaginal delivery./Ruchi Gallo RN BSN

## 2024-03-01 ENCOUNTER — OFFICE VISIT (OUTPATIENT)
Dept: FAMILY MEDICINE | Facility: CLINIC | Age: 29
End: 2024-03-01
Payer: COMMERCIAL

## 2024-03-01 VITALS
DIASTOLIC BLOOD PRESSURE: 73 MMHG | HEART RATE: 88 BPM | OXYGEN SATURATION: 98 % | SYSTOLIC BLOOD PRESSURE: 101 MMHG | RESPIRATION RATE: 20 BRPM | TEMPERATURE: 97.8 F | HEIGHT: 61 IN | WEIGHT: 145 LBS | BODY MASS INDEX: 27.38 KG/M2

## 2024-03-01 DIAGNOSIS — O09.32 LATE PRENATAL CARE AFFECTING PREGNANCY IN SECOND TRIMESTER: ICD-10-CM

## 2024-03-01 DIAGNOSIS — O09.299 H/O POSTPARTUM HEMORRHAGE, CURRENTLY PREGNANT: ICD-10-CM

## 2024-03-01 DIAGNOSIS — O09.293 HISTORY OF STILLBIRTH IN CURRENTLY PREGNANT PATIENT, THIRD TRIMESTER: ICD-10-CM

## 2024-03-01 DIAGNOSIS — Z60.3 LANGUAGE BARRIER, CULTURAL DIFFERENCES: ICD-10-CM

## 2024-03-01 DIAGNOSIS — M79.89 PAIN AND SWELLING OF LOWER EXTREMITY, UNSPECIFIED LATERALITY: Primary | ICD-10-CM

## 2024-03-01 DIAGNOSIS — O09.90 SUPERVISION OF HIGH RISK PREGNANCY, ANTEPARTUM: ICD-10-CM

## 2024-03-01 DIAGNOSIS — Z98.891 HISTORY OF C-SECTION: ICD-10-CM

## 2024-03-01 DIAGNOSIS — O36.63X0 LGA (LARGE FOR GESTATIONAL AGE) FETUS AFFECTING MANAGEMENT OF MOTHER, THIRD TRIMESTER, NOT APPLICABLE OR UNSPECIFIED FETUS: ICD-10-CM

## 2024-03-01 DIAGNOSIS — O09.899 SHORT INTERVAL BETWEEN PREGNANCIES AFFECTING PREGNANCY, ANTEPARTUM: ICD-10-CM

## 2024-03-01 DIAGNOSIS — M79.606 PAIN AND SWELLING OF LOWER EXTREMITY, UNSPECIFIED LATERALITY: Primary | ICD-10-CM

## 2024-03-01 DIAGNOSIS — Z84.3 FAMILY HISTORY OF CONSANGUINITY: ICD-10-CM

## 2024-03-01 LAB
ALBUMIN MFR UR ELPH: 12.2 MG/DL
CREAT UR-MCNC: 104 MG/DL
ERYTHROCYTE [DISTWIDTH] IN BLOOD BY AUTOMATED COUNT: 12.7 % (ref 10–15)
HCT VFR BLD AUTO: 34 % (ref 35–47)
HGB BLD-MCNC: 11.4 G/DL (ref 11.7–15.7)
MCH RBC QN AUTO: 30.2 PG (ref 26.5–33)
MCHC RBC AUTO-ENTMCNC: 33.5 G/DL (ref 31.5–36.5)
MCV RBC AUTO: 90 FL (ref 78–100)
PLATELET # BLD AUTO: 256 10E3/UL (ref 150–450)
PROT/CREAT 24H UR: 0.12 MG/MG CR (ref 0–0.2)
RBC # BLD AUTO: 3.77 10E6/UL (ref 3.8–5.2)
WBC # BLD AUTO: 9 10E3/UL (ref 4–11)

## 2024-03-01 PROCEDURE — 99207 PR PRENATAL VISIT: CPT | Mod: GC

## 2024-03-01 PROCEDURE — 85027 COMPLETE CBC AUTOMATED: CPT

## 2024-03-01 PROCEDURE — 36415 COLL VENOUS BLD VENIPUNCTURE: CPT

## 2024-03-01 PROCEDURE — 84156 ASSAY OF PROTEIN URINE: CPT

## 2024-03-01 PROCEDURE — 80076 HEPATIC FUNCTION PANEL: CPT

## 2024-03-01 SDOH — SOCIAL STABILITY - SOCIAL INSECURITY: ACCULTURATION DIFFICULTY: Z60.3

## 2024-03-01 NOTE — PATIENT INSTRUCTIONS
[facility]  [address], [place] [address]    Please go to this address if you start feeling labor pains that are intense enough you have to stop what you are doing and come every 5 minutes.    Otherwise, we will see you next week.

## 2024-03-01 NOTE — PROGRESS NOTES
"Preceptor attestation:  Vital signs reviewed: /73   Pulse 88   Temp 97.8  F (36.6  C) (Oral)   Resp 20   Ht 1.537 m (5' 0.5\")   Wt 65.8 kg (145 lb)   LMP 05/18/2023 (Exact Date)   SpO2 98%   BMI 27.85 kg/m      Patient seen, evaluated, and discussed with the resident.  I verified the content of the note, which accurately reflects my assessment of the patient and the plan of care.    Supervising physician: Yasmin Browning MD  [facility]  "

## 2024-03-02 LAB
ALBUMIN SERPL BCG-MCNC: 3.6 G/DL (ref 3.5–5.2)
ALP SERPL-CCNC: 178 U/L (ref 40–150)
ALT SERPL W P-5'-P-CCNC: 19 U/L (ref 0–50)
AST SERPL W P-5'-P-CCNC: 28 U/L (ref 0–45)
BILIRUB DIRECT SERPL-MCNC: <0.2 MG/DL (ref 0–0.3)
BILIRUB SERPL-MCNC: 0.3 MG/DL
PROT SERPL-MCNC: 6.4 G/DL (ref 6.4–8.3)

## 2024-03-08 ENCOUNTER — OFFICE VISIT (OUTPATIENT)
Dept: FAMILY MEDICINE | Facility: CLINIC | Age: 29
End: 2024-03-08
Payer: COMMERCIAL

## 2024-03-08 VITALS
TEMPERATURE: 98 F | DIASTOLIC BLOOD PRESSURE: 73 MMHG | SYSTOLIC BLOOD PRESSURE: 107 MMHG | OXYGEN SATURATION: 93 % | BODY MASS INDEX: 27.56 KG/M2 | HEART RATE: 91 BPM | RESPIRATION RATE: 18 BRPM | HEIGHT: 61 IN | WEIGHT: 146 LBS

## 2024-03-08 DIAGNOSIS — O09.90 SUPERVISION OF HIGH RISK PREGNANCY, ANTEPARTUM: Primary | ICD-10-CM

## 2024-03-08 LAB
ALBUMIN MFR UR ELPH: 14.3 MG/DL
CREAT UR-MCNC: 98.9 MG/DL
ERYTHROCYTE [DISTWIDTH] IN BLOOD BY AUTOMATED COUNT: 12.7 % (ref 10–15)
HCT VFR BLD AUTO: 33.7 % (ref 35–47)
HGB BLD-MCNC: 11.2 G/DL (ref 11.7–15.7)
MCH RBC QN AUTO: 29.9 PG (ref 26.5–33)
MCHC RBC AUTO-ENTMCNC: 33.2 G/DL (ref 31.5–36.5)
MCV RBC AUTO: 90 FL (ref 78–100)
PLATELET # BLD AUTO: 219 10E3/UL (ref 150–450)
PROT/CREAT 24H UR: 0.14 MG/MG CR (ref 0–0.2)
RBC # BLD AUTO: 3.74 10E6/UL (ref 3.8–5.2)
WBC # BLD AUTO: 7.1 10E3/UL (ref 4–11)

## 2024-03-08 PROCEDURE — 85027 COMPLETE CBC AUTOMATED: CPT | Performed by: STUDENT IN AN ORGANIZED HEALTH CARE EDUCATION/TRAINING PROGRAM

## 2024-03-08 PROCEDURE — 36415 COLL VENOUS BLD VENIPUNCTURE: CPT | Performed by: STUDENT IN AN ORGANIZED HEALTH CARE EDUCATION/TRAINING PROGRAM

## 2024-03-08 PROCEDURE — 80053 COMPREHEN METABOLIC PANEL: CPT | Performed by: STUDENT IN AN ORGANIZED HEALTH CARE EDUCATION/TRAINING PROGRAM

## 2024-03-08 PROCEDURE — 99207 PR PRENATAL VISIT: CPT | Mod: GC | Performed by: STUDENT IN AN ORGANIZED HEALTH CARE EDUCATION/TRAINING PROGRAM

## 2024-03-08 PROCEDURE — 84156 ASSAY OF PROTEIN URINE: CPT | Performed by: STUDENT IN AN ORGANIZED HEALTH CARE EDUCATION/TRAINING PROGRAM

## 2024-03-08 NOTE — PATIENT INSTRUCTIONS
"Delivery Plan   I NEED A [facility] Female      Take me to: [facility]  Phone number: [phone]  [facility] phone number: [phone]    My name is: Chrystal Justice  : 1995    My Doctor is: Yasmin Dhaliwal MD   Attending Physician: [facility]  Prenatal care was at [facility][note 3] [phone].    28 year old         -para:   Estimated Date of Delivery: Mar 9, 2024  At 39w6d on 2024  Delivery type: Vaginal Delivery    Patient Active Problem List   Diagnosis    Supervision of high risk pregnancy, antepartum    Language barrier, cultural differences    History of stillbirth in currently pregnant patient, third trimester    H/O postpartum hemorrhage, currently pregnant    Kidney stone    History of     Keratoconus of both eyes    Myopia of both eyes with astigmatism    Family history of consanguinity    Late prenatal care affecting pregnancy in second trimester    Short interval between pregnancies affecting pregnancy, antepartum    LGA (large for gestational age) fetus affecting management of mother, third trimester, not applicable or unspecified fetus     Allergies:No Known Allergies    Lab Results:  Blood Type: AB POS  GBS:negative Date completed: 24    No results found for: \"N9LK\"  No results found for: \"LCN7\"  No results found for: \"N5UV\"  No results found for: \"N9LF\"  Hemoglobin   Date Value Ref Range Status   2024 11.4 (L) 11.7 - 15.7 g/dL Final   2024 11.2 (L) 11.7 - 15.7 g/dL Final       Last cervical check: 2024 Cervical Dilation: Deferred    Immunization History   Administered Date(s) Administered    COVID-19 Vaccine (Priscila) 2021    Influenza Vaccine >6 months,quad, PF 02/15/2023, 2023    MMR 2021    Poliovirus, inactivated (IPV) 2021    RSV Vaccine (Abrysvo) 2024    TDAP (Adacel,Boostrix) 2021, 2022, 2024    " Varicella 09/05/2021       Who will be present at the delivery?     What are your plans for pain control? Epidural     Who will cut the umbilical Cord? Provider     Do you plan to breastfeed?Yes    If your baby is a boy, would you like him circumcised?Yes    Name of baby's clinic: [facility]    Would you like your baby to have the recommended vitamin K shot to prevent bleeding, Hepatitis B shot, and eye ointment to prevent eye infections?Yes    Next Appointment is: 3 day

## 2024-03-08 NOTE — PROGRESS NOTES
Assessment & Plan  Chrystal Justice is a 28 year old , at 39w6d weeks of pregnancy with KVNG of Mar 9, 2024 by 24w2d Ultrasound.  Patient's history is high risk for the following reasons: Hx of stillbirth, postpartum hemorrage, prior , late prenatal care, short interval between this pregnancy and last  .     # Supervision of high risk pregnancy  - Normal FHT. Patient deferred cervical exam  - She was seen by OB(Dr Ocampo) for TOLAC. Given hx of adequate pelvis int he past, ok with attempting vaginal delivery   - Last US done on 24 @ 37w3d showed EFW 3350 g; 90%. Due to concern for Macrosomia, discussed options to either induce since she is at 39w6d today vs wait to see if goes into labor on her own since KVNG is tomorrow. Patient preferred to go into labor naturally. If she does not go into labor over the weekend , patient was agreeable to follow up in clinic early next week and get an Ultrasound.   -Weight gain adequate: ~20Ibs to date, out of recommended total of 25-35 lbs (pregravid BMI 18.5-24.9)     #Headaches   Patient reported 3 days of headaches with intermittent lightheadedness. Preeclampsia labs done during last visit due to visual changes, LE sweeling and dizziness were within normal. Headache is new for her. Her BP is within normal today and she has trace ankle edema   Will repeat preeclampsia labs     Return to clinic in 3 days    Precepted with Dr. Nallely Toure MD PGY2  [facility]     Subjective  Concerns: Headaches     ROS:  YES - Headache for the past 2-3 days with lightheadedness   No - Changes in vision  No - Chest Pain  No - Shortness of Breath  YES - Nausea   No - Vomiting  No - Abdominal pain   No - Contractions  No - Dysuria   No - Vaginal Discharge    No - Vaginal bleeding   No - Loss of Fluid   No - Extremity swelling   Present - Fetal movement       Going to WIC? Yes    Patient Active Problem List   Diagnosis    Supervision of high  "risk pregnancy, antepartum    Language barrier, cultural differences    History of stillbirth in currently pregnant patient, third trimester    H/O postpartum hemorrhage, currently pregnant    Kidney stone    History of     Keratoconus of both eyes    Myopia of both eyes with astigmatism    Family history of consanguinity    Late prenatal care affecting pregnancy in second trimester    Short interval between pregnancies affecting pregnancy, antepartum    LGA (large for gestational age) fetus affecting management of mother, third trimester, not applicable or unspecified fetus       Chrystal Justice speaks Pushto; Arabic so an  was used today.    Guidance:  post-partum care  signs of labor  pain control during labor    Do you need help getting a car seat? YES  Do you need help getting a breast pump? YES      Objective  /73 (BP Location: Left arm, Patient Position: Sitting, Cuff Size: Adult Regular)   Pulse 91   Temp 98  F (36.7  C) (Oral)   Resp 18   Ht 1.542 m (5' 0.71\")   Wt 66.2 kg (146 lb)   LMP 2023 (Exact Date)   SpO2 93%   Breastfeeding No   BMI 27.85 kg/m    No distress.  Gravid abdomen.  -161.  Fundal height 38 cm.  trace edema.  Cervix:  Deferred     Results  Blood type: AB POS  No results found for any visits on 24.   "

## 2024-03-09 LAB
ALBUMIN SERPL BCG-MCNC: 3.5 G/DL (ref 3.5–5.2)
ALP SERPL-CCNC: 188 U/L (ref 40–150)
ALT SERPL W P-5'-P-CCNC: 17 U/L (ref 0–50)
ANION GAP SERPL CALCULATED.3IONS-SCNC: 8 MMOL/L (ref 7–15)
AST SERPL W P-5'-P-CCNC: 28 U/L (ref 0–45)
BILIRUB SERPL-MCNC: 0.3 MG/DL
BUN SERPL-MCNC: 11.4 MG/DL (ref 6–20)
CALCIUM SERPL-MCNC: 8.7 MG/DL (ref 8.6–10)
CHLORIDE SERPL-SCNC: 104 MMOL/L (ref 98–107)
CREAT SERPL-MCNC: 0.61 MG/DL (ref 0.51–0.95)
DEPRECATED HCO3 PLAS-SCNC: 23 MMOL/L (ref 22–29)
EGFRCR SERPLBLD CKD-EPI 2021: >90 ML/MIN/1.73M2
GLUCOSE SERPL-MCNC: 99 MG/DL (ref 70–99)
POTASSIUM SERPL-SCNC: 3.9 MMOL/L (ref 3.4–5.3)
PROT SERPL-MCNC: 6.2 G/DL (ref 6.4–8.3)
SODIUM SERPL-SCNC: 135 MMOL/L (ref 135–145)

## 2024-03-11 ENCOUNTER — OFFICE VISIT (OUTPATIENT)
Dept: FAMILY MEDICINE | Facility: CLINIC | Age: 29
End: 2024-03-11
Payer: COMMERCIAL

## 2024-03-11 ENCOUNTER — ANCILLARY PROCEDURE (OUTPATIENT)
Dept: ULTRASOUND IMAGING | Facility: CLINIC | Age: 29
End: 2024-03-11
Attending: FAMILY MEDICINE
Payer: COMMERCIAL

## 2024-03-11 VITALS
OXYGEN SATURATION: 95 % | SYSTOLIC BLOOD PRESSURE: 102 MMHG | RESPIRATION RATE: 20 BRPM | WEIGHT: 148.2 LBS | BODY MASS INDEX: 27.98 KG/M2 | DIASTOLIC BLOOD PRESSURE: 69 MMHG | HEIGHT: 61 IN | HEART RATE: 82 BPM | TEMPERATURE: 98.2 F

## 2024-03-11 DIAGNOSIS — O09.90 SUPERVISION OF HIGH RISK PREGNANCY, ANTEPARTUM: ICD-10-CM

## 2024-03-11 DIAGNOSIS — O09.90 SUPERVISION OF HIGH RISK PREGNANCY, ANTEPARTUM: Primary | ICD-10-CM

## 2024-03-11 PROCEDURE — 76819 FETAL BIOPHYS PROFIL W/O NST: CPT | Mod: TC | Performed by: STUDENT IN AN ORGANIZED HEALTH CARE EDUCATION/TRAINING PROGRAM

## 2024-03-11 PROCEDURE — 76816 OB US FOLLOW-UP PER FETUS: CPT | Mod: TC | Performed by: STUDENT IN AN ORGANIZED HEALTH CARE EDUCATION/TRAINING PROGRAM

## 2024-03-11 PROCEDURE — 59426 ANTEPARTUM CARE ONLY: CPT | Mod: GC | Performed by: STUDENT IN AN ORGANIZED HEALTH CARE EDUCATION/TRAINING PROGRAM

## 2024-03-11 NOTE — NURSING NOTE
Due to patient being non-English speaking/uses sign language, an  was used for this visit. Only for face-to-face interpretation by an external agency, date and length of interpretation can be found on the scanned worksheet.     name: steve @ [phone]   Agency: [employer]  Language:  Ade    Telephone number: [phone]  Type of interpretation: Telephone, spoken

## 2024-03-11 NOTE — PATIENT INSTRUCTIONS
"Delivery Plan   I NEED A Female [place]      Take me to: [facility]  Phone number: [phone]  Address:  [address], [address] phone number: [phone]    My name is: Chrystal Justice  : 1995    My Doctor is: Yasmin Dhaliwal MD   Attending Physician: [facility]  Prenatal care was at [facility] [phone].    28 year old         -para:   Estimated Date of Delivery: Mar 9, 2024  At 40w2d on 2024  Delivery type: Vaginal Delivery    Patient Active Problem List   Diagnosis    Supervision of high risk pregnancy, antepartum    Language barrier, cultural differences    History of stillbirth in currently pregnant patient, third trimester    H/O postpartum hemorrhage, currently pregnant    Kidney stone    History of     Keratoconus of both eyes    Myopia of both eyes with astigmatism    Family history of consanguinity    Late prenatal care affecting pregnancy in second trimester    Short interval between pregnancies affecting pregnancy, antepartum    LGA (large for gestational age) fetus affecting management of mother, third trimester, not applicable or unspecified fetus     Allergies:No Known Allergies    Blood Type: AB POS  GBS:negative Date completed: 24    No results found for: \"N9LK\"  No results found for: \"LCN7\"  No results found for: \"N5UV\"  No results found for: \"N9LF\"  Hemoglobin   Date Value Ref Range Status   2024 11.2 (L) 11.7 - 15.7 g/dL Final   2024 11.4 (L) 11.7 - 15.7 g/dL Final       Last cervical check: 2024 Cervical Dilation: Deferred, Effacement:  Deferred    Immunization History   Administered Date(s) Administered    COVID-19 Vaccine (Priscila) 2021    Influenza Vaccine >6 months,quad, PF 02/15/2023, 2023    MMR 2021    Poliovirus, inactivated (IPV) 2021    RSV Vaccine (Abrysvo) 2024    TDAP " (Adacel,Boostrix) 09/05/2021, 09/26/2022, 01/08/2024    Varicella 09/05/2021       Who will be present at the delivery?      What are your plans for pain control? Epidural      Who will cut the umbilical Cord? Provider      Do you plan to breastfeed?Yes     If your baby is a boy, would you like him circumcised?Yes     Name of baby's clinic: [facility]     Would you like your baby to have the recommended vitamin K shot to prevent bleeding, Hepatitis B shot, and eye ointment to prevent eye infections?Yes     Next Appointment is: 1 week

## 2024-03-11 NOTE — PROGRESS NOTES
Assessment & Plan  Chrystal Justice is a 28 year old , at 40w2d weeks of pregnancy with KVNG of Mar 9, 2024 by 24w2d Ultrasound.  Patient's history is high risk for the following reasons: Hx of stillbirth, postpartum hemorrage, prior , late prenatal care, short interval between this pregnancy and last  .      Of note. Patient requesting a female provider only. Discussed with patient that we would work in getting her a female provider if she ends up having a vaginal delivery. However, if she ends up getting a  then it could be either a male or a female provider. Patient expressed understanding.       # Supervision of high risk pregnancy  -FHT was within normal  -She was seen by OB(Dr Ocampo) in consultation for TOLAC. Given hx of adequate pelvis int he past, ok with attempting vaginal delivery   -Last US done on 24 @ 37w3d showed EFW 3350 g; 90%. Due to concern for Macrosomia, discussed options to either induce since she is at 39w6d today vs wait to see if goes into labor on her own since KVNG is 3/9/24. Patient preferred to go into labor naturally.  -Ultrasound today showed  Estimated Fetal Weight: 4362g, 93%  -Revisited option to induce today. Patient again preferred to go into labor naturally for now.  However, she would like to be scheduled for induction this Thursday.   -Induction scheduled for 3/14/24 at 3pm. Avoid cervidil or dinoprostone given history of .  -Weight gain adequate: ~22Ibs to date, out of recommended total of 25-35 lbs (pregravid BMI 18.5-24.9)      Return to clinic in 1-2 week.    Precepted with Dr. Sobia Toure MD PGY2  [facility]       Subjective  Concerns: No     ROS:  No - Headache  No - Changes in vision  No - Chest Pain  No - Shortness of Breath  No - Nausea   No - Vomiting  No - Abdominal pain   No - Contractions  No - Dysuria   No - Vaginal Discharge    No - Vaginal bleeding   No - Loss of Fluid   YES - Extremity  "swelling   Present - Fetal movement       Going to WIC? Yes    Patient Active Problem List   Diagnosis    Supervision of high risk pregnancy, antepartum    Language barrier, cultural differences    History of stillbirth in currently pregnant patient, third trimester    H/O postpartum hemorrhage, currently pregnant    Kidney stone    History of     Keratoconus of both eyes    Myopia of both eyes with astigmatism    Family history of consanguinity    Late prenatal care affecting pregnancy in second trimester    Short interval between pregnancies affecting pregnancy, antepartum    LGA (large for gestational age) fetus affecting management of mother, third trimester, not applicable or unspecified fetus       Chrystal Justice speaks Pushto; Chinese so an  was used today.    Guidance:  post-partum care  signs of labor  pain control during labor    Do you need help getting a car seat? YES  Do you need help getting a breast pump? YES      Objective  /69 (BP Location: Left arm, Patient Position: Sitting, Cuff Size: Adult Regular)   Pulse 82   Temp 98.2  F (36.8  C) (Oral)   Resp 20   Ht 1.54 m (5' 0.63\")   Wt 67.2 kg (148 lb 3.2 oz)   LMP 2023 (Exact Date)   SpO2 95%   BMI 28.34 kg/m    No distress.  Gravid abdomen.  -134.  Fundal height 39 cm.  trace edema.  Cervix: not examined, patient declined     Results  Blood type: AB POS  No results found for any visits on 24.   "

## 2024-03-12 ENCOUNTER — HOSPITAL ENCOUNTER (INPATIENT)
Facility: CLINIC | Age: 29
LOS: 2 days | Discharge: HOME HEALTH | End: 2024-03-14
Attending: FAMILY MEDICINE | Admitting: STUDENT IN AN ORGANIZED HEALTH CARE EDUCATION/TRAINING PROGRAM
Payer: COMMERCIAL

## 2024-03-12 PROBLEM — Z36.89 ENCOUNTER FOR TRIAGE IN PREGNANT PATIENT: Status: ACTIVE | Noted: 2024-03-12

## 2024-03-12 PROBLEM — Z34.90 PREGNANCY: Status: ACTIVE | Noted: 2024-03-12

## 2024-03-12 LAB
ABO/RH(D): NORMAL
ANTIBODY SCREEN: NEGATIVE
SPECIMEN EXPIRATION DATE: NORMAL

## 2024-03-12 PROCEDURE — 120N000001 HC R&B MED SURG/OB

## 2024-03-12 PROCEDURE — 99207 PR NO CHARGE LOS: CPT | Performed by: FAMILY MEDICINE

## 2024-03-12 PROCEDURE — 250N000011 HC RX IP 250 OP 636

## 2024-03-12 RX ORDER — FENTANYL CITRATE 50 UG/ML
50 INJECTION, SOLUTION INTRAMUSCULAR; INTRAVENOUS EVERY 30 MIN PRN
Status: DISCONTINUED | OUTPATIENT
Start: 2024-03-12 | End: 2024-03-13 | Stop reason: HOSPADM

## 2024-03-12 RX ORDER — LIDOCAINE 40 MG/G
CREAM TOPICAL
Status: DISCONTINUED | OUTPATIENT
Start: 2024-03-12 | End: 2024-03-12 | Stop reason: HOSPADM

## 2024-03-12 RX ORDER — KETOROLAC TROMETHAMINE 30 MG/ML
30 INJECTION, SOLUTION INTRAMUSCULAR; INTRAVENOUS
Status: DISCONTINUED | OUTPATIENT
Start: 2024-03-12 | End: 2024-03-14 | Stop reason: HOSPADM

## 2024-03-12 RX ORDER — PROCHLORPERAZINE 25 MG
25 SUPPOSITORY, RECTAL RECTAL EVERY 12 HOURS PRN
Status: DISCONTINUED | OUTPATIENT
Start: 2024-03-12 | End: 2024-03-13 | Stop reason: HOSPADM

## 2024-03-12 RX ORDER — LOPERAMIDE HCL 2 MG
4 CAPSULE ORAL
Status: DISCONTINUED | OUTPATIENT
Start: 2024-03-12 | End: 2024-03-13 | Stop reason: HOSPADM

## 2024-03-12 RX ORDER — NALOXONE HYDROCHLORIDE 0.4 MG/ML
0.2 INJECTION, SOLUTION INTRAMUSCULAR; INTRAVENOUS; SUBCUTANEOUS
Status: DISCONTINUED | OUTPATIENT
Start: 2024-03-12 | End: 2024-03-13 | Stop reason: HOSPADM

## 2024-03-12 RX ORDER — OXYTOCIN 10 [USP'U]/ML
10 INJECTION, SOLUTION INTRAMUSCULAR; INTRAVENOUS
Status: DISCONTINUED | OUTPATIENT
Start: 2024-03-12 | End: 2024-03-14 | Stop reason: HOSPADM

## 2024-03-12 RX ORDER — MISOPROSTOL 200 UG/1
400 TABLET ORAL
Status: DISCONTINUED | OUTPATIENT
Start: 2024-03-12 | End: 2024-03-13 | Stop reason: HOSPADM

## 2024-03-12 RX ORDER — LOPERAMIDE HCL 2 MG
2 CAPSULE ORAL
Status: DISCONTINUED | OUTPATIENT
Start: 2024-03-12 | End: 2024-03-13 | Stop reason: HOSPADM

## 2024-03-12 RX ORDER — METOCLOPRAMIDE 10 MG/1
10 TABLET ORAL EVERY 6 HOURS PRN
Status: DISCONTINUED | OUTPATIENT
Start: 2024-03-12 | End: 2024-03-13 | Stop reason: HOSPADM

## 2024-03-12 RX ORDER — OXYTOCIN/0.9 % SODIUM CHLORIDE 30/500 ML
340 PLASTIC BAG, INJECTION (ML) INTRAVENOUS CONTINUOUS PRN
Status: DISCONTINUED | OUTPATIENT
Start: 2024-03-12 | End: 2024-03-13 | Stop reason: HOSPADM

## 2024-03-12 RX ORDER — CITRIC ACID/SODIUM CITRATE 334-500MG
30 SOLUTION, ORAL ORAL
Status: DISCONTINUED | OUTPATIENT
Start: 2024-03-12 | End: 2024-03-13 | Stop reason: HOSPADM

## 2024-03-12 RX ORDER — NALOXONE HYDROCHLORIDE 0.4 MG/ML
0.4 INJECTION, SOLUTION INTRAMUSCULAR; INTRAVENOUS; SUBCUTANEOUS
Status: DISCONTINUED | OUTPATIENT
Start: 2024-03-12 | End: 2024-03-13 | Stop reason: HOSPADM

## 2024-03-12 RX ORDER — METHYLERGONOVINE MALEATE 0.2 MG/ML
200 INJECTION INTRAVENOUS
Status: DISCONTINUED | OUTPATIENT
Start: 2024-03-12 | End: 2024-03-13 | Stop reason: HOSPADM

## 2024-03-12 RX ORDER — OXYTOCIN 10 [USP'U]/ML
10 INJECTION, SOLUTION INTRAMUSCULAR; INTRAVENOUS
Status: DISCONTINUED | OUTPATIENT
Start: 2024-03-12 | End: 2024-03-13 | Stop reason: HOSPADM

## 2024-03-12 RX ORDER — CARBOPROST TROMETHAMINE 250 UG/ML
250 INJECTION, SOLUTION INTRAMUSCULAR
Status: DISCONTINUED | OUTPATIENT
Start: 2024-03-12 | End: 2024-03-13 | Stop reason: HOSPADM

## 2024-03-12 RX ORDER — PROCHLORPERAZINE MALEATE 10 MG
10 TABLET ORAL EVERY 6 HOURS PRN
Status: DISCONTINUED | OUTPATIENT
Start: 2024-03-12 | End: 2024-03-13 | Stop reason: HOSPADM

## 2024-03-12 RX ORDER — TRANEXAMIC ACID 10 MG/ML
1 INJECTION, SOLUTION INTRAVENOUS EVERY 30 MIN PRN
Status: DISCONTINUED | OUTPATIENT
Start: 2024-03-12 | End: 2024-03-13 | Stop reason: HOSPADM

## 2024-03-12 RX ORDER — ONDANSETRON 2 MG/ML
4 INJECTION INTRAMUSCULAR; INTRAVENOUS EVERY 6 HOURS PRN
Status: DISCONTINUED | OUTPATIENT
Start: 2024-03-12 | End: 2024-03-13 | Stop reason: HOSPADM

## 2024-03-12 RX ORDER — ONDANSETRON 4 MG/1
4 TABLET, ORALLY DISINTEGRATING ORAL EVERY 6 HOURS PRN
Status: DISCONTINUED | OUTPATIENT
Start: 2024-03-12 | End: 2024-03-13 | Stop reason: HOSPADM

## 2024-03-12 RX ORDER — IBUPROFEN 800 MG/1
800 TABLET, FILM COATED ORAL
Status: DISCONTINUED | OUTPATIENT
Start: 2024-03-12 | End: 2024-03-14 | Stop reason: HOSPADM

## 2024-03-12 RX ORDER — MISOPROSTOL 200 UG/1
800 TABLET ORAL
Status: DISCONTINUED | OUTPATIENT
Start: 2024-03-12 | End: 2024-03-13 | Stop reason: HOSPADM

## 2024-03-12 RX ORDER — METOCLOPRAMIDE HYDROCHLORIDE 5 MG/ML
10 INJECTION INTRAMUSCULAR; INTRAVENOUS EVERY 6 HOURS PRN
Status: DISCONTINUED | OUTPATIENT
Start: 2024-03-12 | End: 2024-03-13 | Stop reason: HOSPADM

## 2024-03-12 RX ORDER — OXYTOCIN/0.9 % SODIUM CHLORIDE 30/500 ML
100-340 PLASTIC BAG, INJECTION (ML) INTRAVENOUS CONTINUOUS PRN
Status: DISCONTINUED | OUTPATIENT
Start: 2024-03-12 | End: 2024-03-14 | Stop reason: HOSPADM

## 2024-03-12 RX ORDER — ACETAMINOPHEN 325 MG/1
650 TABLET ORAL EVERY 4 HOURS PRN
Status: DISCONTINUED | OUTPATIENT
Start: 2024-03-12 | End: 2024-03-13 | Stop reason: HOSPADM

## 2024-03-12 RX ADMIN — FENTANYL CITRATE 50 MCG: 50 INJECTION, SOLUTION INTRAMUSCULAR; INTRAVENOUS at 23:52

## 2024-03-12 ASSESSMENT — ACTIVITIES OF DAILY LIVING (ADL)
ADLS_ACUITY_SCORE: 20
ADLS_ACUITY_SCORE: 20
ADLS_ACUITY_SCORE: 35

## 2024-03-12 NOTE — LETTER
2024      Chrystal Justice  [address]  [place] [address]        To whom it may concern:    Jam Justice's wife recently delivered a healthy baby boy. In order to properly care for both his wife and new baby, Jam will require time off from work. 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 Please provide him with paperwork for paternity leave or FMLA per your company policy. He would like between 3-4 weeks to help during the  period.     I have cared for this family through their past two pregnancies, and know this would be a great help to both his wife and new child. If you have further questions or need to fax forms to our clinic ([facility]), please call at [phone] or fax forms to [phone].       Sincerely,    Yasmin Dhaliwal MD

## 2024-03-13 ENCOUNTER — ANESTHESIA (OUTPATIENT)
Dept: OBGYN | Facility: CLINIC | Age: 29
End: 2024-03-13
Payer: COMMERCIAL

## 2024-03-13 ENCOUNTER — ANESTHESIA EVENT (OUTPATIENT)
Dept: OBGYN | Facility: CLINIC | Age: 29
End: 2024-03-13
Payer: COMMERCIAL

## 2024-03-13 LAB
HGB BLD-MCNC: 10.1 G/DL (ref 11.7–15.7)
HGB BLD-MCNC: 10.8 G/DL (ref 11.7–15.7)

## 2024-03-13 PROCEDURE — 722N000001 HC LABOR CARE VAGINAL DELIVERY SINGLE

## 2024-03-13 PROCEDURE — 00HU33Z INSERTION OF INFUSION DEVICE INTO SPINAL CANAL, PERCUTANEOUS APPROACH: ICD-10-PCS | Performed by: ANESTHESIOLOGY

## 2024-03-13 PROCEDURE — 250N000011 HC RX IP 250 OP 636

## 2024-03-13 PROCEDURE — 3E0R3BZ INTRODUCTION OF ANESTHETIC AGENT INTO SPINAL CANAL, PERCUTANEOUS APPROACH: ICD-10-PCS | Performed by: ANESTHESIOLOGY

## 2024-03-13 PROCEDURE — 250N000013 HC RX MED GY IP 250 OP 250 PS 637

## 2024-03-13 PROCEDURE — 250N000009 HC RX 250: Performed by: ANESTHESIOLOGY

## 2024-03-13 PROCEDURE — 250N000011 HC RX IP 250 OP 636: Performed by: ANESTHESIOLOGY

## 2024-03-13 PROCEDURE — 36415 COLL VENOUS BLD VENIPUNCTURE: CPT

## 2024-03-13 PROCEDURE — 999N000016 HC STATISTIC ATTENDANCE AT DELIVERY

## 2024-03-13 PROCEDURE — 86900 BLOOD TYPING SEROLOGIC ABO: CPT

## 2024-03-13 PROCEDURE — 85018 HEMOGLOBIN: CPT

## 2024-03-13 PROCEDURE — 258N000003 HC RX IP 258 OP 636

## 2024-03-13 PROCEDURE — 120N000001 HC R&B MED SURG/OB

## 2024-03-13 PROCEDURE — 370N000003 HC ANESTHESIA WARD SERVICE: Performed by: ANESTHESIOLOGY

## 2024-03-13 RX ORDER — METHYLERGONOVINE MALEATE 0.2 MG/ML
200 INJECTION INTRAVENOUS
Status: DISCONTINUED | OUTPATIENT
Start: 2024-03-13 | End: 2024-03-14 | Stop reason: HOSPADM

## 2024-03-13 RX ORDER — ACETAMINOPHEN 325 MG/1
650 TABLET ORAL EVERY 4 HOURS PRN
Status: DISCONTINUED | OUTPATIENT
Start: 2024-03-13 | End: 2024-03-14 | Stop reason: HOSPADM

## 2024-03-13 RX ORDER — OXYTOCIN 10 [USP'U]/ML
10 INJECTION, SOLUTION INTRAMUSCULAR; INTRAVENOUS
Status: DISCONTINUED | OUTPATIENT
Start: 2024-03-13 | End: 2024-03-14 | Stop reason: HOSPADM

## 2024-03-13 RX ORDER — ONDANSETRON 4 MG/1
4 TABLET, ORALLY DISINTEGRATING ORAL EVERY 6 HOURS PRN
Status: DISCONTINUED | OUTPATIENT
Start: 2024-03-13 | End: 2024-03-13 | Stop reason: HOSPADM

## 2024-03-13 RX ORDER — FENTANYL/ROPIVACAINE/NS/PF 2MCG/ML-.1
PLASTIC BAG, INJECTION (ML) EPIDURAL
Status: DISCONTINUED | OUTPATIENT
Start: 2024-03-13 | End: 2024-03-13 | Stop reason: HOSPADM

## 2024-03-13 RX ORDER — FENTANYL CITRATE-0.9 % NACL/PF 10 MCG/ML
100 PLASTIC BAG, INJECTION (ML) INTRAVENOUS EVERY 5 MIN PRN
Status: DISCONTINUED | OUTPATIENT
Start: 2024-03-13 | End: 2024-03-13 | Stop reason: HOSPADM

## 2024-03-13 RX ORDER — MISOPROSTOL 200 UG/1
400 TABLET ORAL
Status: DISCONTINUED | OUTPATIENT
Start: 2024-03-13 | End: 2024-03-14 | Stop reason: HOSPADM

## 2024-03-13 RX ORDER — MISOPROSTOL 200 UG/1
800 TABLET ORAL
Status: DISCONTINUED | OUTPATIENT
Start: 2024-03-13 | End: 2024-03-14 | Stop reason: HOSPADM

## 2024-03-13 RX ORDER — LOPERAMIDE HCL 2 MG
4 CAPSULE ORAL
Status: DISCONTINUED | OUTPATIENT
Start: 2024-03-13 | End: 2024-03-14 | Stop reason: HOSPADM

## 2024-03-13 RX ORDER — NALBUPHINE HYDROCHLORIDE 20 MG/ML
2.5-5 INJECTION, SOLUTION INTRAMUSCULAR; INTRAVENOUS; SUBCUTANEOUS EVERY 6 HOURS PRN
Status: DISCONTINUED | OUTPATIENT
Start: 2024-03-13 | End: 2024-03-14 | Stop reason: HOSPADM

## 2024-03-13 RX ORDER — LIDOCAINE HYDROCHLORIDE 10 MG/ML
INJECTION, SOLUTION EPIDURAL; INFILTRATION; INTRACAUDAL; PERINEURAL PRN
Status: DISCONTINUED | OUTPATIENT
Start: 2024-03-13 | End: 2024-03-13

## 2024-03-13 RX ORDER — ONDANSETRON 2 MG/ML
4 INJECTION INTRAMUSCULAR; INTRAVENOUS EVERY 6 HOURS PRN
Status: DISCONTINUED | OUTPATIENT
Start: 2024-03-13 | End: 2024-03-13 | Stop reason: HOSPADM

## 2024-03-13 RX ORDER — CARBOPROST TROMETHAMINE 250 UG/ML
250 INJECTION, SOLUTION INTRAMUSCULAR
Status: DISCONTINUED | OUTPATIENT
Start: 2024-03-13 | End: 2024-03-14 | Stop reason: HOSPADM

## 2024-03-13 RX ORDER — DOCUSATE SODIUM 100 MG/1
100 CAPSULE, LIQUID FILLED ORAL DAILY
Status: DISCONTINUED | OUTPATIENT
Start: 2024-03-13 | End: 2024-03-14 | Stop reason: HOSPADM

## 2024-03-13 RX ORDER — MODIFIED LANOLIN
OINTMENT (GRAM) TOPICAL
Status: DISCONTINUED | OUTPATIENT
Start: 2024-03-13 | End: 2024-03-14 | Stop reason: HOSPADM

## 2024-03-13 RX ORDER — BISACODYL 10 MG
10 SUPPOSITORY, RECTAL RECTAL DAILY PRN
Status: DISCONTINUED | OUTPATIENT
Start: 2024-03-13 | End: 2024-03-14 | Stop reason: HOSPADM

## 2024-03-13 RX ORDER — SODIUM CHLORIDE, SODIUM LACTATE, POTASSIUM CHLORIDE, CALCIUM CHLORIDE 600; 310; 30; 20 MG/100ML; MG/100ML; MG/100ML; MG/100ML
INJECTION, SOLUTION INTRAVENOUS CONTINUOUS
Status: DISCONTINUED | OUTPATIENT
Start: 2024-03-13 | End: 2024-03-14 | Stop reason: HOSPADM

## 2024-03-13 RX ORDER — KETOROLAC TROMETHAMINE 30 MG/ML
30 INJECTION, SOLUTION INTRAMUSCULAR; INTRAVENOUS ONCE
Status: COMPLETED | OUTPATIENT
Start: 2024-03-13 | End: 2024-03-13

## 2024-03-13 RX ORDER — HYDROCORTISONE 25 MG/G
CREAM TOPICAL 3 TIMES DAILY PRN
Status: DISCONTINUED | OUTPATIENT
Start: 2024-03-13 | End: 2024-03-14 | Stop reason: HOSPADM

## 2024-03-13 RX ORDER — TRANEXAMIC ACID 10 MG/ML
1 INJECTION, SOLUTION INTRAVENOUS EVERY 30 MIN PRN
Status: DISCONTINUED | OUTPATIENT
Start: 2024-03-13 | End: 2024-03-14 | Stop reason: HOSPADM

## 2024-03-13 RX ORDER — LIDOCAINE HYDROCHLORIDE AND EPINEPHRINE 15; 5 MG/ML; UG/ML
INJECTION, SOLUTION EPIDURAL PRN
Status: DISCONTINUED | OUTPATIENT
Start: 2024-03-13 | End: 2024-03-13

## 2024-03-13 RX ORDER — LOPERAMIDE HCL 2 MG
2 CAPSULE ORAL
Status: DISCONTINUED | OUTPATIENT
Start: 2024-03-13 | End: 2024-03-14 | Stop reason: HOSPADM

## 2024-03-13 RX ORDER — IBUPROFEN 800 MG/1
800 TABLET, FILM COATED ORAL EVERY 6 HOURS PRN
Status: DISCONTINUED | OUTPATIENT
Start: 2024-03-13 | End: 2024-03-14 | Stop reason: HOSPADM

## 2024-03-13 RX ORDER — OXYTOCIN/0.9 % SODIUM CHLORIDE 30/500 ML
340 PLASTIC BAG, INJECTION (ML) INTRAVENOUS CONTINUOUS PRN
Status: DISCONTINUED | OUTPATIENT
Start: 2024-03-13 | End: 2024-03-14 | Stop reason: HOSPADM

## 2024-03-13 RX ADMIN — KETOROLAC TROMETHAMINE 30 MG: 30 INJECTION, SOLUTION INTRAMUSCULAR at 20:41

## 2024-03-13 RX ADMIN — LIDOCAINE HYDROCHLORIDE,EPINEPHRINE BITARTRATE 2 ML: 15; .005 INJECTION, SOLUTION EPIDURAL; INFILTRATION; INTRACAUDAL; PERINEURAL at 01:35

## 2024-03-13 RX ADMIN — Medication 100 MCG: at 02:27

## 2024-03-13 RX ADMIN — IBUPROFEN 800 MG: 800 TABLET ORAL at 18:08

## 2024-03-13 RX ADMIN — LIDOCAINE HYDROCHLORIDE 3 ML: 10 INJECTION, SOLUTION EPIDURAL; INFILTRATION; INTRACAUDAL; PERINEURAL at 01:36

## 2024-03-13 RX ADMIN — Medication: at 01:32

## 2024-03-13 RX ADMIN — NALBUPHINE HYDROCHLORIDE 2.6 MG: 20 INJECTION, SOLUTION INTRAMUSCULAR; INTRAVENOUS; SUBCUTANEOUS at 05:44

## 2024-03-13 RX ADMIN — IBUPROFEN 800 MG: 800 TABLET ORAL at 11:58

## 2024-03-13 RX ADMIN — SODIUM CHLORIDE, POTASSIUM CHLORIDE, SODIUM LACTATE AND CALCIUM CHLORIDE 1000 ML: 600; 310; 30; 20 INJECTION, SOLUTION INTRAVENOUS at 00:14

## 2024-03-13 RX ADMIN — ACETAMINOPHEN 650 MG: 325 TABLET ORAL at 18:08

## 2024-03-13 RX ADMIN — DOCUSATE SODIUM 100 MG: 100 CAPSULE, LIQUID FILLED ORAL at 09:00

## 2024-03-13 RX ADMIN — ACETAMINOPHEN 650 MG: 325 TABLET ORAL at 14:10

## 2024-03-13 RX ADMIN — LIDOCAINE HYDROCHLORIDE,EPINEPHRINE BITARTRATE 3 ML: 15; .005 INJECTION, SOLUTION EPIDURAL; INFILTRATION; INTRACAUDAL; PERINEURAL at 01:31

## 2024-03-13 RX ADMIN — KETOROLAC TROMETHAMINE 30 MG: 30 INJECTION, SOLUTION INTRAMUSCULAR at 05:29

## 2024-03-13 RX ADMIN — ACETAMINOPHEN 650 MG: 325 TABLET ORAL at 21:48

## 2024-03-13 RX ADMIN — ACETAMINOPHEN 650 MG: 325 TABLET ORAL at 09:00

## 2024-03-13 ASSESSMENT — ACTIVITIES OF DAILY LIVING (ADL)
ADLS_ACUITY_SCORE: 20
ADLS_ACUITY_SCORE: 22
ADLS_ACUITY_SCORE: 20
ADLS_ACUITY_SCORE: 20
ADLS_ACUITY_SCORE: 22
ADLS_ACUITY_SCORE: 20
ADLS_ACUITY_SCORE: 22
ADLS_ACUITY_SCORE: 20
ADLS_ACUITY_SCORE: 22
ADLS_ACUITY_SCORE: 20
ADLS_ACUITY_SCORE: 22
ADLS_ACUITY_SCORE: 20
ADLS_ACUITY_SCORE: 22
ADLS_ACUITY_SCORE: 20

## 2024-03-13 NOTE — ANESTHESIA PROCEDURE NOTES
"Epidural catheter Procedure Note    Pre-Procedure   Staff -        Anesthesiologist:  Anish Sánchez MD       Performed By: anesthesiologist       Location: OB       Procedure Start/Stop Times: 3/13/2024 1:17 AM and 3/13/2024 1:37 AM       Pre-Anesthestic Checklist: patient identified, IV checked, risks and benefits discussed, informed consent, monitors and equipment checked and pre-op evaluation  Timeout:       Correct Patient: Yes        Correct Procedure: Yes        Correct Site: Yes        Correct Position: Yes   Procedure Documentation  Procedure: epidural catheter       Patient Position: sitting       Patient Prep/Sterile Barriers: sterile gloves, mask, patient draped       Skin prep: Chloraprep       Local skin infiltrated with 2 mL of 1% lidocaine.        Insertion Site: L3-4. (midline approach).       Technique: LORT air        Needle Type: [facility] [employer]       Needle Gauge: 18.        Needle Length (Inches): 3.5        Catheter: 20 G.          Catheter threaded easily.           Threaded 10 cm at skin.         # of attempts: 1 and  # of redirects:  0    Assessment/Narrative         Paresthesias: No.       Test dose of 3 mL lidocaine 1.5% w/ 1:200,000 epinephrine at 01:31 CDT.         Test dose negative, 3 minutes after injection, for signs of intravascular, subdural, or intrathecal injection.       Insertion/Infusion Method: LORT air       Aspiration negative for Heme or CSF via Epidural Catheter.    Medication(s) Administered   Medication Administration Time: 3/13/2024 1:17 AM      FOR [facility] ([facility] [place] [place]) ONLY:   Pain Team Contact information: please page the Pain Team Via [email or web address]. Search \"Pain\". During daytime hours, please page the attending first. At night please page the resident first.      "

## 2024-03-13 NOTE — PROGRESS NOTES
"Data: Patient presented to [facility]: 3/12/2024  8:58 PM.  Reason for maternal/fetal assessment is uterine contractions. Patient reports through  that she has been having contractions \"for a few days, but they have become stronger  since this afternoon. Patient denies leaking of vaginal fluid/rupture of membranes, pelvic pressure, nausea, vomiting, headache, visual disturbances, epigastric or RUQ pain, significant edema. Patient reports fetal movement is normal. Patient is a 40w3d . Prenatal record reviewed. Pregnancy has been complicated by desires TOLAC . Support person is present.     Patient reports pain and is coping.     Action: Verbal consent for EFM. Triage assessment completed. Patient may meet criteria for early labor discharge.     Response: Patient verbalized understanding of triage assessment. BFMATRHA and Dr Andrew update with assessment and consideration of early labor discharge vs admission.            "

## 2024-03-13 NOTE — L&D DELIVERY NOTE
OB Vaginal Delivery Note    Chrystal Justice MRN# [id number]   Age: 28 year old YOB: 1995       GA: 40w4d  GP:   Labor Complications: None   EBL:  200 mL  Delivery QBL:  400ml  Delivery Type: Vaginal, Spontaneous   ROM to Delivery Time: (Delivered) Minutes: 35   Weight: 4.19 kg (9 lb 3.8 oz)    1 Minute 5 Minute 10 Minute   Apgar Totals: 7   9        YOANNA HOWE;NAVDEEP ZAPATA     Delivery Details:  Chrystal Justice, a 28 year old  female delivered a viable infant with apgars of 7  and 9 . Patient was fully dilated and pushing after  3 hours  30 minutes in active labor. Delivery was via vaginal, spontaneous  to a sterile field under epidural epidural anesthesia. Infant delivered in vertex  left  occiput  anterior  position. Anterior and posterior shoulders delivered without difficulty. The cord was clamped, cut twice and 3 vessels  were noted. Cord blood was obtained in routine fashion with the following disposition: discard hospital disposition.      Cord complications: none   Placenta delivered at  . Placental disposition was Hospital disposal . Fundal massage performed and fundus found to be firm.     Episiotomy: none    Perineum, vagina, cervix were inspected, and the following lacerations were noted:   Perineal lacerations: none                No lacerations were noted on genital tract inspection. As such, no repair was required.     Excellent hemostasis was noted. Needle count correct. Infant and patient in delivery room in good and stable condition.        Perico Justice [[id number]]      Labor Events     labor?: No   steroids: None  Labor Type: Spontaneous  Predominate monitoring during 1st stage: continuous electronic fetal monitoring     Antibiotics received during labor?: No     Rupture identifier: Sac 1  Rupture date/time: 3/13/24 0234   Rupture type: Spontaneous Rupture of Membranes  Fluid color: Meconium  Fluid odor: Normal    "  Augmentation: None       Delivery/Placenta Date and Time      Delivery Date: 3/13/24 Delivery Time:  3:09 AM      Other personnel present at delivery:  Provider Role   Divina Andrew MD Obstetrician   Yasmin Dhaliwal MD Family Medicine Physician             Apgars    Living status: Living   1 Minute 5 Minute 10 Minute 15 Minute 20 Minute   Skin color: 0  1       Heart rate: 2  2       Reflex irritability: 2  2       Muscle tone: 1  2       Respiratory effort: 2  2       Total: 7  9       Apgars assigned by: MEG THAO RN       Cord      Vessels: 3 Vessels    Cord Complications: None               Cord Blood Disposition: Discard    Gases Sent?: No    Delayed cord clamping?: Yes    Cord Clamping Delay (seconds): 1-30 seconds    Stem cell collection?: No           Foster Resuscitation    Methods: None  Output in Delivery Room: Stool       Foster Measurements      Weight: 9 lb 3.8 oz Length: 1' 8.5\"     Head circumference: 36 cm    Output in delivery room: Stool       Skin to Skin and Feeding Plan      Skin to skin initiation date/time:       Skin to skin end date/time:     Reason skin to skin not initiated: Patient Refused       Labor Events and Shoulder Dystocia    Fetal Tracing Prior to Delivery: Category 2  Fetal Tracing Comments: Variable decelerations with contractions shortly before delivery, recovered in between  Shoulder dystocia present?: Neg       Delivery (Maternal) (Provider to Complete) ([id number])    Episiotomy: None  Perineal lacerations: None    Repair suture: None  Number of repair packets: 0  Genital tract inspection done: Pos       Blood Loss  Mother: Chrystal Justice #[id number]     Start of Mother's Information      Delivery Blood Loss  24 1509 - 24 0342      Delivery QBL (mL) Hospital Encounter 400 mL    Total  400 mL               End of Mother's Information  Mother: Chrystal Jutsice #[id number]                Delivery - Provider to Complete (330587)    Delivery Type (Choose " the 1 that will go to the Birth History): Vaginal, Spontaneous                         Other personnel:  Provider Role   Divina Andrew MD Obstetrician   Yasmin Dhaliwal MD Family Medicine Physician                    Placenta    Removal: Spontaneous  Disposition: Hospital disposal             Anesthesia    Method: Epidural                    Presentation and Position    Presentation: Vertex    Position: Left Occiput Anterior                     Yasmin Dhaliwal MD  PGY3 Family Medicine Resident

## 2024-03-13 NOTE — PROGRESS NOTES
OB Triage Note        Assessment and Plan:     Chrystal Justice is a 28 year old  at 40w3d Membranes are intact.   Patient Active Problem List   Diagnosis    Supervision of high risk pregnancy, antepartum    Language barrier, cultural differences    History of stillbirth in currently pregnant patient, third trimester    H/O postpartum hemorrhage, currently pregnant    Kidney stone    History of     Keratoconus of both eyes    Myopia of both eyes with astigmatism    Family history of consanguinity    Late prenatal care affecting pregnancy in second trimester    Short interval between pregnancies affecting pregnancy, antepartum    LGA (large for gestational age) fetus affecting management of mother, third trimester, not applicable or unspecified fetus    Encounter for triage in pregnant patient     Labor Rule Out  Patient here at 3/70%/-2 was not checked in clinic. Having rare contractions on the monitors. Will recheck after an hour and see if she is in labor or not. Membranes are intact.     Will do full H&P if admitted.     Patient discussed with attending physician, Dr. Pete Espinoza , who agrees with the plan.     ARIANA HAN MD PGY1 3/12/2024  [facility] Residency [facility]       Subjective:     Chrystal Justice is a  28 year old female at 40w3d with a current prenatal history significant for hx of still birth, PPH, prior  with second child for macrosomnia, GBS (-),  who presents to OB triage with contractions and bloody-show.   Chrystal Justice is a patient of Yasmin Matos from [facility].     She reports regular contractions starting at 3-4 days ago that have gotten much stronger today, she is unsure how often they are occurring although they occurring every 8 minutes on the monitor in triage. She denies fluid leakage. She reports bleeding per vagina. Fetal movement is .normal.  Estimated Date of Delivery: Mar 9, 2024 Patient's  "last menstrual period was 2023 (exact date).       Her prenatal course has been complicated by  prior  and previous delivery being 1.5 years ago.     Prenatal labs:   Lab Results   Component Value Date    AS Negative 2023    HEPBANG Nonreactive 2023    CHPCRT Negative 2022    GCPCRT Negative 2022    HGB 11.2 (L) 2024          Review of Systems:   CONSTITUTIONAL: no fatigue, no unexpected change in weight  SKIN: no worrisome rashes or lesions  EYES: no acute vision problems or changes  ENT: no ear problems, no mouth problems, no throat problems  RESP: no significant cough, no shortness of breath  CV: no chest pain, no palpitations, no new or worsening peripheral edema  GI: no nausea, no vomiting, no constipation, no diarrhea  : no frequency, no dysuria, no hematuria  NEURO: no weakness, no dizziness, no headaches  ENDOCRINE: no temperature intolerance, no skin/hair changes         Physical Exam:   Vitals:   /72 (BP Location: Right arm, Patient Position: Semi-Estrada's)   Pulse 80   Temp 98.3  F (36.8  C) (Oral)   Resp 18   LMP 2023 (Exact Date)   0 lbs 0 oz  Estimated body mass index is 28.34 kg/m  as calculated from the following:    Height as of 3/11/24: 1.54 m (5' 0.63\").    Weight as of 3/11/24: 67.2 kg (148 lb 3.2 oz).    Deferred physical exam in triage. See H&P for physical exam.     NST interpretation:  Baseline rate 145 normal  Accelerations present  Decelerations not present  Interpretation: reactive    Labs today:  No results found for any visits on 24.    "

## 2024-03-13 NOTE — ANESTHESIA PREPROCEDURE EVALUATION
Anesthesia Pre-Procedure Evaluation    Patient: Chrystal Justice   MRN: [id number] : 1995        Procedure : * No procedures listed *          Past Medical History:   Diagnosis Date    Anemia due to blood loss, acute 2022    History of blood transfusion     History of  section     History of postpartum hemorrhage     History of pregnancy loss in prior pregnancy, currently pregnant in second trimester     Infection due to 2019 novel coronavirus 2022    Kidney stone       Past Surgical History:   Procedure Laterality Date     SECTION N/A 2022    Procedure:  SECTION;  Surgeon: Daily Mo MD;  Location: [facility] OR      No Known Allergies   Social History     Tobacco Use    Smoking status: Never    Smokeless tobacco: Never   Substance Use Topics    Alcohol use: Never      Wt Readings from Last 1 Encounters:   24 67.2 kg (148 lb 3.2 oz)        Anesthesia Evaluation            ROS/MED HX  ENT/Pulmonary:  - neg pulmonary ROS     Neurologic:  - neg neurologic ROS     Cardiovascular:  - neg cardiovascular ROS     METS/Exercise Tolerance:     Hematologic:  - neg hematologic  ROS     Musculoskeletal:       GI/Hepatic:  - neg GI/hepatic ROS     Renal/Genitourinary:       Endo:  - neg endo ROS     Psychiatric/Substance Use:  - neg psychiatric ROS     Infectious Disease:       Malignancy:       Other:      (+)  , ,previous , TOLAC candidate         Physical Exam    Airway        Mallampati: II   TM distance: > 3 FB   Neck ROM: full   Mouth opening: > 3 cm    Respiratory Devices and Support         Dental  no notable dental history         Cardiovascular   cardiovascular exam normal          Pulmonary   pulmonary exam normal                OUTSIDE LABS:  CBC:   Lab Results   Component Value Date    WBC 7.1 2024    WBC 9.0 2024    HGB 10.8 (L) 2024    HGB 11.2 (L) 2024    HCT 33.7 (L) 2024    HCT 34.0 (L) 2024     " 03/08/2024     03/01/2024     BMP:   Lab Results   Component Value Date     03/08/2024     (L) 04/05/2022    POTASSIUM 3.9 03/08/2024    POTASSIUM 4.0 04/05/2022    CHLORIDE 104 03/08/2024    CHLORIDE 107 04/05/2022    CO2 23 03/08/2024    CO2 19 (L) 04/05/2022    BUN 11.4 03/08/2024    BUN 9 04/05/2022    CR 0.61 03/08/2024    CR 0.55 (L) 04/05/2022    GLC 99 03/08/2024    GLC 79 04/05/2022     COAGS: No results found for: \"PTT\", \"INR\", \"FIBR\"  POC:   Lab Results   Component Value Date    HCG Positive (A) 11/13/2023     HEPATIC:   Lab Results   Component Value Date    ALBUMIN 3.5 03/08/2024    PROTTOTAL 6.2 (L) 03/08/2024    ALT 17 03/08/2024    AST 28 03/08/2024    ALKPHOS 188 (H) 03/08/2024    BILITOTAL 0.3 03/08/2024     OTHER:   Lab Results   Component Value Date    DIOR 8.7 03/08/2024       Anesthesia Plan    ASA Status:  2       Anesthesia Type: Epidural.              Consents    Anesthesia Plan(s) and associated risks, benefits, and realistic alternatives discussed. Questions answered and patient/representative(s) expressed understanding.     - Discussed:     - Discussed with:  Patient            Postoperative Care            Comments:    Other Comments: Patient requests labor analgesia. Chart reviewed, including labs. I discussed the options and risks with thepatient, including but not limited to: bleeding, infection, tissue injury to nerve, muscle or blood vessel, hypotension, headache and epidural failure. All patient questions answered and the consent signed. Patient agrees to elective epidural placement.        neg OB ROS.      Anish Sánchez MD    I have reviewed the pertinent notes and labs in the chart from the past 30 days and (re)examined the patient.  Any updates or changes from those notes are reflected in this note.     # Hyponatremia: Lowest Na = 135 mmol/L in last 30 days, will monitor as appropriate              "

## 2024-03-13 NOTE — PROGRESS NOTES
Labor Progress Note    Assessment/Plan  28 year old  at 40w4d gestational age admitted in labor. Significant prenatal history of prior  for LGA, late prenatal care, short interval pregnancy after , history of PPH, and fetus measuring 93% or EFW. Membranes are intact. GBS status is negative. Category I.     - Continue to monitor FHR  - Anticipate   - Epidural for pain  - PCP, Dr. Dhaliwal, to be at delivery    Subjective  Recheck of cervix /. Patient requesting epidural.     Objective  Vital signs in last 24 hours  Temp:  [98.3  F (36.8  C)] 98.3  F (36.8  C)  Pulse:  [80] 80  Resp:  [18] 18  BP: (115)/(72) 115/72    Physical Exam  General: breathing through contractions  Abd: Gravid  Cervix: 9cm, 90% effaced, 0 station  FHR: Baseline 140/moderate variability/positive accels/no decels; Category 1 tracing  [place]: Contractions Q 3-5 min    Pertinent Labs   Lab Results   Component Value Date    ABORH AB POS 2024    HGB 10.8 2024       Christa Wei MD PGY3 3/13/2024  [facility] Family Medicine Residency Program

## 2024-03-13 NOTE — PROGRESS NOTES
OB Post Partum Note    ASSESSMENT: PLAN:     PPD#0   spontaneous vaginal delivery  Doing well  Routine cares  Anticipate discharge to home in am    SUBJECTIVE:   no complaints, denies fever, chills.  Tolerating po, ambulating.  Baby doing well    OBJECTIVE:  /58 (BP Location: Right arm, Patient Position: Semi-Estrada's, Cuff Size: Adult Regular)   Pulse 102   Temp 99  F (37.2  C)   Resp 16   LMP 05/18/2023 (Exact Date)   SpO2 99%   Breastfeeding Unknown       abd- fundus firm  Ext- no tenderness,   cv- regular rate  Lungs- clear    Divina Ravi MD  [facility]  [phone]

## 2024-03-13 NOTE — PLAN OF CARE
Goal Outcome Evaluation:      Problem: Postpartum (Vaginal Delivery)  Goal: Effective Urinary Elimination  Outcome: Progressing     Vitally stable. Fundus firm, minimal bleeding. Voiding adequately.  utilized throughout shift.

## 2024-03-13 NOTE — PLAN OF CARE
Vitally stable and afebrile. Fundus firm with minimal bleeding. Voiding adequately.     Problem: Labor  Goal: Acceptable Pain Control  Outcome: Progressing     Problem: Postpartum (Vaginal Delivery)  Goal: Effective Urinary Elimination  Outcome: Progressing     Problem: Breastfeeding  Goal: Effective Breastfeeding  Outcome: Progressing

## 2024-03-13 NOTE — ANESTHESIA POSTPROCEDURE EVALUATION
Patient: Chrystal Justice    Procedure: * No procedures listed *       Anesthesia Type:  Epidural    Note:     Postop Pain Control: Uneventful            Sign Out: Well controlled pain   PONV: No   Neuro/Psych: Uneventful            Sign Out: Acceptable/Baseline neuro status   Airway/Respiratory: Uneventful            Sign Out: Acceptable/Baseline resp. status   CV/Hemodynamics: Uneventful            Sign Out: Acceptable CV status; No obvious hypovolemia; No obvious fluid overload   Other NRE: NONE   DID A NON-ROUTINE EVENT OCCUR? No           Last vitals:  Vitals:    03/13/24 0522 03/13/24 0900 03/13/24 1200   BP: 116/58 102/59 116/69   Pulse: 102 72 79   Resp: 16 16 14   Temp: 37.2  C (99  F) 36.8  C (98.3  F) 36.7  C (98.1  F)   SpO2:          Electronically Signed By: Alvarado Chery MD  March 13, 2024  2:38 PM

## 2024-03-14 VITALS
OXYGEN SATURATION: 97 % | HEART RATE: 67 BPM | SYSTOLIC BLOOD PRESSURE: 111 MMHG | RESPIRATION RATE: 16 BRPM | BODY MASS INDEX: 26.11 KG/M2 | DIASTOLIC BLOOD PRESSURE: 74 MMHG | WEIGHT: 136.5 LBS | TEMPERATURE: 97.5 F

## 2024-03-14 PROCEDURE — 99207 PR SUBSEQUENT HOSPITAL CARE FOR MOTHER, 15 MINUTES: CPT | Performed by: FAMILY MEDICINE

## 2024-03-14 PROCEDURE — 250N000013 HC RX MED GY IP 250 OP 250 PS 637

## 2024-03-14 RX ORDER — MODIFIED LANOLIN
OINTMENT (GRAM) TOPICAL
Qty: 49 G | Refills: 2 | Status: SHIPPED | OUTPATIENT
Start: 2024-03-14

## 2024-03-14 RX ORDER — HYDROCORTISONE 25 MG/G
CREAM TOPICAL 3 TIMES DAILY PRN
Qty: 28 G | Refills: 0 | Status: SHIPPED | OUTPATIENT
Start: 2024-03-14

## 2024-03-14 RX ORDER — IBUPROFEN 800 MG/1
800 TABLET, FILM COATED ORAL EVERY 6 HOURS PRN
Qty: 90 TABLET | Refills: 0 | Status: SHIPPED | OUTPATIENT
Start: 2024-03-14 | End: 2024-05-14

## 2024-03-14 RX ORDER — FERROUS SULFATE 325(65) MG
325 TABLET ORAL DAILY
Status: DISCONTINUED | OUTPATIENT
Start: 2024-03-14 | End: 2024-03-14 | Stop reason: HOSPADM

## 2024-03-14 RX ORDER — FERROUS SULFATE 325(65) MG
325 TABLET ORAL DAILY
Qty: 90 TABLET | Refills: 0 | Status: SHIPPED | OUTPATIENT
Start: 2024-03-15

## 2024-03-14 RX ADMIN — FERROUS SULFATE TAB 325 MG (65 MG ELEMENTAL FE) 325 MG: 325 (65 FE) TAB at 09:09

## 2024-03-14 RX ADMIN — ACETAMINOPHEN 650 MG: 325 TABLET ORAL at 04:53

## 2024-03-14 RX ADMIN — ACETAMINOPHEN 650 MG: 325 TABLET ORAL at 09:09

## 2024-03-14 RX ADMIN — IBUPROFEN 800 MG: 800 TABLET ORAL at 04:53

## 2024-03-14 RX ADMIN — DOCUSATE SODIUM 100 MG: 100 CAPSULE, LIQUID FILLED ORAL at 09:09

## 2024-03-14 RX ADMIN — ACETAMINOPHEN 650 MG: 325 TABLET ORAL at 15:12

## 2024-03-14 RX ADMIN — IBUPROFEN 800 MG: 800 TABLET ORAL at 11:57

## 2024-03-14 ASSESSMENT — ACTIVITIES OF DAILY LIVING (ADL)
ADLS_ACUITY_SCORE: 20

## 2024-03-14 NOTE — DISCHARGE SUMMARY
Post-partum Discharge Summary    Chrystal Justice MRN# [id number]   Age: 28 year old YOB: 1995     Date of Admission:  3/12/2024  Date of Discharge::  3/14/2024  Admitting Physician:  Divina Andrew MD  Discharge Physician:  Yasmin Dhaliwal MD and Alvarado Wei MD     Home clinic: [facility]             Admission Diagnoses:   Encounter for triage in pregnant patient [Z36.89]  Pregnancy [Z34.90]          Discharge Diagnosis:     Normal spontaneous vaginal delivery  Intrauterine pregnancy at 40 weeks gestation  Patient Active Problem List   Diagnosis    Supervision of high risk pregnancy, antepartum    Language barrier, cultural differences    History of stillbirth in currently pregnant patient, third trimester    H/O postpartum hemorrhage, currently pregnant    Kidney stone    History of     Keratoconus of both eyes    Myopia of both eyes with astigmatism    Family history of consanguinity    Late prenatal care affecting pregnancy in second trimester    Short interval between pregnancies affecting pregnancy, antepartum    LGA (large for gestational age) fetus affecting management of mother, third trimester, not applicable or unspecified fetus    Encounter for triage in pregnant patient    Pregnancy             Procedures:     Procedure(s): No additional procedures performed       No procedures performed during this admission           Medications Prior to Admission:     Medications Prior to Admission   Medication Sig Dispense Refill Last Dose    acetaminophen (TYLENOL) 500 MG tablet Take 1-2 tablets (500-1,000 mg) by mouth every 6 hours as needed for mild pain 100 tablet 3     clobetasol (TEMOVATE) 0.05 % external ointment Apply topically 2 times daily To hands as needed then transition to protopic 60 g 3     famotidine (PEPCID) 40 MG tablet Take 1 tablet (40 mg) by mouth 2 times daily 60 tablet 3     sodium chloride 0.9 % neb solution 3  mLs by Other route 2 times daily 300 mL 4     tacrolimus (PROTOPIC) 0.1 % external ointment Apply topically 2 times daily To hands as needed (this is not a steroid) 60 g 3     [DISCONTINUED] acetaminophen (TYLENOL) 325 MG tablet Take 1-2 tablets (325-650 mg) by mouth every 6 hours as needed for mild pain 180 tablet 1     [DISCONTINUED] Prenatal Vit-Fe Fumarate-FA (PRENATAL MULTIVITAMIN W/IRON) 27-0.8 MG tablet Take 1 tablet by mouth daily 90 tablet 3              Discharge Medications:     Current Discharge Medication List        START taking these medications    Details   ferrous sulfate (FEROSUL) 325 (65 Fe) MG tablet Take 1 tablet (325 mg) by mouth daily  Qty: 90 tablet, Refills: 0    Associated Diagnoses: Postpartum anemia      hydrocortisone, Perianal, (ANUSOL-HC) 2.5 % cream Place rectally 3 times daily as needed for hemorrhoids  Qty: 28 g, Refills: 0    Associated Diagnoses: Vaginal delivery      ibuprofen (ADVIL/MOTRIN) 800 MG tablet Take 1 tablet (800 mg) by mouth every 6 hours as needed for other (cramping)  Qty: 90 tablet, Refills: 0    Associated Diagnoses: Vaginal delivery      lanolin ointment Apply topically every hour as needed for other (sore nipples)  Qty: 49 g, Refills: 2    Associated Diagnoses: Lactating mother           CONTINUE these medications which have NOT CHANGED    Details   acetaminophen (TYLENOL) 500 MG tablet Take 1-2 tablets (500-1,000 mg) by mouth every 6 hours as needed for mild pain  Qty: 100 tablet, Refills: 3    Associated Diagnoses: Supervision of high risk pregnancy, antepartum      clobetasol (TEMOVATE) 0.05 % external ointment Apply topically 2 times daily To hands as needed then transition to protopic  Qty: 60 g, Refills: 3    Comments: Can substitute augmented betamethasone if better with insurance  Associated Diagnoses: Hand dermatitis      famotidine (PEPCID) 40 MG tablet Take 1 tablet (40 mg) by mouth 2 times daily  Qty: 60 tablet, Refills: 3    Comments: Please do  delivery- patient speaks [place]  Associated Diagnoses: Supervision of high risk pregnancy, antepartum      sodium chloride 0.9 % neb solution 3 mLs by Other route 2 times daily  Qty: 300 mL, Refills: 4    Comments: 100 count box of 3ml vials. For use as directed with medically necessary contact lenses both eyes. Need 2 vials per day. No neb needed  Associated Diagnoses: Pellucid marginal degeneration of both corneas; Irregular astigmatism of both eyes      tacrolimus (PROTOPIC) 0.1 % external ointment Apply topically 2 times daily To hands as needed (this is not a steroid)  Qty: 60 g, Refills: 3    Associated Diagnoses: Hand dermatitis           STOP taking these medications       Prenatal Vit-Fe Fumarate-FA (PRENATAL MULTIVITAMIN W/IRON) 27-0.8 MG tablet Comments:   Reason for Stopping:                     Consultations:   Consultation during this admission received from OBGYN for short interval TOLAC.           Brief History of Labor:   On 3/13/2024  at 3:09 AM , this 28 year old year old  under Epidural analgesia delivered a viable male  infant weighing 9 lbs 3.8 oz  with APGAR scores below:  APGARs 1 Min 5Min 10Min   Totals: 7   9                 Hospital Course (HPI):   The patient's hospital course was unremarkable.  On discharge, her pain was well controlled. Vaginal bleeding is decreased.  Voiding without difficulty.  Ambulating well and tolerating a normal diet.  No fever.     Infant is stable. Feeding Method: Formula Both breast and formula as mom's milk comes in.     She was discharged on post-partum day #1.   Contraception plan: couple plans to discuss more at home, will decide by 2 week postpartum visit.   Post partum depression education was provided.         D/C Physical Exam:     Vitals:    24 2340 24 0453 24 0900 24 1000   BP: 127/77 131/74 111/74    BP Location:       Patient Position:       Cuff Size:       Pulse: 83 76 67    Resp:   16    Temp: 98.3  F (36.8  C)  98.1  F (36.7  C) 97.5  F (36.4  C)    TempSrc: Oral Oral Oral    SpO2: 98% 97%     Weight:    61.9 kg (136 lb 8 oz)       GENERAL:         healthy, alert, and no distress  RESPIRATORY:   No increased work of breathing, good air exchange, clear to auscultation bilaterally, no crackles or wheezing   CARDIOVASCULAR:   Normal regular rate and rhythm, normal S1 and S2, no S3 or S4, and no murmur noted   ABDOMEN:   No scars, normal bowel sounds, soft, non-distended, non-tender, no masses palpated, no hepatosplenomegally  Fundal height is 3cm below umbilicus.  Firm and  nontender.   EXTREMITIES:          no edema, non-tender     Post-partum hemoglobin:   Hemoglobin   Date Value Ref Range Status   2024 10.1 (L) 11.7 - 15.7 g/dL Final           Discharge Instructions and Follow-Up:     Discharge diet: Regular   Discharge activity: Activity as tolerated   Discharge follow-up: Follow up with primary care provider in 2 weeks   Wound care: Drink plenty of fluids  Ice to area for comfort          Discharge Disposition:     Discharged to home          Contraception plan:  couple plans to discuss more at home, will decide by 2 week postpartum visit.  .    Family phone number verified in EPIC and is correct Yes           Patient discussed with attending physician, Dr. Alvarado Wei , who agrees with the plan.    Yasmin Dhaliwal MD PGY-3  3/14/2024  [facility] Family Medicine Residency Program    Name:  Chrystal Vinh  :  1995  MRN:  [id number]

## 2024-03-14 NOTE — PROGRESS NOTES
"Outreach Note for EPIC    Chart reviewed, discharge plan discussed with patient, needs assessed. Patient verbalizes understanding of plan, requests [facility] visit as ordered, [facility] nurse visit planned for , Home Care Intake updated. Patient and , \"Cata\", phone number is reported as correct in EMR.    Patient states she has good support at home, has baby care essentials, and feels ready to discharge today. Outreach RN will continue to follow and assist if needed with discharge plan. No additional needs identified at this time.      "

## 2024-03-14 NOTE — PLAN OF CARE
"Goal Outcome Evaluation:      Plan of Care Reviewed With: patient    Overall Patient Progress: improvingOverall Patient Progress: improving       Plans to discharge home later today. Assessments WDL. Mother pumping \"until milk comes in\", formula feeding  for now.   "

## 2024-03-14 NOTE — DISCHARGE INSTRUCTIONS
Warning Signs after Having a Baby    Keep this paper on your fridge or somewhere else where you can see it.    Call your provider if you have any of these symptoms up to 12 weeks after having your baby.    Thoughts of hurting yourself or your baby  Pain in your chest or trouble breathing  Severe headache not helped by pain medicine  Eyesight concerns (blurry vision, seeing spots or flashes of light, other changes to eyesight)  Fainting, shaking or other signs of a seizure    Call 9-1-1 if you feel that it is an emergency.     The symptoms below can happen to anyone after giving birth. They can be very serious. Call your provider if you have any of these warning signs.    My provider s phone number: _______________________    Losing too much blood (hemorrhage)    Call your provider if you soak through a pad in less than an hour or pass blood clots bigger than a golf ball. These may be signs that you are bleeding too much.    Blood clots in the legs or lungs    After you give birth, your body naturally clots its blood to help prevent blood loss. Sometimes this increased clotting can happen in other areas of the body, like the legs or lungs. This can block your blood flow and be very dangerous.     Call your provider if you:  Have a red, swollen spot on the back of your leg that is warm or painful when you touch it.   Are coughing up blood.     Infection    Call your provider if you have any of these symptoms:  Fever of 100.4 F (38 C) or higher.  Pain or redness around your stitches if you had an incision.   Any yellow, white, or green fluid coming from places where you had stitches or surgery.    Mood Problems (postpartum depression)    Many people feel sad or have mood changes after having a baby. But for some people, these mood swings are worse.     Call your provider right away if you feel so anxious or nervous that you can't care for yourself or your baby.    Preeclampsia (high blood pressure)    Even if you  didn't have high blood pressure when you were pregnant, you are at risk for the high blood pressure disease called preeclampsia. This risk can last up to 12 weeks after giving birth.     Call your provider if you have:   Pain on your right side under your rib cage  Sudden swelling in the hands and face    Remember: You know your body. If something doesn't feel right, get medical help.     For informational purposes only. Not to replace the advice of your health care provider. Copyright 2020 [facility]. All rights reserved. Clinically reviewed by Bri Rodriguez, RNC-OB, MSN. [employer] [id number] - Rev 02/23.    A Homecare Visit is set up on Fri, March 15th.The RN will call you after 4 p.m. the evening before the visit with a time. Please do not make a clinic visit for the same day as your Homecare Visit. You can contact [facility] at [phone] if you have any further questions related to the home visit.

## 2024-03-18 ENCOUNTER — CARE COORDINATION (OUTPATIENT)
Dept: FAMILY MEDICINE | Facility: CLINIC | Age: 29
End: 2024-03-18
Payer: COMMERCIAL

## 2024-03-18 RX ORDER — MODIFIED LANOLIN
OINTMENT (GRAM) TOPICAL
Qty: 7 G | Refills: 3 | Status: SHIPPED | OUTPATIENT
Start: 2024-03-18

## 2024-03-18 NOTE — PROGRESS NOTES
Writer responded to CC notes from Dr Dhaliwal stating that Pt will need more assistance from  when they come in. Writer called Pt and the call was deferred to Jam Justice,  of Pt. Writer introduced  self and explained the reason for the call to offer  asisstance with making referrals and give out resources. Pt  says that they will be coming in today at 2:10. They would like to see writer at after the appt.    4:10pm       Writer saw baby Yaneli Justice with Pt and her , Jam Justice in the exam room with Pt.  wants to know how to add baby to their [facility] PMAP plan. Writer explained that they will need to obtain a social security card and number to child. They will need to obtain a birth certificate. Writer instructed that they need to present those to [facility] at their location office. Pt wants to know if they can get free daipers for new born. Writer explained that we are not able to provide free daipers for regular use. However, writer did explain that if a patient needs daipers for medical reasons that a prescription is needed to get that going. No further actons needed.

## 2024-03-28 ENCOUNTER — OFFICE VISIT (OUTPATIENT)
Dept: FAMILY MEDICINE | Facility: CLINIC | Age: 29
End: 2024-03-28
Payer: COMMERCIAL

## 2024-03-28 VITALS
OXYGEN SATURATION: 97 % | HEIGHT: 60 IN | RESPIRATION RATE: 20 BRPM | HEART RATE: 88 BPM | TEMPERATURE: 98.1 F | SYSTOLIC BLOOD PRESSURE: 121 MMHG | DIASTOLIC BLOOD PRESSURE: 84 MMHG | BODY MASS INDEX: 24.97 KG/M2 | WEIGHT: 127.2 LBS

## 2024-03-28 PROCEDURE — 96372 THER/PROPH/DIAG INJ SC/IM: CPT | Performed by: FAMILY MEDICINE

## 2024-03-28 PROCEDURE — 99213 OFFICE O/P EST LOW 20 MIN: CPT | Mod: 24 | Performed by: STUDENT IN AN ORGANIZED HEALTH CARE EDUCATION/TRAINING PROGRAM

## 2024-03-28 RX ORDER — MEDROXYPROGESTERONE ACETATE 150 MG/ML
150 INJECTION, SUSPENSION INTRAMUSCULAR
Status: ACTIVE | OUTPATIENT
Start: 2024-03-28

## 2024-03-28 RX ADMIN — MEDROXYPROGESTERONE ACETATE 150 MG: 150 INJECTION, SUSPENSION INTRAMUSCULAR at 15:31

## 2024-03-28 ASSESSMENT — ANXIETY QUESTIONNAIRES
3. WORRYING TOO MUCH ABOUT DIFFERENT THINGS: NOT AT ALL
GAD7 TOTAL SCORE: 4
2. NOT BEING ABLE TO STOP OR CONTROL WORRYING: NOT AT ALL
IF YOU CHECKED OFF ANY PROBLEMS ON THIS QUESTIONNAIRE, HOW DIFFICULT HAVE THESE PROBLEMS MADE IT FOR YOU TO DO YOUR WORK, TAKE CARE OF THINGS AT HOME, OR GET ALONG WITH OTHER PEOPLE: SOMEWHAT DIFFICULT
1. FEELING NERVOUS, ANXIOUS, OR ON EDGE: NOT AT ALL
5. BEING SO RESTLESS THAT IT IS HARD TO SIT STILL: NOT AT ALL
7. FEELING AFRAID AS IF SOMETHING AWFUL MIGHT HAPPEN: NOT AT ALL
6. BECOMING EASILY ANNOYED OR IRRITABLE: SEVERAL DAYS
GAD7 TOTAL SCORE: 4

## 2024-03-28 ASSESSMENT — PATIENT HEALTH QUESTIONNAIRE - PHQ9
5. POOR APPETITE OR OVEREATING: NEARLY EVERY DAY
SUM OF ALL RESPONSES TO PHQ QUESTIONS 1-9: 8

## 2024-03-28 NOTE — PROGRESS NOTES
HPI-Post Partum Visit -   Chrystal Justice is a 28 year old  with history of stillbirth, postpartum hemorrage, prior , late prenatal care, successful  with recent pregnancy who present for her post partum visit.     A Xintu Shuju  was used for  this visit.   was used for  this visit.     Patient Active Problem List   Diagnosis    Supervision of high risk pregnancy, antepartum    Language barrier, cultural differences    History of stillbirth in currently pregnant patient, third trimester    H/O postpartum hemorrhage, currently pregnant    Kidney stone    History of     Keratoconus of both eyes    Myopia of both eyes with astigmatism    Family history of consanguinity    Late prenatal care affecting pregnancy in second trimester    Short interval between pregnancies affecting pregnancy, antepartum    LGA (large for gestational age) fetus affecting management of mother, third trimester, not applicable or unspecified fetus    Encounter for triage in pregnant patient    Pregnancy       Current Outpatient Medications   Medication Sig Dispense Refill    acetaminophen (TYLENOL) 500 MG tablet Take 1-2 tablets (500-1,000 mg) by mouth every 6 hours as needed for mild pain 100 tablet 3    clobetasol (TEMOVATE) 0.05 % external ointment Apply topically 2 times daily To hands as needed then transition to protopic 60 g 3    famotidine (PEPCID) 40 MG tablet Take 1 tablet (40 mg) by mouth 2 times daily 60 tablet 3    ferrous sulfate (FEROSUL) 325 (65 Fe) MG tablet Take 1 tablet (325 mg) by mouth daily 90 tablet 0    hydrocortisone, Perianal, (ANUSOL-HC) 2.5 % cream Place rectally 3 times daily as needed for hemorrhoids 28 g 0    ibuprofen (ADVIL/MOTRIN) 800 MG tablet Take 1 tablet (800 mg) by mouth every 6 hours as needed for other (cramping) 90 tablet 0    lanolin ointment Apply topically every 2 hours 7 g 3    lanolin ointment Apply topically every hour as needed for other (sore  "nipples) 49 g 2    sodium chloride 0.9 % neb solution 3 mLs by Other route 2 times daily 300 mL 4    tacrolimus (PROTOPIC) 0.1 % external ointment Apply topically 2 times daily To hands as needed (this is not a steroid) 60 g 3                      Delivery date: 3/13/24      Patient had a  of viable boy, weight 4.19 kg (9 lb 3.8 oz)             with none complications.        Accompanying Signs & Symptoms:                        Breast feeding: breastfeeding going well, every 1-3 hrs, 8-12 times/24 hours with formula supplementation  Abdominal pain: No                       Bleeding since delivery: YES, Light  Uses 2 pads a day         Contraception choice: none        Patient has not had intercourse since delivery        Last PAP: No results found for: \"PAP\"  Pt screened for postpartum depression and complaints are: NEGATIVE         PHQ9= 8        SARAH= 4    Receiving dental care NO   Calcium intake is adequate     No Known Allergies         Review of Systems:     CONSTITUTIONAL: no fatigue, no unexpected change in weight  SKIN: no worrisome rashes or lesions  EYES: no acute vision problems or changes  ENT: no ear problems, no mouth problems, no throat problems  RESP: no significant cough, no shortness of breath  CV: no chest pain, no palpitations, no new or worsening peripheral edema  GI: no nausea, no vomiting, no constipation, no diarrhea  : no frequency, no dysuria, no hematuria  NEURO: no weakness, no dizziness, no headaches  ENDOCRINE: no temperature intolerance, no skin/hair changes  PSYCHIATRIC: Positive for mood changes see PHQ9/Gad7            Physical Exam:     Vitals:    24 1413   BP: 121/84   Pulse: 88   Resp: 20   Temp: 98.1  F (36.7  C)   TempSrc: Oral   SpO2: 97%   Height: 1.53 m (5' 0.24\")       GENERAL: healthy, alert, well nourished, well hydrated, no distress  HENT: ear canals- normal; TMs- normal; Nose- normal; Mouth- no ulcers, no lesions  NECK: no tenderness, no adenopathy, no " asymmetry, no masses, no stiffness; thyroid- normal to palpation  RESP: lungs clear to auscultation - no rales, no rhonchi, no wheezes  CV: regular rates and rhythm, normal S1 S2, no S3 or S4 and no murmur, no click or rub -  ABDOMEN: soft, no tenderness, no  hepatosplenomegaly, no masses, normal bowel sounds      Admission on 2024, Discharged on 2024    Hemoglobin 2024 10.8 (L)  11.7 - 15.7 g/dL Final    ABO/RH(D) 2024 AB POS   Final    Antibody Screen 2024 Negative  Negative Final    SPECIMEN EXPIRATION DATE 2024 [id number]   Final    Hemoglobin 2024 10.1 (L)  11.7 - 15.7 g/dL Final         Assessment and Plan   Chrystal Justice is a 28 year old  with history of stillbirth, postpartum hemorrage, prior , late prenatal care, successful  with recent pregnancy who present for her post partum visit.     Routine postpartum follow-up  -QBL 400ml. Hgb 10.1. Reported light bleeding, no anemia symptoms. Deferred Hgb check today    -Tdap for pertussis prophylaxis completed 24  -Pap due 2024  -Contraception: medroxyPROGESTERone (DEPO-PROVERA) injection 150 mg, will return in 3 months for nexplanon insertion     Precepted with Dr. Nallely Toure MD PGY2  [facility]

## 2024-03-28 NOTE — PROGRESS NOTES
Patient was given Depo Provera. Prior to medication administration, verified patient's identity using patient s name and date of birth. Please see MAR and medication order for additional information. Patient instructed to remain in clinic for 15 minutes and report any adverse reaction to staff immediately.    Vial/Syringe: Single dose vial. Was entire vial of medication used? Yes    NEXT INJECTION DUE: 6/13/24 - 7/11/24

## 2024-03-28 NOTE — PROGRESS NOTES
"Preceptor attestation:  Vital signs reviewed: /84   Pulse 88   Temp 98.1  F (36.7  C) (Oral)   Resp 20   Ht 1.53 m (5' 0.24\")   LMP 05/18/2023 (Exact Date)   SpO2 97%   Breastfeeding Yes   BMI 26.45 kg/m      Patient seen, evaluated, and discussed with the resident.  I verified the content of the note, which accurately reflects my assessment of the patient and the plan of care.    Supervising physician: Yasmin Browning MD  [facility]  "

## 2024-05-09 ENCOUNTER — TELEPHONE (OUTPATIENT)
Dept: FAMILY MEDICINE | Facility: CLINIC | Age: 29
End: 2024-05-09
Payer: COMMERCIAL

## 2024-05-09 NOTE — TELEPHONE ENCOUNTER
"Tried calling [place] with [facility] ([facility]) and there was no answer.    Called pt with [facility]) and she states that she missed her appt yesterday and would like to reschedule.  She states that she has had \"normal period\" flow vaginal bleeding since she delivered her baby.  She denies dizziness or lightheadedness.  She states that she has occasional cramping along \"bikini\" line.  Appt scheduled for Tuesday, 5/14/24 at 3:30 PM with Dr Dhaliwal.  Routed note to Dr Dhaliwal./Ruchi Gallo RN BSN   "

## 2024-05-09 NOTE — TELEPHONE ENCOUNTER
[facility] phone call message- general phone call:    Reason for call: Jam is calling because patient missed her appt yesterday and she is having some problems.  The earliest I can schedule the patient is 05/22 with her doctor or anyone female on that team.  He would like her to get in sooner than that.    Return call needed: Yes    OK to leave a message on voice mail? Yes    Primary language: Philto; jaime      needed? Yes    Call taken on May 9, 2024 at 9:32 AM by Jasmyn West

## 2024-05-14 ENCOUNTER — OFFICE VISIT (OUTPATIENT)
Dept: FAMILY MEDICINE | Facility: CLINIC | Age: 29
End: 2024-05-14
Payer: COMMERCIAL

## 2024-05-14 VITALS
DIASTOLIC BLOOD PRESSURE: 67 MMHG | SYSTOLIC BLOOD PRESSURE: 101 MMHG | WEIGHT: 133.4 LBS | RESPIRATION RATE: 20 BRPM | HEIGHT: 61 IN | BODY MASS INDEX: 25.19 KG/M2 | HEART RATE: 89 BPM | OXYGEN SATURATION: 97 % | TEMPERATURE: 97.9 F

## 2024-05-14 DIAGNOSIS — N92.6 IRREGULAR UTERINE BLEEDING: Primary | ICD-10-CM

## 2024-05-14 DIAGNOSIS — Z78.9 USES DEPO-PROVERA AS PRIMARY BIRTH CONTROL METHOD: ICD-10-CM

## 2024-05-14 LAB
ERYTHROCYTE [DISTWIDTH] IN BLOOD BY AUTOMATED COUNT: 12.6 % (ref 10–15)
HCT VFR BLD AUTO: 33.4 % (ref 35–47)
HGB BLD-MCNC: 11 G/DL (ref 11.7–15.7)
MCH RBC QN AUTO: 28.6 PG (ref 26.5–33)
MCHC RBC AUTO-ENTMCNC: 32.9 G/DL (ref 31.5–36.5)
MCV RBC AUTO: 87 FL (ref 78–100)
PLATELET # BLD AUTO: 292 10E3/UL (ref 150–450)
RBC # BLD AUTO: 3.84 10E6/UL (ref 3.8–5.2)
WBC # BLD AUTO: 7.1 10E3/UL (ref 4–11)

## 2024-05-14 PROCEDURE — 36415 COLL VENOUS BLD VENIPUNCTURE: CPT

## 2024-05-14 PROCEDURE — 85027 COMPLETE CBC AUTOMATED: CPT

## 2024-05-14 PROCEDURE — 99213 OFFICE O/P EST LOW 20 MIN: CPT | Mod: GC

## 2024-05-14 RX ORDER — IBUPROFEN 600 MG/1
600 TABLET, FILM COATED ORAL EVERY 6 HOURS
Qty: 56 TABLET | Refills: 0 | Status: SHIPPED | OUTPATIENT
Start: 2024-05-14 | End: 2024-05-28

## 2024-05-14 NOTE — LETTER
May 14, 2024      Chrystal Gillespiejanet  [address]  [place] [address]        Dear ,    We are writing to inform you of your test results.    Your hemoglobin has stayed stable despite the extra bleeding. Continue to take the ibuprofen every 6 hours for a total of 14 days to help decrease your bleeding.     Resulted Orders   CBC with platelets   Result Value Ref Range    WBC Count 7.1 4.0 - 11.0 10e3/uL    RBC Count 3.84 3.80 - 5.20 10e6/uL    Hemoglobin 11.0 (L) 11.7 - 15.7 g/dL    Hematocrit 33.4 (L) 35.0 - 47.0 %    MCV 87 78 - 100 fL    MCH 28.6 26.5 - 33.0 pg    MCHC 32.9 31.5 - 36.5 g/dL    RDW 12.6 10.0 - 15.0 %    Platelet Count 292 150 - 450 10e3/uL       If you have any questions or concerns, please call the clinic at the number listed above.       Sincerely,      Yasmin Dhaliwal MD

## 2024-05-14 NOTE — PROGRESS NOTES
"  Assessment & Plan     Vaginal delivery  Patient here for follow-up of vaginal bleeding.  She has been experiencing spotty bleeding postpartum.  She notes that this pattern of irregularity started around the time she received her first Depo injection 3/28/2024, concerned it may be related.  This is highly likely given progesterone only birth control tendencies to disrupt bleeding patterns.  Advise short course of ibuprofen, consider either TXA or Prometrium on top of the Depo if additional control required.  - ibuprofen (ADVIL/MOTRIN) 600 MG tablet; Take 1 tablet (600 mg) by mouth every 6 hours for 14 days    Diagnosis or treatment significantly limited by social determinants of health - Azeri speaking patient, only will see female providers per cultural norms  Ordering of each unique test  Prescription drug management  20 minutes spent by me on the date of the encounter doing chart review, history and exam, documentation and further activities per the note      BMI  Estimated body mass index is 25.62 kg/m  as calculated from the following:    Height as of this encounter: 1.537 m (5' 0.5\").    Weight as of this encounter: 60.5 kg (133 lb 6.4 oz).   Weight management plan: Patient referred to endocrine and/or weight management specialty      MEDICATIONS:   Orders Placed This Encounter   Medications    ibuprofen (ADVIL/MOTRIN) 600 MG tablet     Sig: Take 1 tablet (600 mg) by mouth every 6 hours for 14 days     Dispense:  56 tablet     Refill:  0          - Continue other medications without change    Return in about 3 weeks (around 6/4/2024), or if symptoms worsen or fail to improve.    Anny Jarrell is a 28 year old, presenting for the following health issues:  Vaginal Bleeding (Vaginal bleeding - ) and post partum (Missed last appointment for the post partum. )        5/14/2024     3:42 PM   Additional Questions   Roomed by OLEKSANDR Gloria MA   Accompanied by baby         5/14/2024    Information " "   services provided? Yes   Language Other   Other [facility]   Type of interpretation provided Telephone    name Rosenda    Agency [employer]     HPI   Post partum vaginal bleeding (gave birth 3/13). Previous Hgb 10.1  Recently had Depo Provera given with goal to eventually get Nexplanon    Bleeding has been intermittent but continuing. Sometimes lighter, but generally same amount. Bright red blood. No vaginal pain or uterine cramping.  No sex since baby born. Noted this pattern of bleeding started after Depo shot, worried that it is contributing.    No mood concerns at this time.     Wondering when sons are due for vaccines and would like to schedule before visit ends.     Review of Systems  Constitutional, HEENT, cardiovascular, pulmonary, gi and gu systems are negative, except as otherwise noted.      Objective    /67   Pulse 89   Temp 97.9  F (36.6  C) (Oral)   Resp 20   Ht 1.537 m (5' 0.5\")   Wt 60.5 kg (133 lb 6.4 oz)   SpO2 97%   Breastfeeding Yes   BMI 25.62 kg/m    Body mass index is 25.62 kg/m .  Physical Exam   GENERAL: alert and no distress  NECK: no adenopathy, no asymmetry, masses, or scars  RESP: lungs clear to auscultation - no rales, rhonchi or wheezes  CV: regular rate and rhythm, normal S1 S2, no S3 or S4, no murmur, click or rub, no peripheral edema  ABDOMEN: soft, nontender, no hepatosplenomegaly, no masses and bowel sounds normal, uterus palpable 1 cm above pelvic brim, nontender to palpation  MS: no gross musculoskeletal defects noted, no edema  PSYCH: mentation appears normal, affect normal/bright    No results found for this or any previous visit (from the past 24 hour(s)).        Signed Electronically by: Yasmin Dhaliwal MD      "

## 2024-05-14 NOTE — PATIENT INSTRUCTIONS
We're going to give you a short course of ibuprofen to help calm the bleeding down.  Take the ibuprofen every 6 hours scheduled for 2 weeks and your bleeding should get better.  If it does not, call me back and we will talk about other options we can do.

## 2024-05-28 NOTE — PROGRESS NOTES
Procedure Note-Nexplanon Insertion    Chrystal Justice is a patient of mine here for placement of etonogestrel implant (Nexplanon)    Indication:Contraception    Consent: Affirmation of informed consent was signed and scanned into the medical record. Risks, benefits and alternatives were discussed. Patient's questions were elicited and answered.   Procedure safety checklist was completed:  Yes  Time Out (Pause for the Cause) completed: Yes    Labs: UPT:  Negative  LMP:   irregular bleeding, unclear     Previous Contraception:  depoprovera  Counseling:  Pt. counseled on potential side effects of  Nexplanon, including unpredictable spotting/bleeding and plan for removal/replacement of Nexplanon in 3 years or less.    Preoperative Diagnosis: Desires effective contraception  Postoperative Diagnosis: etonogestrel implant in place  Skin prep Betadine  Anesthesia 1% lidocaine, with epi    Technique:   Patient was placed supine with left arm exposed.  Robbi was made 8-10cm above medial epicondial and a guiding robbi 4 cm above the first.  Arm was prepped with betadine.Insertion point was anesthetized with 1% lidocaine 2mL. After stretching the skin with thumb and index finger around the insertion site.  Skin punctured with the tip of the needle inserted at 30 degrees and then lowered to horizontal position. While lifting the skin with the tip of the needle, slided the needle to it's full length. Applicator was then stabilized and the purple slider was unlocked by pushing it slightly down. Slider moved back completely until it stopped. Applicator was then removed.  Correct placement of the implant was confirmed by palpation in the patient's arm and visualizing the purple top of the obturator.  Insertion site was dressed with an adhesive covered by a pressure dressing.      User card and patient chart label filled out. User card  given to patient for record. Nexplanon added to medication list     Lot# [ [id number] ]    EBL:  minimal  Complications:  No  Tolerance:  Pt tolerated procedure well and was in stable condition.         Follow up: Pt was instructed to call if bleeding, severe pain or foul smell.     Follow up in 2-4 weeks.    Resident: Yasmin Dhaliwal MD  Faculty: Dr. Wyatt Jaimes present for and supervised this entire procedure.  Flank pain noted by patient, wondering if she is having a kidney stone, sent patient to my colleague for same day evaluation.  See Dr. Raymond Singh's note for more details.

## 2024-05-31 ENCOUNTER — MEDICAL CORRESPONDENCE (OUTPATIENT)
Dept: HEALTH INFORMATION MANAGEMENT | Facility: CLINIC | Age: 29
End: 2024-05-31
Payer: COMMERCIAL

## 2024-05-31 NOTE — PROGRESS NOTES
Assessment & Plan     Irregular uterine bleeding  Uses Depo-Provera as primary birth control method  Patient received first dose of Depo-Provera 3/24 has been having irregular uterine bleeding since.  Given a 14-day course of high-dose ibuprofen with no impact on bleeding.  At this time, plan to do additional dose of Depo-Provera to try and stabilize bleeding.  If no improvement in bleeding in the next week to week and a half, also gave patient a prescription for tranexamic acid to take home to stabilize bleeding.    Patient planning to come in for Nexplanon placement 6/27, discussed with patient how this has a higher rate of irregular uterine bleeding, talked about other options including Depo-Provera and progesterone IUD.  Patient is not 100% sure what she would like to pick of the options given, open to discussing on the 27th.  Will keep the time as a 20-minute appointment in case we need to have a placement.  - medroxyPROGESTERone (DEPO-PROVERA) injection 150 mg  - tranexamic acid (LYSTEDA) 650 MG tablet; Take 2 tablets (1,300 mg) by mouth 3 times daily for 5 days          MEDICATIONS:   Orders Placed This Encounter   Medications    medroxyPROGESTERone (DEPO-PROVERA) injection 150 mg    tranexamic acid (LYSTEDA) 650 MG tablet     Sig: Take 2 tablets (1,300 mg) by mouth 3 times daily for 5 days     Dispense:  30 tablet     Refill:  0          - Continue other medications without change    Return in 23 days (on 6/27/2024).    Anny Jarrell is a 28 year old, presenting for the following health issues:  Abnormal Bleeding Problem (3 month of abnormal bleeding after giving birth. The medications she was taking has not been helping DEPO. Where it has caused irregular periods then she was moved onto the pills which have not been helping. )        6/4/2024     3:39 PM   Additional Questions   Roomed by OLEKSANDR Gloria MA   Accompanied by Children         6/4/2024    Information    services provided?  "Yes   Language Other   Other Maori   Type of interpretation provided Telephone    name rickie    ID [id number]    Agency [facility]  Services     HPI   Patient seen 2 weeks ago for irregular bleeding after receiving Depo Provera injection 3/28. Given high dose scheduled ibuprofen 5/14. If not controlled, planned for either Prometrium or TXA at this time.     Did not feel any improvement with ibuprofen course. Has a Nexplanon placement scheduled for later in the month.     Talked with patient extensively about options for control of bleeding.  After much discussion, patient open to receiving an additional Depo shot today slightly early.  In the event no improvement in bleeding within the next week to week and a half, would like to have some sort of oral medication back up.    Patient previously wanted to have a Nexplanon placed for longer term management of birth control, I did advise her that there is a higher likelihood of irregular uterine bleeding with that, we talked about IUD versus continuing Depo in the future.  Patient is not sure if she wants to commit to any 1 thing at this time, but will keep her appointment for the 27th and decide at that time.    Review of Systems  Constitutional, HEENT, cardiovascular, pulmonary, gi and gu systems are negative, except as otherwise noted.      Objective    /56   Pulse 82   Temp 97.4  F (36.3  C) (Oral)   Resp 16   Ht 1.575 m (5' 2\")   Wt 63 kg (138 lb 12.8 oz)   SpO2 97%   Breastfeeding Yes   BMI 25.39 kg/m    Body mass index is 25.39 kg/m .  Physical Exam   GENERAL: alert and no distress  CV: regular rate and rhythm, normal S1 S2, no S3 or S4, no murmur, click or rub, no peripheral edema  SKIN: no suspicious lesions or rashes  PSYCH: mentation appears normal, affect normal/bright          Signed Electronically by: Yasmin Dhaliwal MD      "

## 2024-06-04 ENCOUNTER — OFFICE VISIT (OUTPATIENT)
Dept: FAMILY MEDICINE | Facility: CLINIC | Age: 29
End: 2024-06-04
Payer: COMMERCIAL

## 2024-06-04 VITALS
OXYGEN SATURATION: 97 % | DIASTOLIC BLOOD PRESSURE: 56 MMHG | HEART RATE: 82 BPM | RESPIRATION RATE: 16 BRPM | SYSTOLIC BLOOD PRESSURE: 109 MMHG | TEMPERATURE: 97.4 F | HEIGHT: 62 IN | BODY MASS INDEX: 25.54 KG/M2 | WEIGHT: 138.8 LBS

## 2024-06-04 DIAGNOSIS — N92.6 IRREGULAR UTERINE BLEEDING: Primary | ICD-10-CM

## 2024-06-04 DIAGNOSIS — Z78.9 USES DEPO-PROVERA AS PRIMARY BIRTH CONTROL METHOD: ICD-10-CM

## 2024-06-04 PROBLEM — O09.30 LATE PRENATAL CARE: Status: ACTIVE | Noted: 2023-11-29

## 2024-06-04 PROCEDURE — 96372 THER/PROPH/DIAG INJ SC/IM: CPT

## 2024-06-04 PROCEDURE — 99214 OFFICE O/P EST MOD 30 MIN: CPT | Mod: 25

## 2024-06-04 RX ORDER — TRANEXAMIC ACID 650 MG/1
1300 TABLET ORAL 3 TIMES DAILY
Qty: 30 TABLET | Refills: 0 | Status: SHIPPED | OUTPATIENT
Start: 2024-06-04 | End: 2024-06-09

## 2024-06-04 RX ORDER — MEDROXYPROGESTERONE ACETATE 150 MG/ML
150 INJECTION, SUSPENSION INTRAMUSCULAR
Status: ACTIVE | OUTPATIENT
Start: 2024-06-04 | End: 2025-05-30

## 2024-06-04 RX ADMIN — MEDROXYPROGESTERONE ACETATE 150 MG: 150 INJECTION, SUSPENSION INTRAMUSCULAR at 16:28

## 2024-06-04 NOTE — PROGRESS NOTES

## 2024-06-04 NOTE — PATIENT INSTRUCTIONS
Going to give you an early injection of Depo-Provera today to help calm down your bleeding.  This should help within the next few weeks, but as a backup, I am sending you home with 5 days of tranexamic acid or TXA.    To take the TXA, you are going to take 2 pills 3 times a day for 5 days.     Appointment for Nexplanon placement 6/27, and let me know what you would like to do at that time.  We can continue with placing a Nexplanon, though this does have a higher risk of irregular uterine bleeding, we can place an IUD, or we can continue with every 3 months giving you a Depo shot if your bleeding comes down.  Please keep me in the loop on how you are doing, and let me know if you have any further concerns.

## 2024-06-04 NOTE — PROGRESS NOTES
"Preceptor Attestation:  Vitals:    06/04/24 1543   BP: 109/56   Pulse: 82   Resp: 16   Temp: 97.4  F (36.3  C)   TempSrc: Oral   SpO2: 97%   Weight: 63 kg (138 lb 12.8 oz)   Height: 1.575 m (5' 2\")          I discussed the patient with the resident and evaluated the patient in person. I have verified the content of the note, which accurately reflects my assessment of the patient and the plan of care.   Supervising Physician:  Yasmin Ortiz MD    "

## 2024-06-26 ENCOUNTER — ALLIED HEALTH/NURSE VISIT (OUTPATIENT)
Dept: FAMILY MEDICINE | Facility: CLINIC | Age: 29
End: 2024-06-26
Payer: COMMERCIAL

## 2024-06-26 VITALS
DIASTOLIC BLOOD PRESSURE: 72 MMHG | TEMPERATURE: 98.6 F | HEART RATE: 67 BPM | SYSTOLIC BLOOD PRESSURE: 110 MMHG | RESPIRATION RATE: 20 BRPM | OXYGEN SATURATION: 97 %

## 2024-06-26 DIAGNOSIS — Z23 ENCOUNTER FOR IMMUNIZATION: Primary | ICD-10-CM

## 2024-06-26 PROCEDURE — 99207 PR NO CHARGE NURSE ONLY: CPT

## 2024-06-26 PROCEDURE — 90480 ADMN SARSCOV2 VAC 1/ONLY CMP: CPT

## 2024-06-26 PROCEDURE — 91320 SARSCV2 VAC 30MCG TRS-SUC IM: CPT

## 2024-06-26 NOTE — PROGRESS NOTES
Prior to immunization administration, verified patients identity using patient s name and date of birth. Please see Immunization Activity for additional information.     Is the patient's temperature normal (100.5 or less)? Yes     Patient MEETS CRITERIA. PROCEED with vaccine administration.          6/26/2024   COVID   Have you had myocarditis or pericarditis (inflammation of or around the heart muscle) after getting a COVID-19 vaccine? No   Have you had a serious reaction to a COVID vaccine or something in a COVID vaccine, like polyethylene glycol (PEG) or polysorbate? No   Have you had multisystem inflammatory syndrome from COVID-19 in the past 90 days? No            Patient MEETS CRITERIA. PROCEED with vaccine administration.    Patient instructed to remain in clinic for 15 minutes afterwards, and to report any adverse reactions.      Link to Ancillary Visit Immunization Standing Orders SmartSet     Screening performed by Sharda Mobley CMA on 6/26/2024 at 2:42 PM.

## 2024-06-27 ENCOUNTER — OFFICE VISIT (OUTPATIENT)
Dept: FAMILY MEDICINE | Facility: CLINIC | Age: 29
End: 2024-06-27
Payer: COMMERCIAL

## 2024-06-27 ENCOUNTER — TELEPHONE (OUTPATIENT)
Dept: FAMILY MEDICINE | Facility: CLINIC | Age: 29
End: 2024-06-27

## 2024-06-27 VITALS
HEIGHT: 61 IN | RESPIRATION RATE: 24 BRPM | HEART RATE: 80 BPM | SYSTOLIC BLOOD PRESSURE: 104 MMHG | WEIGHT: 138 LBS | BODY MASS INDEX: 26.06 KG/M2 | OXYGEN SATURATION: 97 % | TEMPERATURE: 97.6 F | DIASTOLIC BLOOD PRESSURE: 68 MMHG

## 2024-06-27 DIAGNOSIS — Z30.017 INSERTION OF IMPLANTABLE SUBDERMAL CONTRACEPTIVE: Primary | ICD-10-CM

## 2024-06-27 DIAGNOSIS — Z30.017 NEXPLANON INSERTION: ICD-10-CM

## 2024-06-27 DIAGNOSIS — Z60.3 LANGUAGE BARRIER, CULTURAL DIFFERENCES: ICD-10-CM

## 2024-06-27 DIAGNOSIS — R31.9 HEMATURIA, UNSPECIFIED TYPE: ICD-10-CM

## 2024-06-27 DIAGNOSIS — R10.9 FLANK PAIN: ICD-10-CM

## 2024-06-27 DIAGNOSIS — Z87.442 HISTORY OF KIDNEY STONES: ICD-10-CM

## 2024-06-27 DIAGNOSIS — N92.6 IRREGULAR UTERINE BLEEDING: ICD-10-CM

## 2024-06-27 LAB
ALBUMIN UR-MCNC: NEGATIVE MG/DL
ANION GAP SERPL CALCULATED.3IONS-SCNC: 7 MMOL/L (ref 3–14)
APPEARANCE UR: CLEAR
BACTERIA #/AREA URNS HPF: ABNORMAL /HPF
BILIRUB UR QL STRIP: NEGATIVE
BUN SERPL-MCNC: 10 MG/DL (ref 7–30)
CALCIUM SERPL-MCNC: 9.5 MG/DL (ref 8.5–10.1)
CHLORIDE BLD-SCNC: 105 MMOL/L (ref 94–109)
CO2 SERPL-SCNC: 27 MMOL/L (ref 20–32)
COLOR UR AUTO: YELLOW
CREAT SERPL-MCNC: 0.7 MG/DL (ref 0.52–1.04)
EGFRCR SERPLBLD CKD-EPI 2021: >90 ML/MIN/1.73M2
GLUCOSE BLD-MCNC: 94 MG/DL (ref 70–99)
GLUCOSE UR STRIP-MCNC: NEGATIVE MG/DL
HCG UR QL: NEGATIVE
HGB UR QL STRIP: ABNORMAL
KETONES UR STRIP-MCNC: NEGATIVE MG/DL
LEUKOCYTE ESTERASE UR QL STRIP: ABNORMAL
NITRATE UR QL: NEGATIVE
PH UR STRIP: 6 [PH] (ref 5–8)
POTASSIUM BLD-SCNC: 4 MMOL/L (ref 3.4–5.3)
RBC #/AREA URNS AUTO: ABNORMAL /HPF
SODIUM SERPL-SCNC: 139 MMOL/L (ref 135–145)
SP GR UR STRIP: 1.02 (ref 1–1.03)
SQUAMOUS #/AREA URNS AUTO: ABNORMAL /LPF
UROBILINOGEN UR STRIP-ACNC: 0.2 E.U./DL
WBC #/AREA URNS AUTO: ABNORMAL /HPF

## 2024-06-27 PROCEDURE — 11981 INSERTION DRUG DLVR IMPLANT: CPT | Mod: GC

## 2024-06-27 PROCEDURE — 36415 COLL VENOUS BLD VENIPUNCTURE: CPT

## 2024-06-27 PROCEDURE — 80048 BASIC METABOLIC PNL TOTAL CA: CPT

## 2024-06-27 PROCEDURE — 81025 URINE PREGNANCY TEST: CPT

## 2024-06-27 PROCEDURE — 81001 URINALYSIS AUTO W/SCOPE: CPT

## 2024-06-27 PROCEDURE — 99215 OFFICE O/P EST HI 40 MIN: CPT | Mod: 25

## 2024-06-27 RX ORDER — LIDOCAINE HYDROCHLORIDE AND EPINEPHRINE 10; 10 MG/ML; UG/ML
5 INJECTION, SOLUTION INFILTRATION; PERINEURAL ONCE
Status: COMPLETED | OUTPATIENT
Start: 2024-06-27 | End: 2024-06-27

## 2024-06-27 RX ORDER — IBUPROFEN 600 MG/1
600 TABLET, FILM COATED ORAL EVERY 6 HOURS PRN
Qty: 100 TABLET | Refills: 0 | Status: SHIPPED | OUTPATIENT
Start: 2024-06-27

## 2024-06-27 RX ADMIN — LIDOCAINE HYDROCHLORIDE AND EPINEPHRINE 5 ML: 10; 10 INJECTION, SOLUTION INFILTRATION; PERINEURAL at 15:27

## 2024-06-27 SDOH — SOCIAL STABILITY - SOCIAL INSECURITY: ACCULTURATION DIFFICULTY: Z60.3

## 2024-06-27 NOTE — PROGRESS NOTES
Assessment & Plan     Flank pain  History of kidney stones  Hematuria, unspecified type  28-year-old female with history of kidney stones and urosepsis presented initially today for Nexplanon insertion, see Dr. Dhaliwal's note.  Also presented with flank pain and hematuria concerning for recurrent kidney stone.  She is vitally stable and well-appearing and exam is unremarkable however UA did come back with RBCs and hematuria.  Otherwise no infection.  Discussed with advanced diagnostic services and recommended patient be seen tomorrow morning, they graciously accepted the patient to be seen tomorrow morning.  Also discussed with patient reasons to return or present to the emergency department prior to this.  Patient in agreement with plan.  - Urine Macroscopic with reflex to Microscopic  - Urine Microscopic Exam  - ibuprofen (ADVIL/MOTRIN) 600 MG tablet; Take 1 tablet (600 mg) by mouth every 6 hours as needed for moderate pain  - Basic metabolic panel; Future  - Basic metabolic panel    Referral to Acute and Diagnostic Services    [phone] ([address] [address], [place] [address]    Transition to Acute & Diagnostic Services Clinic has been discussed with patient, and she agrees with next level of care.   Patient understands that evaluation/treatment at [facility] typically takes significantly longer than in clinic/urgent care (>2 hours).  The [facility] Acute and Diagnostics Services Clinic has been contacted by provider/staff to confirm patient acceptance.     Special issues:  History complicated urosepsis 2/2 to kidney stones    None    The following provider has assessed this patient for intervention at [facility], and directed the patient for referral: Raymond Samantha Herrera is a 28 year old, presenting for the following health issues:  Contraception (Consult - poss IUD or nexplanon )        6/27/2024     2:29 PM   Additional Questions   Roomed by OLEKSANDR zepeda MA  "  Accompanied by Self         6/27/2024    Information    services provided? Yes   Language Other   Other Telugu   Type of interpretation provided Telephone    name Heather    Agency [facility]   Additional  present? Yes   Additional  language Other   Other-Addtional  Telugu   Additional  type of interpretation provided Telephone   Additional  agency [facility]  Services        HPI     Genitourinary - Female  Onset/Duration: 2 days  Description:   Painful urination (Dysuria): No           Frequency: No  Blood in urine (Hematuria): No  Delay in urine (Hesitency): No  Progression of Symptoms:  improving  Accompanying Signs & Symptoms:  Fever/chills: No  Flank pain: YES- yesterday but now improve.   Nausea and vomiting: No  Vaginal symptoms: none  Abdominal/Pelvic Pain: No  History:   History of frequent UTI s: No  History of kidney stones: YES  Sexually Active: YES  Possibility of pregnancy: Don't Know  Precipitating or alleviating factors: None  Therapies tried and outcome: Tylenol    Review of Systems  Constitutional, HEENT, cardiovascular, pulmonary, gi and gu systems are negative, except as otherwise noted.      Objective    /68   Pulse 80   Temp 97.6  F (36.4  C) (Oral)   Resp 24   Ht 1.542 m (5' 0.7\")   Wt 62.6 kg (138 lb)   LMP 06/27/2024   SpO2 97%   Breastfeeding Yes   BMI 26.33 kg/m    Body mass index is 26.33 kg/m .  Physical Exam   GENERAL: alert and no distress  NECK: no adenopathy, no asymmetry, masses, or scars  RESP: lungs clear to auscultation - no rales, rhonchi or wheezes  CV: regular rate and rhythm, normal S1 S2, no S3 or S4, no murmur, click or rub, no peripheral edema  ABDOMEN: soft, nontender, no hepatosplenomegaly, no masses and bowel sounds normal  MS: no gross musculoskeletal defects noted, no edema. No flank pain.     Results for orders placed or performed in " visit on 06/27/24 (from the past 24 hour(s))   HCG qualitative urine   Result Value Ref Range    hCG Urine Qualitative Negative Negative   Urine Macroscopic with reflex to Microscopic   Result Value Ref Range    Color Urine Yellow Colorless, Straw, Light Yellow, Yellow    Appearance Urine Clear Clear    Glucose Urine Negative Negative mg/dL    Bilirubin Urine Negative Negative    Ketones Urine Negative Negative mg/dL    Specific Gravity Urine 1.025 1.005 - 1.030    Blood Urine Large (A) Negative    pH Urine 6.0 5.0 - 8.0    Protein Albumin Urine Negative Negative mg/dL    Urobilinogen Urine 0.2 0.2, 1.0 E.U./dL    Nitrite Urine Negative Negative    Leukocyte Esterase Urine Trace (A) Negative   Urine Microscopic Exam   Result Value Ref Range    Bacteria Urine Few (A) None Seen /HPF    RBC Urine 5-10 (A) 0-2 /HPF /HPF    WBC Urine None Seen 0-5 /HPF /HPF    Squamous Epithelials Urine Few (A) None Seen /LPF           Signed Electronically by: Yasmin Dhaliwal MD     [phone]

## 2024-06-27 NOTE — PATIENT INSTRUCTIONS
Process for making after hours referrals to the ADS    1.  Send an Epic message to the ADS clinic pool at patient preferred site  [facility] [place]: [facility]  ([id number])  [place]: [facility] [place] [facility] Support Staff([id number])  [place]: [place] [facility] ([id number])    [place]: [facility] [place] [facility] ([id number])  [place]: [place] [facility] ([phone])  Include the reason for the referral and any pertinent information.    2.  Verify that the patient is medically stable and willing to be seen shortly after 9 am the next day the clinic is open.  If the patient is having abdominal or GI symptoms, consider telling them to be NPO if you think imaging will be needed.    3.  Give the patient the phone number for the site.  Have the patient call the ADS if they haven't been contacted by 10 am.  [place]: [phone]  [place]:  [phone]  [place]: [phone]   [place]:  [phone]    [phone]

## 2024-06-27 NOTE — TELEPHONE ENCOUNTER
Called patient with  and no answer.  unable to leave a voicemail as mailbox is full. Gilbert JAIME received referral to see patient Friday morning.    SURY Grissom

## 2024-06-27 NOTE — PROGRESS NOTES
Preceptor Attestation:    I discussed the patient with the resident and evaluated the patient in person. I was present for and supervised the entire procedure. I have verified the content of the note, which accurately reflects my assessment of the patient and the plan of care.   Supervising Physician:  Wyatt Jaimes MD.

## 2024-07-15 ENCOUNTER — MEDICAL CORRESPONDENCE (OUTPATIENT)
Dept: HEALTH INFORMATION MANAGEMENT | Facility: CLINIC | Age: 29
End: 2024-07-15
Payer: COMMERCIAL

## 2024-07-16 ENCOUNTER — OFFICE VISIT (OUTPATIENT)
Dept: FAMILY MEDICINE | Facility: CLINIC | Age: 29
End: 2024-07-16
Payer: COMMERCIAL

## 2024-07-16 VITALS
HEIGHT: 60 IN | BODY MASS INDEX: 27.29 KG/M2 | WEIGHT: 139 LBS | DIASTOLIC BLOOD PRESSURE: 74 MMHG | RESPIRATION RATE: 20 BRPM | TEMPERATURE: 98.5 F | OXYGEN SATURATION: 95 % | SYSTOLIC BLOOD PRESSURE: 117 MMHG | HEART RATE: 94 BPM

## 2024-07-16 DIAGNOSIS — N93.9 ABNORMAL UTERINE BLEEDING (AUB): Primary | ICD-10-CM

## 2024-07-16 PROBLEM — Z36.89 ENCOUNTER FOR TRIAGE IN PREGNANT PATIENT: Status: RESOLVED | Noted: 2024-03-12 | Resolved: 2024-07-16

## 2024-07-16 PROBLEM — O09.90 SUPERVISION OF HIGH RISK PREGNANCY, ANTEPARTUM: Status: RESOLVED | Noted: 2023-11-29 | Resolved: 2024-07-16

## 2024-07-16 PROBLEM — O36.63X0 LGA (LARGE FOR GESTATIONAL AGE) FETUS AFFECTING MANAGEMENT OF MOTHER, THIRD TRIMESTER, NOT APPLICABLE OR UNSPECIFIED FETUS: Status: RESOLVED | Noted: 2024-02-12 | Resolved: 2024-07-16

## 2024-07-16 PROBLEM — Z34.90 PREGNANCY: Status: RESOLVED | Noted: 2024-03-12 | Resolved: 2024-07-16

## 2024-07-16 LAB
ERYTHROCYTE [DISTWIDTH] IN BLOOD BY AUTOMATED COUNT: 12.3 % (ref 10–15)
FERRITIN SERPL-MCNC: 29 NG/ML (ref 6–175)
FERRITIN SERPL-MCNC: 32 NG/ML (ref 6–175)
HCT VFR BLD AUTO: 35.3 % (ref 35–47)
HGB BLD-MCNC: 11.6 G/DL (ref 11.7–15.7)
IRON BINDING CAPACITY (ROCHE): 327 UG/DL (ref 240–430)
IRON SATN MFR SERPL: 36 % (ref 15–46)
IRON SERPL-MCNC: 119 UG/DL (ref 37–145)
MCH RBC QN AUTO: 28.9 PG (ref 26.5–33)
MCHC RBC AUTO-ENTMCNC: 32.9 G/DL (ref 31.5–36.5)
MCV RBC AUTO: 88 FL (ref 78–100)
PLATELET # BLD AUTO: 304 10E3/UL (ref 150–450)
RBC # BLD AUTO: 4.01 10E6/UL (ref 3.8–5.2)
TSH SERPL DL<=0.005 MIU/L-ACNC: 0.62 UIU/ML (ref 0.3–4.2)
WBC # BLD AUTO: 6.7 10E3/UL (ref 4–11)

## 2024-07-16 PROCEDURE — 85027 COMPLETE CBC AUTOMATED: CPT

## 2024-07-16 PROCEDURE — 83540 ASSAY OF IRON: CPT

## 2024-07-16 PROCEDURE — 36415 COLL VENOUS BLD VENIPUNCTURE: CPT

## 2024-07-16 PROCEDURE — 83550 IRON BINDING TEST: CPT

## 2024-07-16 PROCEDURE — 99214 OFFICE O/P EST MOD 30 MIN: CPT | Mod: GC

## 2024-07-16 PROCEDURE — 84443 ASSAY THYROID STIM HORMONE: CPT

## 2024-07-16 PROCEDURE — 82728 ASSAY OF FERRITIN: CPT

## 2024-07-16 RX ORDER — NAPROXEN 500 MG/1
500 TABLET ORAL 2 TIMES DAILY WITH MEALS
Qty: 20 TABLET | Refills: 0 | Status: SHIPPED | OUTPATIENT
Start: 2024-07-16

## 2024-07-16 NOTE — PATIENT INSTRUCTIONS
- Start taking Naproxen 500 mg at onset and three to five hours later, then 250 to 500 mg twice a day   - please stop and have your labs done after today's visit  - they will call you to schedule your ultrasound   - I want to see you back in 1-2 weeks to see how you're doing and responding to medication

## 2024-07-16 NOTE — PROGRESS NOTES
Assessment & Plan     Abnormal uterine bleeding (AUB)  28-year-old  with 3.5-month history of AUB with prolonged menstruation.  Has received therapy with NSAIDs, TXA, and progesterone (Depo-Provera and Nexplanon) without cessation of bleeding or significant interval change.  Due to her postpartum and lactating status, we are avoiding estrogen-containing therapy in this patient.  Will perform additional workup to ensure we are not missing retained products or other structural causes of bleeding.  Will repeat NSAID course with more structured timing of initiation doses. Follow up in 1-2 weeks to discuss results and next steps.   - CBC with Platelets and Reflex to Iron Studies  - Iron & Iron Binding Capacity  - Ferritin  - TSH with free T4 reflex  - US Pelvic Complete with Transvaginal  - naproxen (NAPROSYN) 500 MG tablet  Dispense: 20 tablet; Refill: 0        Return in about 1 week (around 2024) for Follow up, with me.        Anny Jarrell is a 28 year old, presenting for the following health issues:  RECHECK (Irregular bleeding)        2024    10:09 AM   Additional Questions   Roomed by laureano   Accompanied by family         2024    Information    services provided? Yes   Other [facility]   Type of interpretation provided Telephone    ID [phone]        HPI     Menstrual Concern  Onset/Duration: 3/12/24, since delivery   Description:   Describe bleeding/flow:   Clots: YES  Number of pads/day: 3-4        Cramping: mild  Accompanying Signs & Symptoms:  Lightheadedness: No  Nosebleeds/Easy bruising: YES  Vaginal Discharge: No  Acne: No  Change in body hair: No  History:  Patient's last menstrual period was 2024.  Previous normal periods: YES  Contraceptive use: Depo-Provera and Nexplanon  Sexually active: No  Abnormal PAP Smears: No  Precipitating or alleviating factors: None  Therapies tried and outcome: Tried 2 week course of NSAIDs and 5 day course of TXA,  bleeding continued despite these interventions.     Patient is 4 months postpartum  She received Depo-Provera injection for contraception on 3/28/2024  She returned with AUB and HMB, having prolonged periods  2-week course of NSAID therapy completed with no change  Patient received second depo-provera injection on 6/4/24 and subsequently completed 5-day course of TXA without cessation of bleeding  Patient returned requesting more long-term contraception and had Nexplanon placed 6/27/24 aware this intervention was likely to cause additional bleeding    Since last visit, when Nexplanon was placed, bleeding decreased initially but now for last 3 days has returned with heavier blood loss, feeling 3-4 pads per day with some small clots    Patient endorses lightheadedness 3 days ago, has since resolved and not returned    No other vaginal or urinary symptoms  Patient is breast-feeding and therefore avoiding estrogen containing therapies        Objective    /74 (BP Location: Right arm, Patient Position: Sitting, Cuff Size: Adult Regular)   Pulse 94   Temp 98.5  F (36.9  C) (Oral)   Resp 20   Ht 1.524 m (5')   Wt 63 kg (139 lb)   LMP 06/27/2024   SpO2 95%   BMI 27.15 kg/m    Body mass index is 27.15 kg/m .  Physical Exam   GENERAL: alert and no distress  NECK: no adenopathy, no asymmetry, masses, or scars  RESP: lungs clear to auscultation - no rales, rhonchi or wheezes  CV: regular rate and rhythm, normal S1 S2, no S3 or S4, no murmur, click or rub, no peripheral edema  ABDOMEN: soft, nontender, no hepatosplenomegaly, no masses and bowel sounds normal  MS: no gross musculoskeletal defects noted, no edema  NEURO: Normal strength and tone, mentation intact and speech normal    Results for orders placed or performed in visit on 07/16/24 (from the past 24 hour(s))   CBC with Platelets and Reflex to Iron Studies    Narrative    The following orders were created for panel order CBC with Platelets and Reflex to Iron  Studies.  Procedure                               Abnormality         Status                     ---------                               -----------         ------                     CBC with Platelets and R...[[id number]]  Abnormal            Edited Result - FINAL      Extra Green Top (Lithium...[[id number]]                                                   Please view results for these tests on the individual orders.   CBC with Platelets and Reflex to Iron Studies   Result Value Ref Range    WBC Count 6.7 4.0 - 11.0 10e3/uL    RBC Count 4.01 3.80 - 5.20 10e6/uL    Hemoglobin 11.6 (L) 11.7 - 15.7 g/dL    Hematocrit 35.3 35.0 - 47.0 %    MCV 88 78 - 100 fL    MCH 28.9 26.5 - 33.0 pg    MCHC 32.9 31.5 - 36.5 g/dL    RDW 12.3 10.0 - 15.0 %    Platelet Count 304 150 - 450 10e3/uL           Signed Electronically by: Sue Daley DO

## 2024-08-05 ENCOUNTER — ANCILLARY PROCEDURE (OUTPATIENT)
Dept: ULTRASOUND IMAGING | Facility: CLINIC | Age: 29
End: 2024-08-05
Attending: FAMILY MEDICINE
Payer: COMMERCIAL

## 2024-08-05 DIAGNOSIS — N93.9 ABNORMAL UTERINE BLEEDING (AUB): ICD-10-CM

## 2024-08-05 PROCEDURE — 76856 US EXAM PELVIC COMPLETE: CPT | Mod: TC | Performed by: RADIOLOGY

## 2024-08-05 NOTE — LETTER
August 6, 2024      Chrystal Justice  [address]  [place] [address]        Dear ,    We are writing to inform you of your test results.    Your ultrasound was negative - please f/up with Dr Butts.     Resulted Orders   US Pelvis Complete without Transvaginal    Narrative    EXAM: US PELVIC TRANSABDOMINAL  LOCATION: [facility]  DATE: 8/5/2024    INDICATION:  Abnormal uterine bleeding (AUB)  COMPARISON: None.  TECHNIQUE: Transabdominal scans were performed.    FINDINGS:    UTERUS: 6.6 x 5.7 x 4.0 cm. Normal in size and position with no masses.    ENDOMETRIUM: 2 mm. Normal smooth endometrium.    RIGHT OVARY: 2.7 x 2.8 x 2.0 cm. Normal.     LEFT OVARY: 2.7 x 2.7 x 1.6 cm. Normal.    No significant free fluid.      Impression    IMPRESSION:  1.  Negative pelvic ultrasound.             If you have any questions or concerns, please call the clinic at the number listed above.       Sincerely,      MD Tabitha Jackson MD, PhD covering for Dr Butts

## 2024-08-05 NOTE — NURSING NOTE
Due to patient being non-English speaking/uses sign language, an  was used for this visit. Only for face-to-face interpretation by an external agency, date and length of interpretation can be found on the scanned worksheet.     name: #[id number]  Agency:  [employer]  Language:  Ade    Telephone number: [phone]  Type of interpretation: Telephone, spoken

## 2024-08-05 NOTE — LETTER
August 6, 2024      Chrystal Justice  [address]  [place] [address]        Dear ,    We are writing to inform you of your test results.    Your ultrasound was negative - please f/up with Dr Butts.     Resulted Orders   US Pelvis Complete without Transvaginal    Narrative    EXAM: US PELVIC TRANSABDOMINAL  LOCATION: [facility]  DATE: 8/5/2024    INDICATION:  Abnormal uterine bleeding (AUB)  COMPARISON: None.  TECHNIQUE: Transabdominal scans were performed.    FINDINGS:    UTERUS: 6.6 x 5.7 x 4.0 cm. Normal in size and position with no masses.    ENDOMETRIUM: 2 mm. Normal smooth endometrium.    RIGHT OVARY: 2.7 x 2.8 x 2.0 cm. Normal.     LEFT OVARY: 2.7 x 2.7 x 1.6 cm. Normal.    No significant free fluid.      Impression    IMPRESSION:  1.  Negative pelvic ultrasound.             If you have any questions or concerns, please call the clinic at the number listed above.       Sincerely,      MD Tabitha Jackson MD, PhD covering for Dr Butts

## 2024-09-03 NOTE — TELEPHONE ENCOUNTER
FUTURE VISIT INFORMATION      FUTURE VISIT INFORMATION:  Date: 9/17/2024  Time: 2:00 PM  Location: [facility]  REFERRAL INFORMATION:  Referring provider:  Sherine Milton MD   Referring providers clinic:  [facility] [place]   Reason for visit/diagnosis:  L30.9 (ICD-10-CM) - Hand dermatitis, patch testing    RECORDS REQUESTED FROM:       Clinic name Comments Records Status Imaging Status   [facility] [place] 2/20/24 - [facility]an Epic

## 2024-09-12 ENCOUNTER — TELEPHONE (OUTPATIENT)
Dept: DERMATOLOGY | Facility: CLINIC | Age: 29
End: 2024-09-12
Payer: COMMERCIAL

## 2024-09-17 ENCOUNTER — PRE VISIT (OUTPATIENT)
Dept: ALLERGY | Facility: CLINIC | Age: 29
End: 2024-09-17

## 2024-09-17 ENCOUNTER — MEDICAL CORRESPONDENCE (OUTPATIENT)
Dept: HEALTH INFORMATION MANAGEMENT | Facility: CLINIC | Age: 29
End: 2024-09-17

## 2025-02-24 ENCOUNTER — OFFICE VISIT (OUTPATIENT)
Dept: FAMILY MEDICINE | Facility: CLINIC | Age: 30
End: 2025-02-24
Payer: COMMERCIAL

## 2025-02-24 VITALS
WEIGHT: 140.8 LBS | HEIGHT: 60 IN | SYSTOLIC BLOOD PRESSURE: 107 MMHG | RESPIRATION RATE: 18 BRPM | DIASTOLIC BLOOD PRESSURE: 70 MMHG | HEART RATE: 77 BPM | OXYGEN SATURATION: 95 % | TEMPERATURE: 98.1 F | BODY MASS INDEX: 27.64 KG/M2

## 2025-02-24 DIAGNOSIS — R10.9 FLANK PAIN: Primary | ICD-10-CM

## 2025-02-24 RX ORDER — IBUPROFEN 600 MG/1
600 TABLET, FILM COATED ORAL EVERY 6 HOURS PRN
Qty: 90 TABLET | Refills: 0 | Status: SHIPPED | OUTPATIENT
Start: 2025-02-24

## 2025-02-24 RX ORDER — TAMSULOSIN HYDROCHLORIDE 0.4 MG/1
0.4 CAPSULE ORAL DAILY
Qty: 90 CAPSULE | Refills: 1 | Status: SHIPPED | OUTPATIENT
Start: 2025-02-24

## 2025-02-24 NOTE — PROGRESS NOTES
Preceptor Attestation:    I discussed the patient with the resident and evaluated the patient in person. I have verified the content of the note, which accurately reflects my assessment of the patient and the plan of care.   Supervising Physician:  Jame Edwards MD.

## 2025-02-24 NOTE — PATIENT INSTRUCTIONS
You were seen today for a possible kidney stone.    Symptom management:  - Take Flomax as prescribed until resolution of symptoms  - Follow a low sodium diet to prevent recurrence  - May use tylenol or ibuprofen for discomfort  - Strain urine to catch any stones for later analysis    Reasons to be seen immediately in the emergency room:  - Worsening flank pain  - Develop a fever of 100.4 or higher  - Decreased urine output  - No symptom improvement in 3 days    ???? ?? ? ????? ??????? ???? ????? ???? ???.    ? ??? ??????:  - Flomax ??? ???? ?? ??????? ??? ????? ?? ??? ?? ??? ????? ?? ??  - ? ??? ????? ?????? ????? ? ??? ????? ???? ????? ???  - ????? ?? ? ??????? ????? ??????? ?? ?????????? ??????  - ????? ???? ????? ???? ? ?????? ????? ????? ???? ???? ?????    ?? ????? ???? ?? ??????? ? ????? ???????:  - ? ???? ??? ??????  - ? 100.4 ?? ??? ??? ???????? ???  - ? ????? ?????  - ?? 3 ???? ?? ? ??? ????? ?? ?? ????

## 2025-02-24 NOTE — PROGRESS NOTES
Assessment & Plan     Flank pain  Longstanding past history of kidney stones.  Presents today with 2 months right flank pain, gross hematuria, consistent with past stones.  Will ultrasound to see if size of stone warrants surgical referral.  - Urine Macroscopic with reflex to Microscopic  - US Kidney Right  - tamsulosin (FLOMAX) 0.4 MG capsule  Dispense: 90 capsule; Refill: 1  - ibuprofen (ADVIL/MOTRIN) 600 MG tablet  Dispense: 90 tablet; Refill: 0     Return if symptoms worsen or fail to improve.    The longitudinal plan of care for the diagnosis(es)/condition(s) as documented were addressed during this visit. Due to the added complexity in care, I will continue to support Chrystal in the subsequent management and with ongoing continuity of care.    Subjective   Chrystal is a 29 year old, presenting for the following health issues:  OTHER (KIDNEY PIAN (STONES)- would like to know if she can get meds for the pain or help // BACK PAIN AND LEG PAIN)        2/24/2025     3:30 PM   Additional Questions   Roomed by MELIDA   Accompanied by SELF         2/24/2025    Information    services provided? Yes   Language Mojgan   Type of interpretation provided Telephone    ID [phone]    Agency [facility]  Services     HPI   28 yo F with hx nephrolithiasis here today with concern for left flank pain starting about 2 months ago. The pain is waxing and waning but present constantly, and when especially severe she cannot find a comfortable position and sometimes vomits.  The pain radiates to her leg.    2 years ago she had similar pain and was found to have a kidney stone but was not prescribed any medication. Today her pain feels the same as that episode.        Objective    /70 (BP Location: Left arm, Patient Position: Sitting, Cuff Size: Adult Small)   Pulse 77   Temp 98.1  F (36.7  C) (Oral)   Resp 18   Ht 1.524 m (5')   Wt 63.9 kg (140 lb 12.8 oz)   LMP  The ECG shows the rhythm is unchanged. 02/10/2025 (Approximate)   SpO2 95%   Breastfeeding Yes   BMI 27.50 kg/m    Body mass index is 27.5 kg/m .    Physical Exam   Constitutional: alert, no acute distress, pleasant and cooperative  Respiratory: normal respiratory rate/rhythm/effort, speaks in complete sentences  Eyes: conjunctivae normal and sclera anicteric  : L CVA tenderness  Neuro: Alert and oriented, no focal deficits  Psychiatric: mentation and affect normal, judgment and insight intact, speech with normal elvie, prosody, and volume      Urinalysis pending        Signed Electronically by: Fer Doll MD

## 2025-03-03 ENCOUNTER — ANCILLARY PROCEDURE (OUTPATIENT)
Dept: ULTRASOUND IMAGING | Facility: CLINIC | Age: 30
End: 2025-03-03
Payer: COMMERCIAL

## 2025-03-03 DIAGNOSIS — R10.9 FLANK PAIN: ICD-10-CM

## 2025-03-03 PROCEDURE — 76775 US EXAM ABDO BACK WALL LIM: CPT | Mod: TC | Performed by: RADIOLOGY

## 2025-03-03 NOTE — NURSING NOTE
Due to patient being non-English speaking/uses sign language, an  was used for this visit. Only for face-to-face interpretation by an external agency, date and length of interpretation can be found on the scanned worksheet.     name: #[id number]  Agency:  [employer]  Language:  Philto    Telephone number: [phone]  Type of interpretation: Telephone, spoken

## 2025-03-04 DIAGNOSIS — R10.9 FLANK PAIN: Primary | ICD-10-CM

## 2025-03-04 NOTE — RESULT ENCOUNTER NOTE
Called to discuss.  All questions answered.  Ultrasound unrevealing and pain has not improved so ordered CT abdomen pelvis.

## 2025-03-19 ENCOUNTER — HOSPITAL ENCOUNTER (OUTPATIENT)
Dept: CT IMAGING | Facility: HOSPITAL | Age: 30
Discharge: HOME | End: 2025-03-19
Payer: COMMERCIAL

## 2025-03-19 DIAGNOSIS — R10.9 FLANK PAIN: ICD-10-CM

## 2025-03-19 PROCEDURE — 74176 CT ABD & PELVIS W/O CONTRAST: CPT

## 2025-03-27 ENCOUNTER — OFFICE VISIT (OUTPATIENT)
Dept: FAMILY MEDICINE | Facility: CLINIC | Age: 30
End: 2025-03-27
Payer: COMMERCIAL

## 2025-03-27 VITALS
OXYGEN SATURATION: 98 % | TEMPERATURE: 98 F | DIASTOLIC BLOOD PRESSURE: 70 MMHG | HEART RATE: 77 BPM | RESPIRATION RATE: 20 BRPM | SYSTOLIC BLOOD PRESSURE: 106 MMHG

## 2025-03-27 DIAGNOSIS — R10.9 FLANK PAIN: Primary | ICD-10-CM

## 2025-03-27 DIAGNOSIS — Z12.4 CERVICAL CANCER SCREENING: ICD-10-CM

## 2025-03-27 LAB
ALBUMIN UR-MCNC: ABNORMAL MG/DL
APPEARANCE UR: CLEAR
BACTERIA #/AREA URNS HPF: ABNORMAL /HPF
BILIRUB UR QL STRIP: NEGATIVE
COLOR UR AUTO: YELLOW
GLUCOSE UR STRIP-MCNC: NEGATIVE MG/DL
HGB UR QL STRIP: NEGATIVE
KETONES UR STRIP-MCNC: NEGATIVE MG/DL
LEUKOCYTE ESTERASE UR QL STRIP: ABNORMAL
NITRATE UR QL: NEGATIVE
PH UR STRIP: 6 [PH] (ref 5–8)
RBC #/AREA URNS AUTO: ABNORMAL /HPF
SP GR UR STRIP: >=1.03 (ref 1–1.03)
SQUAMOUS #/AREA URNS AUTO: ABNORMAL /LPF
UROBILINOGEN UR STRIP-ACNC: 0.2 E.U./DL
WBC #/AREA URNS AUTO: ABNORMAL /HPF

## 2025-03-28 NOTE — PROGRESS NOTES
Assessment & Plan     Flank pain  Waxing/waning, nonpositional, at times debilitating left flank pain with CVA tenderness for several months now.  Feels very similar to when she had a past ultrasound-confirmed kidney stone.  Now has negative ultrasound, UA, CT.  Patient reports having had multiple similar episodes for at least ~8 years, starting in [place].  At least once in [facility] she presented for such and was told she had an infection, and symptoms improved when she took prescribed medications.  Most recently, in 2022 a renal ultrasound showed right hydroureteronephrosis with a nonobstructing right 3 mm stone.  Past episodes have involved right and left kidneys.  Creatinine has never been elevated.  Blood seen on UA on 6/27/2024 but no accompanying imaging results at that time.  Denies fevers, dysuria, fatigue, malaise.  May be musculoskeletal, but given history my index of suspicion for renal causes is still fairly high.  Patient given back stretching exercises.  In the absence of any imaging evidence for potential surgical problem, will place nephrology E-consult for recommendations for additional evaluation.  - ibuprofen prn  - Urine Macroscopic with reflex to Microscopic  - Urine Microscopic Exam  - Urine Culture  - Nephrology E-consult    Cervical cancer screening  Past due, recommended to return to clinic for this as time today did not allow.    Return if symptoms worsen or fail to improve.    Anny Jarrell is a 29 year old, presenting for the following health issues:  RECHECK (ULTRASOUND RESULTS )      3/27/2025     4:42 PM   Additional Questions   Roomed by MELIDA   Accompanied by SELF AND          3/27/2025   Forms   Any forms needing to be completed Yes         3/27/2025    Information    services provided? Yes   Language --   Type of interpretation provided Telephone    ID [phone]    Agency [facility]  Services     HPI     Patient returns for evaluation of flank pain.  She first saw me for this 1 month ago and there has been no improvement.  UA ordered last time but sample was not provided.  Ultrasound and CT were negative.  The character of the pain has not changed since she was last seen.  She denies fevers or gross blood in urine.  She has not been taking the prescribed ibuprofen.      Objective    /70 (BP Location: Left arm, Patient Position: Sitting, Cuff Size: Adult Regular)   Pulse 77   Temp 98  F (36.7  C) (Oral)   Resp 20   LMP 02/10/2025 (Approximate)   SpO2 98%   There is no height or weight on file to calculate BMI.    Physical Exam   Constitutional: alert, no acute distress, pleasant and cooperative  Cardiovascular: regular rate and rhythm, no murmurs/rubs/gallops  Respiratory: normal respiratory rate/rhythm/effort, speaks in complete sentences  Eyes: conjunctivae normal and sclera anicteric  Abdomen: Abdomen soft and without distension or tenderness.  : L CVA tenderness  Neuro: no focal deficits  MSK: no gross deformities noted, range of motion grossly normal   Psychiatric: mentation and affect normal, judgment and insight intact, speech with normal elvie, prosody, and volume      Signed Electronically by: Fer Doll MD

## 2025-03-29 LAB — BACTERIA UR CULT: NORMAL

## 2025-04-04 NOTE — PROGRESS NOTES
Preceptor attestation:  Vital signs reviewed: /70 (BP Location: Left arm, Patient Position: Sitting, Cuff Size: Adult Regular)   Pulse 77   Temp 98  F (36.7  C) (Oral)   Resp 20   LMP 02/10/2025 (Approximate)   SpO2 98%     Patient seen, evaluated, and discussed with the resident.  I verified the content of the note, which accurately reflects my assessment of the patient and the plan of care.    Supervising physician: Nsareen Poon MD  [facility]
